# Patient Record
Sex: FEMALE | Race: WHITE | HISPANIC OR LATINO | Employment: PART TIME | URBAN - METROPOLITAN AREA
[De-identification: names, ages, dates, MRNs, and addresses within clinical notes are randomized per-mention and may not be internally consistent; named-entity substitution may affect disease eponyms.]

---
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
## 2022-01-27 PROBLEM — N91.1 SECONDARY AMENORRHEA: Status: ACTIVE | Noted: 2022-01-27

## 2022-03-18 ENCOUNTER — OFFICE VISIT (OUTPATIENT)
Dept: OBGYN CLINIC | Facility: CLINIC | Age: 22
End: 2022-03-18
Payer: COMMERCIAL

## 2022-03-18 VITALS
SYSTOLIC BLOOD PRESSURE: 122 MMHG | HEIGHT: 61 IN | BODY MASS INDEX: 25.11 KG/M2 | DIASTOLIC BLOOD PRESSURE: 82 MMHG | WEIGHT: 133 LBS

## 2022-03-18 DIAGNOSIS — B96.89 BV (BACTERIAL VAGINOSIS): Primary | ICD-10-CM

## 2022-03-18 DIAGNOSIS — Z11.3 SCREENING FOR STD (SEXUALLY TRANSMITTED DISEASE): ICD-10-CM

## 2022-03-18 DIAGNOSIS — R10.2 VAGINAL PAIN: ICD-10-CM

## 2022-03-18 DIAGNOSIS — N76.0 BV (BACTERIAL VAGINOSIS): Primary | ICD-10-CM

## 2022-03-18 LAB
BV WHIFF TEST VAG QL: ABNORMAL
CLUE CELLS SPEC QL WET PREP: ABNORMAL
T VAGINALIS VAG QL WET PREP: ABNORMAL
YEAST VAG QL WET PREP: ABNORMAL

## 2022-03-18 PROCEDURE — 87491 CHLMYD TRACH DNA AMP PROBE: CPT | Performed by: OBSTETRICS & GYNECOLOGY

## 2022-03-18 PROCEDURE — 1036F TOBACCO NON-USER: CPT | Performed by: OBSTETRICS & GYNECOLOGY

## 2022-03-18 PROCEDURE — 87210 SMEAR WET MOUNT SALINE/INK: CPT | Performed by: OBSTETRICS & GYNECOLOGY

## 2022-03-18 PROCEDURE — 3008F BODY MASS INDEX DOCD: CPT | Performed by: OBSTETRICS & GYNECOLOGY

## 2022-03-18 PROCEDURE — 99203 OFFICE O/P NEW LOW 30 MIN: CPT | Performed by: OBSTETRICS & GYNECOLOGY

## 2022-03-18 PROCEDURE — 87591 N.GONORRHOEAE DNA AMP PROB: CPT | Performed by: OBSTETRICS & GYNECOLOGY

## 2022-03-18 RX ORDER — DIPHENOXYLATE HYDROCHLORIDE AND ATROPINE SULFATE 2.5; .025 MG/1; MG/1
1 TABLET ORAL DAILY
COMMUNITY

## 2022-03-18 RX ORDER — METRONIDAZOLE 500 MG/1
500 TABLET ORAL EVERY 12 HOURS SCHEDULED
Qty: 14 TABLET | Refills: 0 | Status: SHIPPED | OUTPATIENT
Start: 2022-03-18 | End: 2022-03-25

## 2022-03-19 LAB
C TRACH DNA SPEC QL NAA+PROBE: NEGATIVE
N GONORRHOEA DNA SPEC QL NAA+PROBE: NEGATIVE

## 2022-03-21 ENCOUNTER — TELEPHONE (OUTPATIENT)
Dept: OBGYN CLINIC | Facility: CLINIC | Age: 22
End: 2022-03-21

## 2022-03-21 NOTE — PROGRESS NOTES
Gynecology   Angel Hunt 25 y o  female MRN: [id number]    Assessment/Plan     1  Vaginal pain    - US pelvis complete w transvaginal; Future    2  Screening for STD (sexually transmitted disease)    - Chlamydia/GC amplified DNA by PCR    3  BV (bacterial vaginosis)    - metroNIDAZOLE (FLAGYL) 500 mg tablet; Take 1 tablet (500 mg total) by mouth every 12 (twelve) hours for 7 days  Dispense: 14 tablet; Refill: 0      HPI:  Angel Hunt is a 25 y o  female who presents with yellow vaginal discharge and vaginal pain x 1 week  No spotting  No fever, n/v  No urinary symptoms  Has not tried OTC meds        Historical Information   Past Medical History:   Diagnosis Date    Pilonidal cyst     Wears glasses      Past Surgical History:   Procedure Laterality Date    INCISION AND DRAINAGE ABSCESS ANAL      Pilonidal Cyst  4 times-cut & drained in office-reoccurred    CT REMV PILONIDAL LESION COMPLIC N/A 2/17/4963    Procedure: EXCISION PILONIDAL CYST;  Surgeon: Caro Schuster MD;  [facility];  Service: General    CT REMV PILONIDAL LESION SIMPLE N/A 4/5/2018    Procedure: EXCISION PILONIDAL CYST and debriding;  Surgeon: Caro Schuster MD;  [facility];  Service: General     OB/GYN History:   Nulligravida  Sexually active  Contraception:  Condoms  Regular menses    Family History   Problem Relation Age of Onset    No Known Problems Mother     No Known Problems Father     Diabetes Maternal Grandmother     Heart disease Maternal Grandmother     Hypertension Maternal Grandmother     Cancer Paternal Grandmother     Cancer Other     Cancer Other      Social History   Social History     Substance and Sexual Activity   Alcohol Use No     Social History     Substance and Sexual Activity   Drug Use No     Social History     Tobacco Use   Smoking Status Never Smoker   Smokeless Tobacco Never Used     E-Cigarette/Vaping    E-Cigarette Use Never User      E-Cigarette/Vaping Substances    Nicotine No     THC No     CBD No     Flavoring No     Other No     Unknown No        Meds/Allergies   Current Outpatient Medications on File Prior to Visit   Medication Sig    multivitamin (THERAGRAN) TABS Take 1 tablet by mouth daily    ferrous sulfate 325 (65 Fe) mg tablet Take 325 mg by mouth daily with breakfast (Patient not taking: Reported on 3/18/2022 )     No current facility-administered medications on file prior to visit  Allergies   Allergen Reactions    Sulfa Antibiotics Hives       Objective   Vitals: Blood pressure 122/82, height 5' 0 75" (1 543 m), weight 60 3 kg (133 lb), last menstrual period 03/07/2022, not currently breastfeeding  Physical Exam  Constitutional:       Appearance: Normal appearance  Genitourinary:      Right Labia: No lesions or Bartholin's cyst      Left Labia: No lesions or Bartholin's cyst      No inguinal adenopathy present in the right or left side  Vaginal discharge present  No vaginal bleeding  Right Adnexa: not tender, not full and no mass present  Left Adnexa: not tender, not full and no mass present  Cervical motion tenderness present  No cervical discharge or friability  Uterus is not enlarged, fixed or tender  HENT:      Head: Normocephalic  Cardiovascular:      Rate and Rhythm: Normal rate and regular rhythm  Pulmonary:      Effort: Pulmonary effort is normal    Abdominal:      General: There is no distension  Palpations: Abdomen is soft  Tenderness: There is no abdominal tenderness  Musculoskeletal:         General: No swelling  Lymphadenopathy:      Lower Body: No right inguinal adenopathy  No left inguinal adenopathy  Neurological:      General: No focal deficit present  Mental Status: She is alert and oriented to person, place, and time  Skin:     General: Skin is warm and dry  Psychiatric:         Mood and Affect: Mood normal          Behavior: Behavior normal    Vitals reviewed  Microscopic wet-mount exam shows clue cells, excessive bacteria

## 2022-04-07 ENCOUNTER — OFFICE VISIT (OUTPATIENT)
Dept: FAMILY MEDICINE CLINIC | Facility: CLINIC | Age: 22
End: 2022-04-07

## 2022-04-07 VITALS
HEIGHT: 61 IN | WEIGHT: 138.8 LBS | DIASTOLIC BLOOD PRESSURE: 85 MMHG | RESPIRATION RATE: 16 BRPM | SYSTOLIC BLOOD PRESSURE: 124 MMHG | HEART RATE: 96 BPM | TEMPERATURE: 98 F | BODY MASS INDEX: 26.21 KG/M2

## 2022-04-07 DIAGNOSIS — R52 BODY ACHES: Primary | ICD-10-CM

## 2022-04-07 PROCEDURE — 99213 OFFICE O/P EST LOW 20 MIN: CPT | Performed by: FAMILY MEDICINE

## 2022-04-07 PROCEDURE — 1036F TOBACCO NON-USER: CPT | Performed by: FAMILY MEDICINE

## 2022-04-07 PROCEDURE — 3008F BODY MASS INDEX DOCD: CPT | Performed by: FAMILY MEDICINE

## 2022-04-07 PROCEDURE — 3008F BODY MASS INDEX DOCD: CPT | Performed by: OBSTETRICS & GYNECOLOGY

## 2022-04-07 NOTE — ASSESSMENT & PLAN NOTE
Chronic, 1 year duration  - Muscle aches along with joint pain in knees, back, neck, fingers, and shoulders  - No first degree relatives with autoimmune conditions, possible cousin with SLE  - No swelling, muscle pain or other systemic manifestations at this time  - Autoimmune conditions on the differential;however, patient does have a sedentary lifestyle at work and this could contribute to decreased muscle mass  - Will trial weight-lifting exercise along with current cardio routine for 1 month; if no improvement, will proceed with autoimmune workup

## 2022-05-10 ENCOUNTER — OFFICE VISIT (OUTPATIENT)
Dept: FAMILY MEDICINE CLINIC | Facility: CLINIC | Age: 22
End: 2022-05-10

## 2022-05-10 VITALS
SYSTOLIC BLOOD PRESSURE: 130 MMHG | RESPIRATION RATE: 18 BRPM | WEIGHT: 134 LBS | HEIGHT: 61 IN | DIASTOLIC BLOOD PRESSURE: 100 MMHG | BODY MASS INDEX: 25.3 KG/M2 | HEART RATE: 126 BPM | TEMPERATURE: 97.6 F | OXYGEN SATURATION: 98 %

## 2022-05-10 DIAGNOSIS — R52 BODY ACHES: Primary | ICD-10-CM

## 2022-05-10 DIAGNOSIS — R53.83 FATIGUE, UNSPECIFIED TYPE: ICD-10-CM

## 2022-05-10 PROCEDURE — 1036F TOBACCO NON-USER: CPT | Performed by: FAMILY MEDICINE

## 2022-05-10 PROCEDURE — 99213 OFFICE O/P EST LOW 20 MIN: CPT | Performed by: FAMILY MEDICINE

## 2022-05-10 PROCEDURE — 3008F BODY MASS INDEX DOCD: CPT | Performed by: FAMILY MEDICINE

## 2022-05-10 NOTE — ASSESSMENT & PLAN NOTE
Continues to have body aches and joint pain; mostly in the shoulders and hips with some low back involvement  - Patient also endorses increased fatigue and difficulty sleeping  - Will proceed with autoimmune workup along with obtaining TSH and CMP  - Will follow-up with discussion over labs

## 2022-05-10 NOTE — PROGRESS NOTES
Assessment/Plan: Body aches  Continues to have body aches and joint pain; mostly in the shoulders and hips with some low back involvement  - Patient also endorses increased fatigue and difficulty sleeping  - Will proceed with autoimmune workup along with obtaining TSH and CMP  - Will follow-up with discussion over labs       Diagnoses and all orders for this visit:    Body aches  -     Sedimentation rate, automated; Future  -     VIDAL w/Reflex if Positive; Future  -     CBC and differential; Future  -     TSH, 3rd generation; Future  -     Comprehensive metabolic panel; Future    Fatigue, unspecified type  -     CBC and differential; Future  -     TSH, 3rd generation; Future  -     Comprehensive metabolic panel; Future          Subjective:      Patient ID: Maira Graham is a 23 y o  female  28yo female her for a follow-up of her body aches  She is having shoulder and hip pain and having difficulty sleeping at night because of the pain  She is taking Tylenol without much relief  She has cut her cardio to 20 minutes and has been doing 10-15 minutes of free weights at the gym  She is also having neck pain and back pain  The following portions of the patient's history were reviewed and updated as appropriate: allergies, current medications, past family history, past medical history, past social history, past surgical history and problem list     Review of Systems   Constitutional: Positive for fatigue  HENT: Negative  Respiratory: Negative  Cardiovascular: Negative  Gastrointestinal: Positive for diarrhea (when eating dairy products)  Genitourinary: Negative  Musculoskeletal: Positive for back pain  Negative for arthralgias  Neurological: Positive for weakness  Negative for dizziness, syncope, light-headedness, numbness and headaches           Objective:      /100 (BP Location: Left arm, Patient Position: Sitting, Cuff Size: Standard)   Pulse (!) 126   Temp 97 6 °F (36 4 °C) (Temporal)   Resp 18   Ht 5' 1" (1 549 m)   Wt 60 8 kg (134 lb)   SpO2 98%   BMI 25 32 kg/m²          Physical Exam  Vitals reviewed  Constitutional:       Appearance: Normal appearance  She is normal weight  HENT:      Head: Normocephalic and atraumatic  Cardiovascular:      Rate and Rhythm: Normal rate and regular rhythm  Heart sounds: Normal heart sounds  Pulmonary:      Effort: Pulmonary effort is normal    Musculoskeletal:         General: No swelling or tenderness  Normal range of motion  Skin:     General: Skin is warm and dry  Coloration: Skin is not pale  Findings: No rash  Neurological:      Mental Status: She is alert  Mental status is at baseline     Psychiatric:         Mood and Affect: Mood normal          Behavior: Behavior normal

## 2022-05-20 ENCOUNTER — LAB (OUTPATIENT)
Dept: LAB | Facility: CLINIC | Age: 22
End: 2022-05-20
Payer: COMMERCIAL

## 2022-05-20 DIAGNOSIS — R52 BODY ACHES: ICD-10-CM

## 2022-05-20 DIAGNOSIS — R53.83 FATIGUE, UNSPECIFIED TYPE: ICD-10-CM

## 2022-05-20 LAB
ALBUMIN SERPL BCP-MCNC: 3.6 G/DL (ref 3.5–5)
ALP SERPL-CCNC: 61 U/L (ref 46–116)
ALT SERPL W P-5'-P-CCNC: 24 U/L (ref 12–78)
ANION GAP SERPL CALCULATED.3IONS-SCNC: 7 MMOL/L (ref 4–13)
AST SERPL W P-5'-P-CCNC: 12 U/L (ref 5–45)
BASOPHILS # BLD AUTO: 0.02 THOUSANDS/ΜL (ref 0–0.1)
BASOPHILS NFR BLD AUTO: 0 % (ref 0–1)
BILIRUB SERPL-MCNC: 0.43 MG/DL (ref 0.2–1)
BUN SERPL-MCNC: 13 MG/DL (ref 5–25)
CALCIUM SERPL-MCNC: 9.5 MG/DL (ref 8.3–10.1)
CHLORIDE SERPL-SCNC: 104 MMOL/L (ref 100–108)
CO2 SERPL-SCNC: 25 MMOL/L (ref 21–32)
CREAT SERPL-MCNC: 0.72 MG/DL (ref 0.6–1.3)
EOSINOPHIL # BLD AUTO: 0.05 THOUSAND/ΜL (ref 0–0.61)
EOSINOPHIL NFR BLD AUTO: 1 % (ref 0–6)
ERYTHROCYTE [DISTWIDTH] IN BLOOD BY AUTOMATED COUNT: 13.8 % (ref 11.6–15.1)
ERYTHROCYTE [SEDIMENTATION RATE] IN BLOOD: 49 MM/HOUR (ref 0–19)
GFR SERPL CREATININE-BSD FRML MDRD: 120 ML/MIN/1.73SQ M
GLUCOSE P FAST SERPL-MCNC: 87 MG/DL (ref 65–99)
HCT VFR BLD AUTO: 35.8 % (ref 34.8–46.1)
HGB BLD-MCNC: 11.2 G/DL (ref 11.5–15.4)
IMM GRANULOCYTES # BLD AUTO: 0.01 THOUSAND/UL (ref 0–0.2)
IMM GRANULOCYTES NFR BLD AUTO: 0 % (ref 0–2)
LYMPHOCYTES # BLD AUTO: 1.76 THOUSANDS/ΜL (ref 0.6–4.47)
LYMPHOCYTES NFR BLD AUTO: 33 % (ref 14–44)
MCH RBC QN AUTO: 27 PG (ref 26.8–34.3)
MCHC RBC AUTO-ENTMCNC: 31.3 G/DL (ref 31.4–37.4)
MCV RBC AUTO: 86 FL (ref 82–98)
MONOCYTES # BLD AUTO: 0.59 THOUSAND/ΜL (ref 0.17–1.22)
MONOCYTES NFR BLD AUTO: 11 % (ref 4–12)
NEUTROPHILS # BLD AUTO: 2.95 THOUSANDS/ΜL (ref 1.85–7.62)
NEUTS SEG NFR BLD AUTO: 55 % (ref 43–75)
NRBC BLD AUTO-RTO: 0 /100 WBCS
PLATELET # BLD AUTO: 226 THOUSANDS/UL (ref 149–390)
PMV BLD AUTO: 13.5 FL (ref 8.9–12.7)
POTASSIUM SERPL-SCNC: 4.1 MMOL/L (ref 3.5–5.3)
PROT SERPL-MCNC: 8.1 G/DL (ref 6.4–8.2)
RBC # BLD AUTO: 4.15 MILLION/UL (ref 3.81–5.12)
SODIUM SERPL-SCNC: 136 MMOL/L (ref 136–145)
TSH SERPL DL<=0.05 MIU/L-ACNC: <0.007 UIU/ML (ref 0.45–4.5)
WBC # BLD AUTO: 5.38 THOUSAND/UL (ref 4.31–10.16)

## 2022-05-20 PROCEDURE — 80053 COMPREHEN METABOLIC PANEL: CPT

## 2022-05-20 PROCEDURE — 86038 ANTINUCLEAR ANTIBODIES: CPT

## 2022-05-20 PROCEDURE — 85025 COMPLETE CBC W/AUTO DIFF WBC: CPT

## 2022-05-20 PROCEDURE — 36415 COLL VENOUS BLD VENIPUNCTURE: CPT

## 2022-05-20 PROCEDURE — 85652 RBC SED RATE AUTOMATED: CPT

## 2022-05-20 PROCEDURE — 84443 ASSAY THYROID STIM HORMONE: CPT

## 2022-05-21 LAB
ANA HOMOGEN TITR SER: ABNORMAL {TITER}
ANA TITR SER IF: POSITIVE {TITER}
SL AMB NOTE:: ABNORMAL

## 2022-05-24 ENCOUNTER — TELEPHONE (OUTPATIENT)
Dept: FAMILY MEDICINE CLINIC | Facility: CLINIC | Age: 22
End: 2022-05-24

## 2022-05-24 DIAGNOSIS — R76.8 POSITIVE ANA (ANTINUCLEAR ANTIBODY): Primary | ICD-10-CM

## 2022-05-24 DIAGNOSIS — R52 BODY ACHES: Primary | ICD-10-CM

## 2022-05-24 DIAGNOSIS — R53.83 FATIGUE, UNSPECIFIED TYPE: ICD-10-CM

## 2022-05-24 NOTE — TELEPHONE ENCOUNTER
Called the patient discussed her recent blood work test results with her  We discussed what upon positive VIDAL and titer means and that she needs further testing to determine which condition she may have  I placed order for a UA, Anti-SSA, and repeat TSH with reflex  The patient says she will go get her blood test done as soon as she is able to  I will call her with the results and we will determine if further evaluation is needed by Rheumatology      Ya Lomax MD  05/24/22  10:58 AM

## 2022-05-27 ENCOUNTER — APPOINTMENT (OUTPATIENT)
Dept: LAB | Facility: CLINIC | Age: 22
End: 2022-05-27
Payer: COMMERCIAL

## 2022-05-27 DIAGNOSIS — R79.89 ELEVATED SERUM FREE T4 LEVEL: ICD-10-CM

## 2022-05-27 DIAGNOSIS — R53.83 FATIGUE, UNSPECIFIED TYPE: ICD-10-CM

## 2022-05-27 DIAGNOSIS — R76.8 POSITIVE ANA (ANTINUCLEAR ANTIBODY): ICD-10-CM

## 2022-05-27 DIAGNOSIS — R52 BODY ACHES: ICD-10-CM

## 2022-05-27 LAB
BACTERIA UR QL AUTO: ABNORMAL /HPF
BILIRUB UR QL STRIP: NEGATIVE
CLARITY UR: CLEAR
COLOR UR: ABNORMAL
GLUCOSE UR STRIP-MCNC: NEGATIVE MG/DL
HGB UR QL STRIP.AUTO: ABNORMAL
KETONES UR STRIP-MCNC: NEGATIVE MG/DL
LEUKOCYTE ESTERASE UR QL STRIP: NEGATIVE
NITRITE UR QL STRIP: NEGATIVE
NON-SQ EPI CELLS URNS QL MICRO: ABNORMAL /HPF
PH UR STRIP.AUTO: 7.5 [PH]
PROT UR STRIP-MCNC: NEGATIVE MG/DL
RBC #/AREA URNS AUTO: ABNORMAL /HPF
SP GR UR STRIP.AUTO: 1.01 (ref 1–1.03)
T4 FREE SERPL-MCNC: 2.25 NG/DL (ref 0.78–1.33)
TSH SERPL DL<=0.05 MIU/L-ACNC: <0.007 UIU/ML (ref 0.45–4.5)
UROBILINOGEN UR STRIP-ACNC: <2 MG/DL
WBC #/AREA URNS AUTO: ABNORMAL /HPF

## 2022-05-27 PROCEDURE — 86235 NUCLEAR ANTIGEN ANTIBODY: CPT

## 2022-05-27 PROCEDURE — 81001 URINALYSIS AUTO W/SCOPE: CPT | Performed by: FAMILY MEDICINE

## 2022-05-27 PROCEDURE — 84443 ASSAY THYROID STIM HORMONE: CPT

## 2022-05-27 PROCEDURE — 86376 MICROSOMAL ANTIBODY EACH: CPT

## 2022-05-27 PROCEDURE — 36415 COLL VENOUS BLD VENIPUNCTURE: CPT

## 2022-05-27 PROCEDURE — 84439 ASSAY OF FREE THYROXINE: CPT

## 2022-05-28 LAB
ENA SS-A AB SER-ACNC: <0.2 AI (ref 0–0.9)
ENA SS-B AB SER-ACNC: <0.2 AI (ref 0–0.9)

## 2022-05-30 DIAGNOSIS — R79.89 ELEVATED SERUM FREE T4 LEVEL: Primary | ICD-10-CM

## 2022-05-30 DIAGNOSIS — R76.8 POSITIVE ANA (ANTINUCLEAR ANTIBODY): ICD-10-CM

## 2022-05-30 DIAGNOSIS — R52 BODY ACHES: ICD-10-CM

## 2022-05-31 LAB — THYROPEROXIDASE AB SERPL-ACNC: 173 IU/ML (ref 0–34)

## 2022-05-31 NOTE — TELEPHONE ENCOUNTER
Patient got lab work on 5/27 and got results on 5/30 wants to have Dr Eddy Vegas call and discuss results  Patient can be reached at [phone]

## 2022-06-01 LAB — THYROGLOB AB SERPL-ACNC: 1.2 IU/ML (ref 0–0.9)

## 2022-06-07 DIAGNOSIS — R94.6 ABNORMAL THYROID FUNCTION TEST: Primary | ICD-10-CM

## 2022-06-07 NOTE — PROGRESS NOTES
Called the patient regarding her thyroid results  We discussed the potential etiologies of her thyroid dysfunction including Grave's disease vs nodular adenoma  I discussed with her that I am ordering a RAIU scan that I would like for her schedule to differentiate between the two  She understands the need for this additional test and all of her questions were answered  Plan to followup results of testing to determine treatment      Shannan Figueroa MD   06/07/22  8:28 AM

## 2022-06-29 ENCOUNTER — HOSPITAL ENCOUNTER (OUTPATIENT)
Dept: NUCLEAR MEDICINE | Facility: HOSPITAL | Age: 22
Discharge: HOME | End: 2022-06-29
Payer: COMMERCIAL

## 2022-06-29 DIAGNOSIS — R94.6 ABNORMAL THYROID FUNCTION TEST: ICD-10-CM

## 2022-06-29 PROCEDURE — 78014 THYROID IMAGING W/BLOOD FLOW: CPT

## 2022-06-29 PROCEDURE — G1004 CDSM NDSC: HCPCS

## 2022-06-29 PROCEDURE — A9516 IODINE I-123 SOD IODIDE MIC: HCPCS

## 2022-06-30 ENCOUNTER — HOSPITAL ENCOUNTER (OUTPATIENT)
Dept: NUCLEAR MEDICINE | Facility: HOSPITAL | Age: 22
Discharge: HOME | End: 2022-06-30
Payer: COMMERCIAL

## 2022-07-07 ENCOUNTER — TELEPHONE (OUTPATIENT)
Dept: INTERNAL MEDICINE CLINIC | Facility: CLINIC | Age: 22
End: 2022-07-07

## 2022-07-07 ENCOUNTER — OFFICE VISIT (OUTPATIENT)
Dept: FAMILY MEDICINE CLINIC | Facility: CLINIC | Age: 22
End: 2022-07-07

## 2022-07-07 VITALS
BODY MASS INDEX: 23.92 KG/M2 | TEMPERATURE: 98.3 F | DIASTOLIC BLOOD PRESSURE: 73 MMHG | SYSTOLIC BLOOD PRESSURE: 120 MMHG | OXYGEN SATURATION: 92 % | WEIGHT: 126.6 LBS | HEART RATE: 117 BPM

## 2022-07-07 DIAGNOSIS — E05.90 HYPERTHYROIDISM: Primary | ICD-10-CM

## 2022-07-07 PROCEDURE — 93000 ELECTROCARDIOGRAM COMPLETE: CPT | Performed by: FAMILY MEDICINE

## 2022-07-07 PROCEDURE — 1036F TOBACCO NON-USER: CPT | Performed by: FAMILY MEDICINE

## 2022-07-07 PROCEDURE — 3725F SCREEN DEPRESSION PERFORMED: CPT | Performed by: FAMILY MEDICINE

## 2022-07-07 PROCEDURE — 99213 OFFICE O/P EST LOW 20 MIN: CPT | Performed by: FAMILY MEDICINE

## 2022-07-07 RX ORDER — METHIMAZOLE 5 MG/1
5 TABLET ORAL 3 TIMES DAILY
Qty: 270 TABLET | Refills: 1 | Status: SHIPPED | OUTPATIENT
Start: 2022-07-07 | End: 2022-07-11 | Stop reason: SDUPTHER

## 2022-07-07 NOTE — PROGRESS NOTES
Assessment/Plan:    Hyperthyroidism  - Suppressed TSh with elevated t4 and positive thyroid antibody  Thyroid uptake showed diffuse pattern which is most likely consistent with Graves Disease  -EKG in office normal sinus rhythm   -Will start patient on methimazole, refer to endocrinology  -f/u in office in 6 weeks          Diagnoses and all orders for this visit:    Hyperthyroidism  -     methimazole (TAPAZOLE) 5 mg tablet; Take 1 tablet (5 mg total) by mouth 3 (three) times a day  -     TSH, 3rd generation; Future  -     T3, free; Future  -     T4, free; Future  -     CBC and Platelet; Future  -     Ambulatory Referral to Endocrinology; Future        Subjective:      Patient ID: Hector Becerra is a 23 y o  female  The following portions of the patient's history were reviewed and updated as appropriate: allergies, current medications, past family history, past medical history and past surgical history  HPI     Ms Amada Swanson presents today in clinic to review the results of her RAIU Scan  She states that her fatigue, arthralgias, and myalgias have remained unchanged since her last visit  She has noticed that her hair in skin are more dry and she endorses increased hair loss  Patient also endorses increased agitation and anxiety, SOB with exertion, chest pain, and palpitations  She staets that her maternal grandmother and sister have been diagnosed with T1DM  Patient also states that her mother has a diagnoses of arthritis but is unsure if it is RA  Review of Systems   Constitutional: Positive for fatigue  Negative for chills and fever  HENT: Negative for rhinorrhea and sore throat  Eyes: Negative for pain and visual disturbance  Respiratory: Positive for shortness of breath  Negative for cough and wheezing  Cardiovascular: Positive for chest pain and palpitations  Gastrointestinal: Negative for abdominal pain, constipation and diarrhea     Endocrine: Negative for cold intolerance, heat intolerance and polydipsia  Genitourinary: Negative for difficulty urinating, dyspareunia, frequency and menstrual problem  Musculoskeletal: Positive for arthralgias, myalgias and neck pain  Skin: Negative for color change and rash  Neurological: Negative for dizziness, light-headedness and headaches  Hematological: Does not bruise/bleed easily  Psychiatric/Behavioral: Positive for agitation  The patient is nervous/anxious  Objective:      /73 (BP Location: Left arm, Patient Position: Sitting, Cuff Size: Standard)   Pulse (!) 117   Temp 98 3 °F (36 8 °C) (Temporal)   Wt 57 4 kg (126 lb 9 6 oz)   SpO2 92%   BMI 23 92 kg/m²          Physical Exam  Constitutional:       General: She is not in acute distress  Appearance: Normal appearance  She is normal weight  HENT:      Head: Normocephalic and atraumatic  Eyes:      Pupils: Pupils are equal, round, and reactive to light  Comments: Negative Lid Lag    Neck:      Comments: No goiter or nodule palpated   Cardiovascular:      Rate and Rhythm: Regular rhythm  Tachycardia present  Heart sounds: No murmur heard  Pulmonary:      Effort: Pulmonary effort is normal  No respiratory distress  Breath sounds: No stridor  No wheezing  Abdominal:      General: Abdomen is flat  Bowel sounds are normal       Palpations: Abdomen is soft  Musculoskeletal:         General: Normal range of motion  Cervical back: Normal range of motion  Skin:     General: Skin is dry  Findings: No bruising or rash  Neurological:      General: No focal deficit present  Mental Status: She is alert  Mental status is at baseline     Psychiatric:         Mood and Affect: Mood normal          Remy Triplett MD PGY-1

## 2022-07-07 NOTE — TELEPHONE ENCOUNTER
POCT EKG preformed at today's visit 7/7/2022  EKG printed and placed in [facility] Cler[employer] folder   Please scan

## 2022-07-07 NOTE — ASSESSMENT & PLAN NOTE
- Suppressed TSh with elevated t4 and positive thyroid antibody  Thyroid uptake showed diffuse pattern which is most likely consistent with Graves Disease  -EKG in office normal sinus rhythm   -Will start patient on methimazole, refer to endocrinology    -f/u in office in 6 weeks

## 2022-07-11 ENCOUNTER — CONSULT (OUTPATIENT)
Dept: ENDOCRINOLOGY | Facility: CLINIC | Age: 22
End: 2022-07-11
Payer: COMMERCIAL

## 2022-07-11 VITALS
BODY MASS INDEX: 24.02 KG/M2 | SYSTOLIC BLOOD PRESSURE: 124 MMHG | HEIGHT: 61 IN | DIASTOLIC BLOOD PRESSURE: 80 MMHG | WEIGHT: 127.2 LBS | HEART RATE: 100 BPM

## 2022-07-11 DIAGNOSIS — E05.90 HYPERTHYROIDISM: Primary | ICD-10-CM

## 2022-07-11 PROCEDURE — 99204 OFFICE O/P NEW MOD 45 MIN: CPT | Performed by: INTERNAL MEDICINE

## 2022-07-11 PROCEDURE — 3008F BODY MASS INDEX DOCD: CPT | Performed by: FAMILY MEDICINE

## 2022-07-11 RX ORDER — METHIMAZOLE 5 MG/1
TABLET ORAL
Qty: 360 TABLET | Refills: 1 | Status: SHIPPED | OUTPATIENT
Start: 2022-07-11 | End: 2022-08-13 | Stop reason: SDUPTHER

## 2022-07-11 NOTE — PROGRESS NOTES
7/11/2022    Assessment/Plan      Diagnoses and all orders for this visit:    Hyperthyroidism  -     Comprehensive metabolic panel; Future  -     CBC and differential; Future  -     TSH, 3rd generation; Future  -     T3, free; Future  -     T4, free; Future  -     Thyroid stimulating immunoglobulin; Future  -     methimazole (TAPAZOLE) 5 mg tablet; 4 tabs daily  DO NOT FILL YET, DOSE CHANGE  Assessment/Plan:  Hyperthyroidism:  Due to Graves disease based on positive autoimmune markers as well as uptake and scan results  I would suggest adjusting methimazole dose to 20 mg daily or 4 tablets daily  Discussed we could add a beta-blocker to her regimen for symptom control but patient would prefer to wait on repeat labs over the next few weeks and see how she feels at that time  Suggest repeating labs in about 3-4 weeks and we will call with the results  We reviewed mechanism of action of methimazole as well as side effects  Advised I would hold off on exercise especially aerobic exercise at this time until thyroid levels are normalized  Discussed with the patient that I would avoid pregnancy until thyroid levels are normal   I did mention that she should let us know in the future if pregnancy occurs so we can adjust her regimen appropriately  Also, discussed that once thyroid levels are normal if she tries for pregnancy, we may elect to switch to a different medication and during pregnancy we will monitor thyroid lab work regularly  Discussed with the patient about medical management of hyperthyroidism and use of antithyroid medications including the side effects of methimazole including joint aches, rash, agranulocytosis, and liver dysfunction  The patient should call if they develop side effects such as signs or symptoms of liver dysfunction (such as jaundice) or agranulocytosis (such as fever and sore throat)    Discussed the importance of compliance with medications to prevent life threatening complications of uncontrolled hyperthyroidism  CC: Hyperthyroidism consult    History of Present Illness     HPI: Andrew Wheatley is a 23y o  year old female who presents for consultation of hyperthyroidism  This was discovered during workup for symptoms of concern such as fatigue, chest pain, palpitations, shortness of breath, heat intolerance, difficulty sleeping  For her chest pain she did have evaluation by Cardiology and the patient states it is not find any cardiac cause  She was seen by her primary care doctor last week and started on methimazole 5 mg 3 times daily  She denies known thyroid history in the family but does report perhaps there is autoimmune disorders somewhere in her family such as with her aunt  She denies any neck compressive symptoms  She denies any eye discomfort, vision changes but does report her prescription for glasses to change recently  She denies smoking  She does exercise several times throughout the week including aerobic exercise  She does report interest in fertility in the future  Review of Systems   Constitutional: Positive for fatigue  HENT: Negative for trouble swallowing and voice change  Eyes: Negative for visual disturbance  Respiratory: Positive for shortness of breath  Cardiovascular: Positive for chest pain and palpitations  Negative for leg swelling  Gastrointestinal: Positive for constipation  Negative for abdominal pain, nausea and vomiting  Endocrine: Positive for heat intolerance  Negative for polydipsia and polyuria  Musculoskeletal: Positive for arthralgias and myalgias  Skin: Negative for rash  Neurological: Positive for tremors (shakiness)  Negative for dizziness and weakness  Hematological: Negative for adenopathy  Psychiatric/Behavioral: Negative for agitation and confusion  The patient is nervous/anxious          Historical Information   Past Medical History:   Diagnosis Date    Pilonidal cyst     Wears glasses Past Surgical History:   Procedure Laterality Date    INCISION AND DRAINAGE ABSCESS ANAL      Pilonidal Cyst  4 times-cut & drained in office-reoccurred    NE REMV PILONIDAL LESION COMPLIC N/A 9/50/9629    Procedure: EXCISION PILONIDAL CYST;  Surgeon: Marquise Calabrese MD;  Location: [facility];  Service: General    NE REMV PILONIDAL LESION SIMPLE N/A 4/5/2018    Procedure: EXCISION PILONIDAL CYST and debriding;  Surgeon: Marquise Calabrese MD;  Location[facility];  Service: General     Social History   Social History     Substance and Sexual Activity   Alcohol Use No     Social History     Substance and Sexual Activity   Drug Use No     Social History     Tobacco Use   Smoking Status Never Smoker   Smokeless Tobacco Never Used     Family History:   Family History   Problem Relation Age of Onset    No Known Problems Mother     No Known Problems Father     No Known Problems Brother     Diabetes Maternal Grandmother     Heart disease Maternal Grandmother     Hypertension Maternal Grandmother     Cancer Paternal Grandmother     Cancer Other     Cancer Other        Meds/Allergies   Current Outpatient Medications   Medication Sig Dispense Refill    methimazole (TAPAZOLE) 5 mg tablet 4 tabs daily  DO NOT FILL YET, DOSE CHANGE  360 tablet 1    multivitamin (THERAGRAN) TABS Take 1 tablet by mouth daily      ferrous sulfate 325 (65 Fe) mg tablet Take 325 mg by mouth daily with breakfast (Patient not taking: No sig reported)       No current facility-administered medications for this visit  Allergies   Allergen Reactions    Sulfa Antibiotics Hives       Objective   Vitals: Blood pressure 124/80, pulse 100, height 5' 1" (1 549 m), weight 57 7 kg (127 lb 3 2 oz), not currently breastfeeding  Invasive Devices  Report    None                 Physical Exam  Vitals reviewed  Constitutional:       General: She is not in acute distress  Appearance: She is well-developed  She is not diaphoretic     HENT: Head: Normocephalic and atraumatic  Eyes:      Conjunctiva/sclera: Conjunctivae normal       Pupils: Pupils are equal, round, and reactive to light  Neck:      Thyroid: No thyromegaly  Cardiovascular:      Rate and Rhythm: Normal rate and regular rhythm  Pulmonary:      Effort: Pulmonary effort is normal  No respiratory distress  Breath sounds: Normal breath sounds  Abdominal:      General: Bowel sounds are normal       Palpations: Abdomen is soft  Musculoskeletal:         General: Normal range of motion  Cervical back: Normal range of motion and neck supple  Skin:     General: Skin is warm and dry  Findings: No rash  Neurological:      Mental Status: She is alert and oriented to person, place, and time  Motor: No abnormal muscle tone  Psychiatric:         Behavior: Behavior normal          The history was obtained from the review of the chart and from the patient  Lab Results:      Component      Latest Ref Rng & Units 5/27/2022   SSA (RO) ANTIBODY      0 0 - 0 9 AI <0 2   SSB (LA) ANTIBODY      0 0 - 0 9 AI <0 2   TSH 3RD GENERATON      0 450 - 4 500 uIU/mL <0 007 (L)   Free T4      0 78 - 1 33 ng/dL 2 25 (H)   THYROID MICROSOMAL ANTIBODY      0 - 34 IU/mL 173 (H)   THYROGLOBULIN AB      0 0 - 0 9 IU/mL 1 2 (H)     6/30/22:  THYROID SCAN AND UPTAKE     INDICATION:  R94 6: Abnormal results of thyroid function studies      COMPARISON:  None     TECHNIQUE:   The study was performed following the oral administration of 290 uCi I-123      FINDINGS:      The thyroid scan demonstrates diffusely increased radiotracer uptake in the thyroid gland  No findings for a hot or cold nodule      Thyroid uptake values are:     6 hours:   34 2% (N =  5 -15%)     24 hours: 53 0% (N =15 - 35%)     IMPRESSION:     1  Elevated thyroid uptake at both 6 and 24 hours compatible with a hyperthyroid gland    Correlate for possible Graves' disease         Workstation performed: [id number]    Future Appointments   Date Time Provider Katya Rebolledo   8/17/2022  1:00 PM MD Derik Calderon [facility] Derik [facility]       Portions of the record may have been created with voice recognition software  Occasional wrong word or "sound a like" substitutions may have occurred due to the inherent limitations of voice recognition software  Read the chart carefully and recognize, using context, where substitutions have occurred

## 2022-07-18 ENCOUNTER — APPOINTMENT (OUTPATIENT)
Dept: LAB | Facility: CLINIC | Age: 22
End: 2022-07-18
Payer: COMMERCIAL

## 2022-07-18 DIAGNOSIS — E05.90 HYPERTHYROIDISM: ICD-10-CM

## 2022-07-18 LAB
ERYTHROCYTE [DISTWIDTH] IN BLOOD BY AUTOMATED COUNT: 13.8 % (ref 11.6–15.1)
HCT VFR BLD AUTO: 40.5 % (ref 34.8–46.1)
HGB BLD-MCNC: 12.4 G/DL (ref 11.5–15.4)
MCH RBC QN AUTO: 25.5 PG (ref 26.8–34.3)
MCHC RBC AUTO-ENTMCNC: 30.6 G/DL (ref 31.4–37.4)
MCV RBC AUTO: 83 FL (ref 82–98)
PLATELET # BLD AUTO: 241 THOUSANDS/UL (ref 149–390)
PMV BLD AUTO: 13 FL (ref 8.9–12.7)
RBC # BLD AUTO: 4.86 MILLION/UL (ref 3.81–5.12)
T3FREE SERPL-MCNC: 5.78 PG/ML (ref 2.91–4.53)
T4 FREE SERPL-MCNC: 1.84 NG/DL (ref 0.78–1.33)
TSH SERPL DL<=0.05 MIU/L-ACNC: <0.007 UIU/ML (ref 0.45–4.5)
WBC # BLD AUTO: 3.76 THOUSAND/UL (ref 4.31–10.16)

## 2022-07-18 PROCEDURE — 85027 COMPLETE CBC AUTOMATED: CPT

## 2022-07-18 PROCEDURE — 36415 COLL VENOUS BLD VENIPUNCTURE: CPT

## 2022-07-18 PROCEDURE — 84481 FREE ASSAY (FT-3): CPT

## 2022-07-18 PROCEDURE — 84439 ASSAY OF FREE THYROXINE: CPT

## 2022-07-18 PROCEDURE — 84443 ASSAY THYROID STIM HORMONE: CPT

## 2022-08-01 ENCOUNTER — OFFICE VISIT (OUTPATIENT)
Dept: URGENT CARE | Facility: CLINIC | Age: 22
End: 2022-08-01
Payer: COMMERCIAL

## 2022-08-01 VITALS
RESPIRATION RATE: 18 BRPM | DIASTOLIC BLOOD PRESSURE: 72 MMHG | SYSTOLIC BLOOD PRESSURE: 112 MMHG | HEART RATE: 104 BPM | TEMPERATURE: 99.3 F | OXYGEN SATURATION: 95 %

## 2022-08-01 DIAGNOSIS — R21 RASH: Primary | ICD-10-CM

## 2022-08-01 PROCEDURE — 99213 OFFICE O/P EST LOW 20 MIN: CPT | Performed by: PHYSICIAN ASSISTANT

## 2022-08-01 RX ORDER — HYDROCORTISONE 25 MG/ML
LOTION TOPICAL 2 TIMES DAILY
Qty: 59 ML | Refills: 0 | Status: SHIPPED | OUTPATIENT
Start: 2022-08-01 | End: 2022-08-17

## 2022-08-01 NOTE — PROGRESS NOTES
[facility]        NAME: Sunny Carmona is a 23 y o  female  : 2000    MRN: [id number]  DATE: 2022  TIME: 10:27 AM    Assessment and Plan   Rash [R21]  1  Rash  hydrocortisone 2 5 % lotion         Patient Instructions     Discussed rash appears to be allergic dermatitis   May try hydrocortisone, avoid itching/scratching   Monitor for worsening symptoms such as fever, body aches, pustules/vesicles, etc    Follow up with PCP in 3-5 days  Proceed to  ER if symptoms worsen  Chief Complaint     Chief Complaint   Patient presents with    Rash     Patient c/o rash on her face and chest this morning  History of Present Illness       Patient presents with complaint of rash which she 1st noticed this morning  She states that there are spots on her abdomen, under her arms and breast and on her chin  She describes them as pink spots, some being slightly raised  She describes the rash as itchy but denies pain  She denies pustules, blistering, fever, chills, sweats, body aches, cough, fatigue, and other cold symptoms  She denies known recent sick contacts, travel, new skin products, detergents, or foods  She denies recent hiking, camping, or contact with non food plants  She reports trying a topical over-the-counter anti-itch cream with minimal improvement  Review of Systems   Review of Systems   Constitutional: Negative for chills and fever  HENT: Negative for ear pain and sore throat  Eyes: Negative for pain and visual disturbance  Respiratory: Negative for cough and shortness of breath  Cardiovascular: Negative for chest pain and palpitations  Gastrointestinal: Negative for abdominal pain and vomiting  Genitourinary: Negative for dysuria and hematuria  Musculoskeletal: Negative for arthralgias and back pain  Skin: Positive for rash  Negative for color change  Neurological: Negative for seizures and syncope     All other systems reviewed and are negative  Current Medications       Current Outpatient Medications:     hydrocortisone 2 5 % lotion, Apply topically 2 (two) times a day for 7 days, Disp: 59 mL, Rfl: 0    ferrous sulfate 325 (65 Fe) mg tablet, Take 325 mg by mouth daily with breakfast (Patient not taking: No sig reported), Disp: , Rfl:     methimazole (TAPAZOLE) 5 mg tablet, 4 tabs daily  DO NOT FILL YET, DOSE CHANGE , Disp: 360 tablet, Rfl: 1    multivitamin (THERAGRAN) TABS, Take 1 tablet by mouth daily, Disp: , Rfl:     Current Allergies     Allergies as of 08/01/2022 - Reviewed 08/01/2022   Allergen Reaction Noted    Sulfa antibiotics Hives 02/23/2018            The following portions of the patient's history were reviewed and updated as appropriate: allergies, current medications, past family history, past medical history, past social history, past surgical history and problem list      Past Medical History:   Diagnosis Date    Pilonidal cyst     Wears glasses        Past Surgical History:   Procedure Laterality Date    INCISION AND DRAINAGE ABSCESS ANAL      Pilonidal Cyst  4 times-cut & drained in office-reoccurred     [address] N/A 1/65/2652    Procedure: EXCISION PILONIDAL CYST;  Surgeon: Charmayne Honey, MD;  Location: [facility];  Service: General    [place] [address] N/A 4/5/2018    Procedure: EXCISION PILONIDAL CYST and debriding;  Surgeon: Charmayne Honey, MD;  [facility];  Service: General       Family History   Problem Relation Age of Onset    No Known Problems Mother     No Known Problems Father     No Known Problems Brother     Diabetes Maternal Grandmother     Heart disease Maternal Grandmother     Hypertension Maternal Grandmother     Cancer Paternal Grandmother     Cancer Other     Cancer Other          Medications have been verified  Objective   /72   Pulse 104   Temp 99 3 °F (37 4 °C)   Resp 18   SpO2 95%   No LMP recorded         Physical Exam     Physical Exam  Vitals and nursing note reviewed  Constitutional:       General: She is not in acute distress  Appearance: Normal appearance  She is well-developed  She is not ill-appearing or diaphoretic  HENT:      Head: Normocephalic and atraumatic  Eyes:      Conjunctiva/sclera: Conjunctivae normal       Pupils: Pupils are equal, round, and reactive to light  Cardiovascular:      Rate and Rhythm: Normal rate and regular rhythm  Heart sounds: Normal heart sounds  Pulmonary:      Effort: Pulmonary effort is normal  No respiratory distress  Breath sounds: Normal breath sounds  No stridor  Musculoskeletal:      Cervical back: Normal range of motion and neck supple  Lymphadenopathy:      Cervical: No cervical adenopathy  Skin:     General: Skin is warm and dry  Capillary Refill: Capillary refill takes less than 2 seconds  Findings: Rash (few scattered pink lesions of anterior torso (LLQ, inferior to left breast, right axilla, and left chin); all lesions <0 5 cm, few are raised, all are blanchable; exoriations present; no vesicles, induration, fluctuance; NTTP) present  Neurological:      Mental Status: She is alert and oriented to person, place, and time  Cranial Nerves: No cranial nerve deficit  Sensory: No sensory deficit  Psychiatric:         Behavior: Behavior normal          Thought Content:  Thought content normal

## 2022-08-13 DIAGNOSIS — E05.90 HYPERTHYROIDISM: ICD-10-CM

## 2022-08-15 RX ORDER — METHIMAZOLE 5 MG/1
TABLET ORAL
Qty: 360 TABLET | Refills: 0 | Status: SHIPPED | OUTPATIENT
Start: 2022-08-15 | End: 2022-08-16 | Stop reason: SDUPTHER

## 2022-08-16 DIAGNOSIS — E05.90 HYPERTHYROIDISM: ICD-10-CM

## 2022-08-16 RX ORDER — METHIMAZOLE 5 MG/1
TABLET ORAL
Qty: 360 TABLET | Refills: 0 | Status: SHIPPED | OUTPATIENT
Start: 2022-08-16

## 2022-08-17 ENCOUNTER — OFFICE VISIT (OUTPATIENT)
Dept: FAMILY MEDICINE CLINIC | Facility: CLINIC | Age: 22
End: 2022-08-17

## 2022-08-17 VITALS
TEMPERATURE: 97.4 F | HEIGHT: 62 IN | OXYGEN SATURATION: 100 % | RESPIRATION RATE: 18 BRPM | DIASTOLIC BLOOD PRESSURE: 98 MMHG | SYSTOLIC BLOOD PRESSURE: 124 MMHG | WEIGHT: 128 LBS | BODY MASS INDEX: 23.55 KG/M2 | HEART RATE: 102 BPM

## 2022-08-17 DIAGNOSIS — E05.90 HYPERTHYROIDISM: Primary | ICD-10-CM

## 2022-08-17 PROCEDURE — 99213 OFFICE O/P EST LOW 20 MIN: CPT | Performed by: FAMILY MEDICINE

## 2022-08-17 RX ORDER — PROPRANOLOL HYDROCHLORIDE 40 MG/1
40 TABLET ORAL 2 TIMES DAILY
Qty: 60 TABLET | Refills: 1 | Status: SHIPPED | OUTPATIENT
Start: 2022-08-17 | End: 2022-09-23 | Stop reason: SDUPTHER

## 2022-08-17 NOTE — ASSESSMENT & PLAN NOTE
-likely 2/2 muscle strain  -began 2 days ago on 8/15  Woke up with constant stabbing back pain starting in neck and radiating to mid thoracic    -denies trauma, fevers, chills, hx of IV drug use, unexplained weight loss, urinary retention, fecal incontinence, saddle anesthesia, or weakness   -Recommend tylenol double strength every 8 hours or Ibuprofen 800 mg every 8 hours   Advised to not take ibuprofen on an empty stomach  -Will schedule with OMT

## 2022-08-17 NOTE — ASSESSMENT & PLAN NOTE
-Due to Grave's disease given: uppressed TSH w/ elevated T4 and positive thyroid antibody  Thyroid uptake showed diffuse pattern    -Following with endocrinology   -Methimazole was increased to 5mg 4 times a day by endocrinology  Patient tolerating well, denies side effects   -pt endorses chest pain and racing heart; will start propranolol 40 mg BID  -repeat CBC, CMP, TSH,t3, t4 ordered by endocrinology; will follow up on results     -Discussed birth control options with patient given Grave's disease and no desire for children now   Patient would like to consider options at this time   -Will follow up in 1 month

## 2022-08-17 NOTE — PROGRESS NOTES
Assessment/Plan:    Hyperthyroidism  -Due to Grave's disease given: uppressed TSH w/ elevated T4 and positive thyroid antibody  Thyroid uptake showed diffuse pattern    -Following with endocrinology   -Methimazole was increased to 5mg 4 times a day by endocrinology  Patient tolerating well, denies side effects   -pt endorses chest pain and racing heart; will start propranolol 40 mg BID  -repeat CBC, CMP, TSH,t3, t4 ordered by endocrinology; will follow up on results     -Discussed birth control options with patient given Grave's disease and no desire for children now  Patient would like to consider options at this time   -Will follow up in 1 month    Back pain  -likely 2/2 muscle strain  -began 2 days ago on 8/15  Woke up with constant stabbing back pain starting in neck and radiating to mid thoracic    -denies trauma, fevers, chills, hx of IV drug use, unexplained weight loss, urinary retention, fecal incontinence, saddle anesthesia, or weakness   -Recommend tylenol double strength every 8 hours or Ibuprofen 800 mg every 8 hours  Advised to not take ibuprofen on an empty stomach  -Will schedule with OMT         Diagnoses and all orders for this visit:    Hyperthyroidism  -     propranolol (INDERAL) 40 mg tablet; Take 1 tablet (40 mg total) by mouth 2 (two) times a day        Subjective:      Patient ID: Angel Hunt is a 23 y o  female  HPI     Patient states that she has noticed that she is less fatigued and less heat intolerance on her methimazole  She does state she is still experiencing chest pain occasionally and notices palpitations  Patient denies side effects from methimazole and states she is tolerating the medication well and taking it was prescribed  Patient states that she began to have back pain on Monday morning (8/15) when she woke up  She describes the pain as stabbing pain that begins in her cervical spine and radiates down to the thoracic spine   She states the pain is 8 out of 10 and constant  She has tried tylenol once for this and it did not provide any relief  She also tried hot water in the shower with no relief  Patient states that the pain does not get worse with activity and nothing she has tried has given her relief from the pain  She denies any trauma to her back or any past injuries and she has never had pain like this in the past  She denies weakness or tingling of her arms or legs  Patient denies fevers, chills, hx of IV drug use, unexplained weight loss, urinary retention, fecal incontinence, or saddle anesthesia  The following portions of the patient's history were reviewed and updated as appropriate: allergies, current medications, past family history, past medical history, past social history, past surgical history and problem list     Review of Systems   Constitutional: Negative for chills, fatigue and fever  Eyes: Negative for visual disturbance  Respiratory: Negative for cough and shortness of breath  Cardiovascular: Positive for chest pain and palpitations  Gastrointestinal: Negative for abdominal pain, constipation and diarrhea  Endocrine: Negative for heat intolerance  Genitourinary: Negative for difficulty urinating  Musculoskeletal: Positive for arthralgias, back pain, myalgias and neck pain  Skin: Negative for rash  Neurological: Negative for dizziness, light-headedness and headaches  Objective:      /98 (BP Location: Left arm, Patient Position: Sitting, Cuff Size: Standard)   Pulse 102   Temp (!) 97 4 °F (36 3 °C) (Temporal)   Resp 18   Ht 5' 2" (1 575 m)   Wt 58 1 kg (128 lb)   SpO2 100%   BMI 23 41 kg/m²          Physical Exam  Constitutional:       General: She is not in acute distress  HENT:      Head: Normocephalic and atraumatic  Cardiovascular:      Rate and Rhythm: Regular rhythm  Tachycardia present  Pulses: Normal pulses  Heart sounds: No murmur heard    Pulmonary:      Effort: Pulmonary effort is normal  No respiratory distress  Breath sounds: Normal breath sounds  Abdominal:      General: Abdomen is flat  Bowel sounds are normal       Palpations: Abdomen is soft  Tenderness: There is no abdominal tenderness  Musculoskeletal:         General: Tenderness (to palpation of spine from cervical to mid thoracic ) present  No swelling, deformity or signs of injury  Normal range of motion  Cervical back: Tenderness present  No rigidity  Skin:     General: Skin is warm  Neurological:      General: No focal deficit present  Mental Status: She is alert and oriented to person, place, and time

## 2022-08-22 ENCOUNTER — APPOINTMENT (OUTPATIENT)
Dept: LAB | Facility: CLINIC | Age: 22
End: 2022-08-22
Payer: COMMERCIAL

## 2022-08-22 DIAGNOSIS — E05.90 HYPERTHYROIDISM: ICD-10-CM

## 2022-08-22 DIAGNOSIS — E05.90 HYPERTHYROIDISM: Primary | ICD-10-CM

## 2022-08-22 LAB
ALBUMIN SERPL BCP-MCNC: 3.8 G/DL (ref 3.5–5)
ALP SERPL-CCNC: 76 U/L (ref 46–116)
ALT SERPL W P-5'-P-CCNC: 17 U/L (ref 12–78)
ANION GAP SERPL CALCULATED.3IONS-SCNC: 2 MMOL/L (ref 4–13)
AST SERPL W P-5'-P-CCNC: 12 U/L (ref 5–45)
BASOPHILS # BLD AUTO: 0.01 THOUSANDS/ΜL (ref 0–0.1)
BASOPHILS NFR BLD AUTO: 0 % (ref 0–1)
BILIRUB SERPL-MCNC: 0.18 MG/DL (ref 0.2–1)
BUN SERPL-MCNC: 10 MG/DL (ref 5–25)
CALCIUM SERPL-MCNC: 9.3 MG/DL (ref 8.3–10.1)
CHLORIDE SERPL-SCNC: 105 MMOL/L (ref 96–108)
CO2 SERPL-SCNC: 28 MMOL/L (ref 21–32)
CREAT SERPL-MCNC: 0.64 MG/DL (ref 0.6–1.3)
EOSINOPHIL # BLD AUTO: 0.12 THOUSAND/ΜL (ref 0–0.61)
EOSINOPHIL NFR BLD AUTO: 2 % (ref 0–6)
ERYTHROCYTE [DISTWIDTH] IN BLOOD BY AUTOMATED COUNT: 14.8 % (ref 11.6–15.1)
GFR SERPL CREATININE-BSD FRML MDRD: 128 ML/MIN/1.73SQ M
GLUCOSE P FAST SERPL-MCNC: 99 MG/DL (ref 65–99)
HCT VFR BLD AUTO: 37.2 % (ref 34.8–46.1)
HGB BLD-MCNC: 11.5 G/DL (ref 11.5–15.4)
IMM GRANULOCYTES # BLD AUTO: 0.02 THOUSAND/UL (ref 0–0.2)
IMM GRANULOCYTES NFR BLD AUTO: 0 % (ref 0–2)
LYMPHOCYTES # BLD AUTO: 1.68 THOUSANDS/ΜL (ref 0.6–4.47)
LYMPHOCYTES NFR BLD AUTO: 32 % (ref 14–44)
MCH RBC QN AUTO: 25.9 PG (ref 26.8–34.3)
MCHC RBC AUTO-ENTMCNC: 30.9 G/DL (ref 31.4–37.4)
MCV RBC AUTO: 84 FL (ref 82–98)
MONOCYTES # BLD AUTO: 0.43 THOUSAND/ΜL (ref 0.17–1.22)
MONOCYTES NFR BLD AUTO: 8 % (ref 4–12)
NEUTROPHILS # BLD AUTO: 2.92 THOUSANDS/ΜL (ref 1.85–7.62)
NEUTS SEG NFR BLD AUTO: 58 % (ref 43–75)
NRBC BLD AUTO-RTO: 0 /100 WBCS
PLATELET # BLD AUTO: 337 THOUSANDS/UL (ref 149–390)
PMV BLD AUTO: 12.4 FL (ref 8.9–12.7)
POTASSIUM SERPL-SCNC: 4.2 MMOL/L (ref 3.5–5.3)
PROT SERPL-MCNC: 8.1 G/DL (ref 6.4–8.4)
RBC # BLD AUTO: 4.44 MILLION/UL (ref 3.81–5.12)
SODIUM SERPL-SCNC: 135 MMOL/L (ref 135–147)
T3FREE SERPL-MCNC: 3.64 PG/ML (ref 2.91–4.53)
T4 FREE SERPL-MCNC: 1.33 NG/DL (ref 0.78–1.33)
TSH SERPL DL<=0.05 MIU/L-ACNC: <0.007 UIU/ML (ref 0.45–4.5)
WBC # BLD AUTO: 5.18 THOUSAND/UL (ref 4.31–10.16)

## 2022-08-22 PROCEDURE — 84439 ASSAY OF FREE THYROXINE: CPT

## 2022-08-22 PROCEDURE — 36415 COLL VENOUS BLD VENIPUNCTURE: CPT

## 2022-08-22 PROCEDURE — 85025 COMPLETE CBC W/AUTO DIFF WBC: CPT

## 2022-08-22 PROCEDURE — 84443 ASSAY THYROID STIM HORMONE: CPT

## 2022-08-22 PROCEDURE — 84481 FREE ASSAY (FT-3): CPT

## 2022-08-22 PROCEDURE — 84445 ASSAY OF TSI GLOBULIN: CPT

## 2022-08-22 PROCEDURE — 80053 COMPREHEN METABOLIC PANEL: CPT

## 2022-08-24 LAB — TSI SER-ACNC: 0.15 IU/L (ref 0–0.55)

## 2022-08-25 ENCOUNTER — OFFICE VISIT (OUTPATIENT)
Dept: FAMILY MEDICINE CLINIC | Facility: CLINIC | Age: 22
End: 2022-08-25

## 2022-08-25 VITALS
RESPIRATION RATE: 18 BRPM | HEART RATE: 90 BPM | TEMPERATURE: 97.5 F | HEIGHT: 62 IN | SYSTOLIC BLOOD PRESSURE: 128 MMHG | BODY MASS INDEX: 23.63 KG/M2 | WEIGHT: 128.4 LBS | OXYGEN SATURATION: 98 % | DIASTOLIC BLOOD PRESSURE: 88 MMHG

## 2022-08-25 DIAGNOSIS — M54.9 BACK PAIN, UNSPECIFIED BACK LOCATION, UNSPECIFIED BACK PAIN LATERALITY, UNSPECIFIED CHRONICITY: ICD-10-CM

## 2022-08-25 DIAGNOSIS — Z59.9 FINANCIAL DIFFICULTIES: Primary | ICD-10-CM

## 2022-08-25 PROCEDURE — 99213 OFFICE O/P EST LOW 20 MIN: CPT | Performed by: FAMILY MEDICINE

## 2022-08-25 RX ORDER — CYCLOBENZAPRINE HCL 5 MG
5 TABLET ORAL 3 TIMES DAILY PRN
Qty: 60 TABLET | Refills: 1 | Status: SHIPPED | OUTPATIENT
Start: 2022-08-25 | End: 2022-09-23 | Stop reason: SDUPTHER

## 2022-08-25 RX ORDER — LIDOCAINE 40 MG/G
CREAM TOPICAL AS NEEDED
Qty: 30 G | Refills: 0 | Status: SHIPPED | OUTPATIENT
Start: 2022-08-25

## 2022-08-25 SDOH — ECONOMIC STABILITY - INCOME SECURITY: PROBLEM RELATED TO HOUSING AND ECONOMIC CIRCUMSTANCES, UNSPECIFIED: Z59.9

## 2022-08-25 NOTE — PATIENT INSTRUCTIONS
Back Pain   WHAT YOU NEED TO KNOW:   Back pain is common  You may have back pain and muscle spasms  You may feel sore or stiff on one or both sides of your back  The pain may spread to your lower body  Conditions that affect the spine, joints, or muscles can cause back pain  These may include arthritis, spinal stenosis (narrowing of the spinal column), muscle tension, or breakdown of the spinal discs  DISCHARGE INSTRUCTIONS:   Call your local emergency number (911 in the [address] [facility]) if:   You have severe back pain with chest pain  You cannot control your urine or bowel movements  Your pain becomes so severe that you cannot walk  Return to the emergency department if:   You have pain, numbness, or weakness in one or both legs  You have severe back pain, nausea, and vomiting  You have severe back pain that spreads to your side or genital area  Call your doctor if:   You have back pain that does not get better with rest and pain medicine  You have a fever  You have pain that worsens when you are on your back or when you rest     You have pain that worsens when you cough or sneeze  You lose weight without trying  You have questions or concerns about your condition or care  Medicines: You may need any of the following:  NSAIDs , such as ibuprofen, help decrease swelling, pain, and fever  This medicine is available with or without a doctor's order  NSAIDs can cause stomach bleeding or kidney problems in certain people  If you take blood thinner medicine, always ask your healthcare provider if NSAIDs are safe for you  Always read the medicine label and follow directions  Acetaminophen  decreases pain and fever  It is available without a doctor's order  Ask how much to take and how often to take it  Follow directions   Read the labels of all other medicines you are using to see if they also contain acetaminophen, or ask your doctor or pharmacist  Acetaminophen can cause liver damage if not taken correctly  Do not use more than 4 grams (4,000 milligrams) total of acetaminophen in one day  Muscle relaxers  help decrease muscle spasms and back pain  Prescription pain medicine  may be given  Ask your healthcare provider how to take this medicine safely  Some prescription pain medicines contain acetaminophen  Do not take other medicines that contain acetaminophen without talking to your healthcare provider  Too much acetaminophen may cause liver damage  Prescription pain medicine may cause constipation  Ask your healthcare provider how to prevent or treat constipation  Take your medicine as directed  Contact your healthcare provider if you think your medicine is not helping or if you have side effects  Tell him or her if you are allergic to any medicine  Keep a list of the medicines, vitamins, and herbs you take  Include the amounts, and when and why you take them  Bring the list or the pill bottles to follow-up visits  Carry your medicine list with you in case of an emergency  How to manage your back pain:   Apply ice  on your back for 15 to 20 minutes every hour or as directed  Use an ice pack, or put crushed ice in a plastic bag  Cover it with a towel before you apply it to your skin  Ice helps prevent tissue damage and decreases pain  Apply heat  on your back for 20 to 30 minutes every 2 hours for as many days as directed  Heat helps decrease pain and muscle spasms  Stay active  as much as you can without causing more pain  Bed rest could make your back pain worse  Avoid heavy lifting until your pain is gone  Go to physical therapy as directed  A physical therapist can teach you exercises to help improve movement and strength, and to decrease pain  Follow up with your doctor in 2 weeks, or as directed: You might need to see a specialist  Write down your questions so you remember to ask them during your visits    © Copyright [employer] 2022 Information is for End User's use only and may not be sold, redistributed or otherwise used for commercial purposes  All illustrations and images included in CareNotes® are the copyrighted property of [employer] Inc  or [employer]  The above information is an  only  It is not intended as medical advice for individual conditions or treatments  Talk to your doctor, nurse or pharmacist before following any medical regimen to see if it is safe and effective for you

## 2022-08-25 NOTE — LETTER
August 25, 2022     Patient: Pascual Martin  YOB: 2000  Date of Visit: 8/25/2022      To Whom it May Concern:    Pascual Martin is under my professional care  Abbi Valdez was seen in my office on 8/25/2022  Abbi Valdez may return to work on 8/25  Please excuse Abbi Valdez from participating in labor-intensive work for the time being, until otherwise cleared  If you have any questions or concerns, please don't hesitate to call           Sincerely,          Jenny Hannah MD        CC: No Recipients

## 2022-08-25 NOTE — PROGRESS NOTES
Assessment/Plan:    Back pain  Acute on chronic back pain worse in the past 2 weeks  Mid back and upper at neck  Pt wants MRI because she does not think it is muscle spasms like she was told previously  Not relieving with tylenol or advil  - recommend starting with Xray cervical and thoracic (last X ray >1 year ago) for underlying cause  - if persistent or worsening could consider MRI or ortho referral for further eval   - meanwhile continue supportive care  - trial flexeril 5 mg PRN and lidocaine cream  If not covered or too expensive, trial voltaren gel   - Does not want to see OMT because she is afraid it won't help and she is having insurance and financial struggles  Social work referral placed and she is open to having phone call start   - consider discussion for PT next visit however pt may not be amenable given finances and work schedule  - follow up as needed per pt preference after x rays          Diagnoses and all orders for this visit:    Financial difficulties  -     Ambulatory referral to social work care management program; Future    Back pain, unspecified back location, unspecified back pain laterality, unspecified chronicity  -     lidocaine (LMX) 4 % cream; Apply topically as needed for mild pain  -     cyclobenzaprine (FLEXERIL) 5 mg tablet; Take 1 tablet (5 mg total) by mouth 3 (three) times a day as needed for muscle spasms  -     XR spine cervical complete 4 or 5 vw non injury; Future  -     XR spine thoracic 3 vw; Future          Subjective:      Patient ID: Angel Hunt is a 23 y o  female  HPI  22 yo female presenting for back pain  6/10 mid and upper back pain  Wants MRI because she says this is not the first time this has happened to her  Was told she has muscle spasms but she knows it is not muscle spasms  Back pain feels like "knife in middle of back" and upper at the neck area started 2 weeks ago but pt says this is not a new issue  Staying the same, not getting worse  Does not go away  Previously on and off  Took advil which is not helpful, tylenol did not help either  Tried heat which did not help, ice bothers her so she has not tried  Right now the pain is a 6/10  Pt states sometimes it can feel work depending on position  Does not really do physical activity  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 and wants restrictions until she can figure out what it is  Works at [employer] so sometimes has to [employer], but that is not her job description normally  Previously thought it may have something to do with thyroid but she does not think so anymore  Denies numbness, tingling, urinary or fecal incontinence  The following portions of the patient's history were reviewed and updated as appropriate: allergies, current medications, past family history, past medical history, past social history, past surgical history and problem list     Review of Systems   Constitutional: Negative for chills and fever  HENT: Negative for congestion  Respiratory: Negative for cough and shortness of breath  Cardiovascular: Negative for chest pain and leg swelling  Gastrointestinal: Negative for abdominal pain, diarrhea, nausea and vomiting  Genitourinary: Negative for difficulty urinating  Musculoskeletal: Positive for back pain  Negative for myalgias  Skin: Negative for rash and wound  Neurological: Negative for dizziness, light-headedness, numbness and headaches  Psychiatric/Behavioral: Negative for agitation and confusion  Objective:    /88 (BP Location: Left arm, Patient Position: Sitting, Cuff Size: Standard)   Pulse 90   Temp 97 5 °F (36 4 °C) (Temporal)   Resp 18   Ht 5' 2" (1 575 m)   Wt 58 2 kg (128 lb 6 4 oz)   SpO2 98%   BMI 23 48 kg/m²      Physical Exam  Constitutional:       General: She is not in acute distress  Appearance: She is well-developed  HENT:      Head: Normocephalic and atraumatic     Eyes:      Extraocular Movements: Extraocular movements intact  Conjunctiva/sclera: Conjunctivae normal    Cardiovascular:      Rate and Rhythm: Normal rate and regular rhythm  Heart sounds: Normal heart sounds  No murmur heard  Pulmonary:      Effort: Pulmonary effort is normal  No respiratory distress  Breath sounds: Normal breath sounds  No wheezing or rales  Abdominal:      General: Bowel sounds are normal  There is no distension  Palpations: Abdomen is soft  Tenderness: There is no abdominal tenderness  Musculoskeletal:         General: No tenderness or deformity  Normal range of motion  Cervical back: Normal range of motion and neck supple  Skin:     General: Skin is warm and dry  Findings: No rash  Neurological:      General: No focal deficit present  Mental Status: She is alert  Mental status is at baseline     Psychiatric:         Mood and Affect: Mood normal          Behavior: Behavior normal              Racheal Chi MD, PGY3  [facility]  Date: 8/25/2022 Time: 4:04 PM

## 2022-08-25 NOTE — ASSESSMENT & PLAN NOTE
Acute on chronic back pain worse in the past 2 weeks  Mid back and upper at neck  Pt wants MRI because she does not think it is muscle spasms like she was told previously  Not relieving with tylenol or advil  - recommend starting with Xray cervical and thoracic (last X ray >1 year ago) for underlying cause  - if persistent or worsening could consider MRI or ortho referral for further eval   - meanwhile continue supportive care  - trial flexeril 5 mg PRN and lidocaine cream  If not covered or too expensive, trial voltaren gel   - Does not want to see OMT because she is afraid it won't help and she is having insurance and financial struggles   Social work referral placed and she is open to having phone call start   - consider discussion for PT next visit however pt may not be amenable given finances and work schedule  - follow up as needed per pt preference after x rays

## 2022-08-30 ENCOUNTER — APPOINTMENT (OUTPATIENT)
Dept: RADIOLOGY | Facility: MEDICAL CENTER | Age: 22
End: 2022-08-30
Payer: COMMERCIAL

## 2022-08-30 DIAGNOSIS — M54.9 BACK PAIN, UNSPECIFIED BACK LOCATION, UNSPECIFIED BACK PAIN LATERALITY, UNSPECIFIED CHRONICITY: ICD-10-CM

## 2022-08-30 PROCEDURE — 72050 X-RAY EXAM NECK SPINE 4/5VWS: CPT

## 2022-08-30 PROCEDURE — 72072 X-RAY EXAM THORAC SPINE 3VWS: CPT

## 2022-09-08 ENCOUNTER — PATIENT OUTREACH (OUTPATIENT)
Dept: FAMILY MEDICINE CLINIC | Facility: CLINIC | Age: 22
End: 2022-09-08

## 2022-09-08 NOTE — PROGRESS NOTES
SWCM received referral to follow up with patient for at risk responses for SDOH including financial strains, transportation and food insecurity  Chart review indicates that Xray is needed  Patient does not want to see OMT due to insurance and financial difficulties  NENACM outreached patient to discuss above, but she did not   SWCM left  and will await return phone call

## 2022-09-19 ENCOUNTER — PATIENT OUTREACH (OUTPATIENT)
Dept: FAMILY MEDICINE CLINIC | Facility: CLINIC | Age: 22
End: 2022-09-19

## 2022-09-19 NOTE — PROGRESS NOTES
NENA MATTHEWS outreached patient to follow up on referral for food insecurity and financial difficulties  NENA MATTHEWS introduced self and explained role  Patient reported that she has been denied SNAP benefits due to her income  NENA MATTHEWS offered to provide patient with information of local food vargas, patient denied  NENA MATTHEWS then inquired about reported financial difficulties  Patient reported that she is having a difficult time paying her hospital bills from [place] [facility] CM encouraged patient to contact [address] [place] [address] counselors to apply for [facility] or [facility]  Patient was receptive and agreed to follow up with the financial department  Patient reported that she is currently employed and has her own transportation  Patient denied any further needs at this time  NENA MATTHEWS provided contact information if patient needs any further assistance in the future  NENA MATTHEWS will continue to be available to provide psychosocial support as necessary

## 2022-09-23 ENCOUNTER — OFFICE VISIT (OUTPATIENT)
Dept: FAMILY MEDICINE CLINIC | Facility: CLINIC | Age: 22
End: 2022-09-23

## 2022-09-23 VITALS
RESPIRATION RATE: 16 BRPM | TEMPERATURE: 97.9 F | WEIGHT: 127.8 LBS | BODY MASS INDEX: 23.52 KG/M2 | SYSTOLIC BLOOD PRESSURE: 120 MMHG | HEART RATE: 92 BPM | OXYGEN SATURATION: 100 % | HEIGHT: 62 IN | DIASTOLIC BLOOD PRESSURE: 81 MMHG

## 2022-09-23 DIAGNOSIS — E05.90 HYPERTHYROIDISM: ICD-10-CM

## 2022-09-23 DIAGNOSIS — M54.9 BACK PAIN, UNSPECIFIED BACK LOCATION, UNSPECIFIED BACK PAIN LATERALITY, UNSPECIFIED CHRONICITY: ICD-10-CM

## 2022-09-23 DIAGNOSIS — Z00.00 ANNUAL PHYSICAL EXAM: Primary | ICD-10-CM

## 2022-09-23 PROCEDURE — 99395 PREV VISIT EST AGE 18-39: CPT | Performed by: FAMILY MEDICINE

## 2022-09-23 RX ORDER — PROPRANOLOL HYDROCHLORIDE 40 MG/1
40 TABLET ORAL 2 TIMES DAILY
Qty: 60 TABLET | Refills: 3 | Status: SHIPPED | OUTPATIENT
Start: 2022-09-23 | End: 2022-10-23

## 2022-09-23 RX ORDER — NORETHINDRONE ACETATE AND ETHINYL ESTRADIOL 1MG-20(21)
1 KIT ORAL DAILY
Qty: 84 TABLET | Refills: 3 | Status: SHIPPED | OUTPATIENT
Start: 2022-09-23

## 2022-09-23 RX ORDER — CYCLOBENZAPRINE HCL 5 MG
5 TABLET ORAL 3 TIMES DAILY PRN
Qty: 60 TABLET | Refills: 1 | Status: SHIPPED | OUTPATIENT
Start: 2022-09-23

## 2022-09-23 NOTE — ASSESSMENT & PLAN NOTE
-Due to Grave's disease given: uppressed TSH w/ elevated T4 and positive thyroid antibody  Thyroid uptake showed diffuse pattern    -Following with endocrinology   - Continue Methimazole 5mg 4 times a day, managed by endocrinology  Patient tolerating well, denies side effects   -continue propranolol 40 mg BID  Patient denies side effects and tolerating well  Notes improvement of chest pain  -repeat CBC, CMP, TSH,t3, t4 ordered by endocrinology; T3 and T4 improved  CBC and CMP wnl     -Discussed birth control options with patient given Grave's disease and no desire for children now  Patient started on Lo Loestrin  Counseling provided on OCP's

## 2022-09-23 NOTE — PATIENT INSTRUCTIONS
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
## 2022-09-23 NOTE — ASSESSMENT & PLAN NOTE
Acute on chronic back pain worse in the past 2 weeks  Mid back and upper at neck  Not relieving with tylenol or advil  Denies red flag symptoms:denies trauma, fevers, chills, hx of IV drug use, unexplained weight loss, urinary retention, fecal incontinence, saddle anesthesia, or   - xray of cervical spine (8/30) showing no fracture or pathologic bone lesion, degenerative changes,   thoracic vertebral alignment wnl  No displacement of paraspinal line  - Will continue flexeril 5 mg PRN  Reordered today  - Will trial voltaren gel  - Recommend heating pads    - Unsure of OMT as she is having insurance and financial struggles  Discussed options with patient and feel patient would benefit from OMT and patient is in agreement for a trial session of OMT  - Patient does not want PT at this time as she has tried this in the past without relief

## 2022-10-07 ENCOUNTER — OFFICE VISIT (OUTPATIENT)
Dept: FAMILY MEDICINE CLINIC | Facility: CLINIC | Age: 22
End: 2022-10-07

## 2022-10-07 DIAGNOSIS — M99.01 SOMATIC DYSFUNCTION OF SPINE, CERVICAL: ICD-10-CM

## 2022-10-07 DIAGNOSIS — M99.02 SOMATIC DYSFUNCTION OF SPINE, THORACIC: Primary | ICD-10-CM

## 2022-10-07 PROCEDURE — 99213 OFFICE O/P EST LOW 20 MIN: CPT | Performed by: FAMILY MEDICINE

## 2022-10-10 NOTE — PROGRESS NOTES
Assessment/Plan     This is a 23 y o  female who presents for OMT follow-up for:  1  Somatic dysfunction of spine, thoracic     2  Somatic dysfunction of spine, cervical         Plan:   1  Patient tolerated OMT well for the above problems,  advised patient to drink fluids and can use NSAID for soreness after treatment     2  OMT Follow up as needed for acute illness  Subjective     Pascual Martin is a 23 y o  female and is here for a OMT follow up  The patient reports mid back pain and neck pain for 3 weeks  Neck pain worse than back pain     Is the patient taking Pain medication? no  Has the patient completed physical therapy for this condition? no  Did Patient symptoms improve from last OMT appointment? yes        Review of Systems  Do you have pain that bothers you in your daily life? no  Review of Systems   Constitutional: Negative for fever  HENT: Negative for sore throat  Respiratory: Negative for cough and shortness of breath  Cardiovascular: Negative for chest pain and palpitations  Gastrointestinal: Negative for abdominal pain, constipation, diarrhea, nausea and vomiting  Genitourinary: Negative for dysuria, enuresis and hematuria  Musculoskeletal: Positive for back pain and neck pain  Negative for arthralgias  Neurological: Negative for seizures and syncope  All other systems reviewed and are negative        Objective     OMT Exam   Head:   Somatic Dysfunction:  Tissue Texture Changes, Asymmetry , Restriction and Tenderness  Severity :  2  Treatment Method: ME, MFR and CS  Response: improved  Cervical:   Somatic Dysfunction:  Tissue Texture Changes, Asymmetry , Restriction and Tenderness  Severity :  2  Treatment Method: ME, MFR and CS  Response: improved  Thoracic T1-4:   Somatic Dysfunction:  Tissue Texture Changes, Asymmetry , Restriction and Tenderness  Severity :  2  Treatment Method: HVLA and MFR  Response: improved  Thoracic T5-9:   Somatic Dysfunction:  Tissue Texture Changes, Asymmetry , Restriction and Tenderness  Severity :  2  Treatment Method: HVLA and MFR  Response: improved  Thoracic T10-12:  Somatic Dysfunction:  Tissue Texture Changes, Asymmetry , Restriction and Tenderness  Severity :  2  Treatment Method: HVLA, MFR and CS  Response: improved        Noa Wade, DO  Family Medicine  PGY [address], [place]

## 2022-10-28 ENCOUNTER — OFFICE VISIT (OUTPATIENT)
Dept: FAMILY MEDICINE CLINIC | Facility: CLINIC | Age: 22
End: 2022-10-28

## 2022-10-28 DIAGNOSIS — M54.2 CERVICAL SPINE PAIN: Primary | ICD-10-CM
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
## 2022-10-29 ENCOUNTER — APPOINTMENT (OUTPATIENT)
Dept: LAB | Facility: CLINIC | Age: 22
End: 2022-10-29

## 2022-10-29 DIAGNOSIS — Z00.00 ANNUAL PHYSICAL EXAM: ICD-10-CM

## 2022-10-29 DIAGNOSIS — E05.90 HYPERTHYROIDISM: ICD-10-CM

## 2022-10-29 LAB
T3FREE SERPL-MCNC: 3.4 PG/ML (ref 2.91–4.53)
T4 FREE SERPL-MCNC: 1.21 NG/DL (ref 0.78–1.33)
TSH SERPL DL<=0.05 MIU/L-ACNC: 0.23 UIU/ML (ref 0.45–4.5)

## 2022-10-31 DIAGNOSIS — E05.90 HYPERTHYROIDISM: Primary | ICD-10-CM

## 2022-10-31 LAB
GAMMA INTERFERON BACKGROUND BLD IA-ACNC: 0.04 IU/ML
M TB IFN-G BLD-IMP: NEGATIVE
M TB IFN-G CD4+ BCKGRND COR BLD-ACNC: 0.01 IU/ML
M TB IFN-G CD4+ BCKGRND COR BLD-ACNC: 0.02 IU/ML
MITOGEN IGNF BCKGRD COR BLD-ACNC: 3.21 IU/ML

## 2022-11-07 ENCOUNTER — OFFICE VISIT (OUTPATIENT)
Dept: FAMILY MEDICINE CLINIC | Facility: CLINIC | Age: 22
End: 2022-11-07

## 2022-11-07 DIAGNOSIS — M25.512 CHRONIC LEFT SHOULDER PAIN: Primary | ICD-10-CM

## 2022-11-07 DIAGNOSIS — M54.2 CERVICAL SPINE PAIN: ICD-10-CM

## 2022-11-07 DIAGNOSIS — G89.29 CHRONIC LEFT SHOULDER PAIN: Primary | ICD-10-CM

## 2022-11-07 NOTE — PROGRESS NOTES
Assessment/Plan     This is a 23 y o  female who presents for OMT follow-up for:  1  Chronic left shoulder pain     2  Cervical spine pain         Plan:   1  Patient tolerated OMT well for the above problems,  advised patient to drink fluids and can use NSAID for soreness after treatment     2  OMT Follow up as needed for acute illness  Subjective     Jackson Magana is a 23 y o  female and is here for a OMT follow up  The patient reports she doesn't need to use Voltaren gel as often any longer after last treatment  Is the patient taking Pain medication? yes  Has the patient completed physical therapy for this condition? Still seeing PT   Did Patient symptoms improve from last OMT appointment?  yes    The following portions of the patient's history were reviewed and updated as appropriate: allergies, current medications, past family history, past medical history, past social history, past surgical history and problem list     Review of Systems  Do you have pain that bothers you in your daily life? yes  Review of Systems    Objective     OMT Exam   Cervical:   Somatic Dysfunction:  Asymmetry , Restriction and Tenderness  Severity :  1  Osteopathic Findings: right upper trapezius tenderpoint   Treatment Method: CS  Response: improved  Left Upper Extremity:  Somatic Dysfunction:  Asymmetry  and Tenderness  Severity :  1  Osteopathic Findings: Left shoulder dysfunction, external rotation restriction   Treatment Method: ME  Response: improved          Princess Hendrickson, DO PGY-2  [facility]

## 2022-12-07 ENCOUNTER — OFFICE VISIT (OUTPATIENT)
Dept: FAMILY MEDICINE CLINIC | Facility: CLINIC | Age: 22
End: 2022-12-07

## 2022-12-07 VITALS
SYSTOLIC BLOOD PRESSURE: 128 MMHG | BODY MASS INDEX: 23.27 KG/M2 | WEIGHT: 127.2 LBS | RESPIRATION RATE: 18 BRPM | DIASTOLIC BLOOD PRESSURE: 90 MMHG | OXYGEN SATURATION: 96 % | HEART RATE: 86 BPM | TEMPERATURE: 97.6 F

## 2022-12-07 DIAGNOSIS — M54.6 CHRONIC BILATERAL THORACIC BACK PAIN: Primary | ICD-10-CM

## 2022-12-07 DIAGNOSIS — G89.29 CHRONIC BILATERAL THORACIC BACK PAIN: Primary | ICD-10-CM

## 2022-12-07 DIAGNOSIS — M94.0 COSTOCHONDRITIS: ICD-10-CM

## 2022-12-07 DIAGNOSIS — E05.90 HYPERTHYROIDISM: ICD-10-CM

## 2022-12-07 DIAGNOSIS — Z23 NEED FOR INFLUENZA VACCINATION: ICD-10-CM

## 2022-12-07 RX ORDER — METHIMAZOLE 5 MG/1
TABLET ORAL
Qty: 360 TABLET | Refills: 0 | Status: SHIPPED | OUTPATIENT
Start: 2022-12-07

## 2022-12-07 RX ORDER — PROPRANOLOL HYDROCHLORIDE 40 MG/1
40 TABLET ORAL 2 TIMES DAILY
Qty: 60 TABLET | Refills: 3 | Status: SHIPPED | OUTPATIENT
Start: 2022-12-07 | End: 2023-01-06

## 2022-12-07 NOTE — ASSESSMENT & PLAN NOTE
Acute on chronic back pain; Pain improved after OMT treatment  Mid back and upper at neck  Not relieving with tylenol or advil  Denies red flag symptoms:denies trauma, fevers, chills, hx of IV drug use, unexplained weight loss, urinary retention, fecal incontinence, saddle anesthesia, or   - xray of cervical spine (8/30) showing no fracture or pathologic bone lesion, degenerative changes, thoracic vertebral alignment wnl  No displacement of paraspinal line  - Will continue flexeril 5 mg PRN  -  voltaren gel trialed with no relief of symptoms  Patient states she has tried icy hot with relief of symptoms  Recommend continuing icy hot use as needed  - Recommend heating pads    - Patient evaluated and treated at OMT clinic  Patient states OMT was helping relieve her back pain and increase daily activities she was able to do with minimal pain  - Patient does not want PT at this time as she has tried this in the past without relief

## 2022-12-07 NOTE — ASSESSMENT & PLAN NOTE
Patient with hx of chest pain for 4 months  Remains unchanged  Chest pain is able to be pointed to and does not radiate elsewhere  Pain is sharp and stabbing in nature and last for approx 30 minutes  Chest pain occurs at rest    - EKG in July  Sinus tachycardia  - Pain likely 2/2 costochondritis given hx and physical exam findings  Recommend NSAID's

## 2022-12-07 NOTE — ASSESSMENT & PLAN NOTE
-Due to Grave's disease given: uppressed TSH w/ elevated T4 and positive thyroid antibody  Thyroid uptake showed diffuse pattern    -Following with endocrinology   - Continue Methimazole 5mg 4 times a day, managed by endocrinology  Patient tolerating well, denies side effects   -continue propranolol 40 mg BID  Patient denies side effects, denies symptoms of bradycardia and tolerating well  Patient inconsistent with taking medication  -repeat CBC, CMP, TSH,t3, t4 ordered by endocrinology; T3 and T4 improved  CBC and CMP wnl

## 2022-12-07 NOTE — PROGRESS NOTES
Name: Tenzin Hearn      : 2000      MRN: [id number]  Encounter Provider: Monica Ren MD  Encounter Date: 2022   Encounter department: [address] [place] [address]  Chronic bilateral thoracic back pain  Assessment & Plan:  Acute on chronic back pain; Pain improved after OMT treatment  Mid back and upper at neck  Not relieving with tylenol or advil  Denies red flag symptoms:denies trauma, fevers, chills, hx of IV drug use, unexplained weight loss, urinary retention, fecal incontinence, saddle anesthesia, or   - xray of cervical spine () showing no fracture or pathologic bone lesion, degenerative changes, thoracic vertebral alignment wnl  No displacement of paraspinal line  - Will continue flexeril 5 mg PRN  -  voltaren gel trialed with no relief of symptoms  Patient states she has tried icy hot with relief of symptoms  Recommend continuing icy hot use as needed  - Recommend heating pads    - Patient evaluated and treated at OMT clinic  Patient states OMT was helping relieve her back pain and increase daily activities she was able to do with minimal pain  - Patient does not want PT at this time as she has tried this in the past without relief  2  Hyperthyroidism  Assessment & Plan:  -Due to Grave's disease given: uppressed TSH w/ elevated T4 and positive thyroid antibody  Thyroid uptake showed diffuse pattern    -Following with endocrinology   - Continue Methimazole 5mg 4 times a day, managed by endocrinology  Patient tolerating well, denies side effects   -continue propranolol 40 mg BID  Patient denies side effects, denies symptoms of bradycardia and tolerating well  Patient inconsistent with taking medication  -repeat CBC, CMP, TSH,t3, t4 ordered by endocrinology; T3 and T4 improved  CBC and CMP wnl  Orders:  -     propranolol (INDERAL) 40 mg tablet;  Take 1 tablet (40 mg total) by mouth 2 (two) times a day  -     methimazole (TAPAZOLE) 5 mg tablet; 4 tabs daily  3  Costochondritis  Assessment & Plan:  Patient with hx of chest pain for 4 months  Remains unchanged  Chest pain is able to be pointed to and does not radiate elsewhere  Pain is sharp and stabbing in nature and last for approx 30 minutes  Chest pain occurs at rest    - EKG in July  Sinus tachycardia  - Pain likely 2/2 costochondritis given hx and physical exam findings  Recommend NSAID's        4  Need for influenza vaccination  -     influenza vaccine, quadrivalent, 0 5 mL, preservative-free, for adult and pediatric patients 6 mos+ (AFLURIA, FLUARIX, FLULAVAL, FLUZONE)         Subjective      Patient is a 22 yo female with pmhx of Graves disease presenting for follow up of chronic back pain  She states her back pain improved after OMT visits and she is needing the flexeril and Icy Hot less frequently  She states that she continues to have left sided chest pain that has remained unchanged  She states the pain occurs at rest and last for approx 30 minutes  The pain does not radiate anywhere else and she is able to point to location of pain  Pain not made worse with exertion or activity  She states the pain is made worse by pressing on the left side of her chest  She has not tried anything for this pain at home  She denies episodes of syncope, diaphoresis, dyspnea, palpitations, anxious feeling during chest pain, or jaw pain  Review of Systems   Constitutional: Negative for chills and fever  HENT: Negative for rhinorrhea and sore throat  Eyes: Negative for visual disturbance  Respiratory: Negative for cough and shortness of breath  Cardiovascular: Positive for chest pain  Negative for palpitations  Gastrointestinal: Negative for abdominal pain and nausea  Endocrine: Negative for cold intolerance and heat intolerance  Genitourinary: Negative for difficulty urinating and dysuria  Musculoskeletal: Positive for back pain and myalgias  Negative for arthralgias  Neurological: Negative for dizziness, light-headedness and headaches  Psychiatric/Behavioral: Negative for sleep disturbance  The patient is not nervous/anxious  Current Outpatient Medications on File Prior to Visit   Medication Sig   • cyclobenzaprine (FLEXERIL) 5 mg tablet Take 1 tablet (5 mg total) by mouth 3 (three) times a day as needed for muscle spasms   • multivitamin (THERAGRAN) TABS Take 1 tablet by mouth daily   • [DISCONTINUED] methimazole (TAPAZOLE) 5 mg tablet 4 tabs daily  • [DISCONTINUED] norethindrone-ethinyl estradiol (Loestrin Fe 1/20) 1-20 MG-MCG per tablet Take 1 tablet by mouth daily   • [DISCONTINUED] propranolol (INDERAL) 40 mg tablet Take 1 tablet (40 mg total) by mouth 2 (two) times a day   • ferrous sulfate 325 (65 Fe) mg tablet Take 325 mg by mouth daily with breakfast (Patient not taking: Reported on 3/18/2022)   • hydrocortisone 2 5 % lotion Apply topically 2 (two) times a day for 7 days (Patient not taking: Reported on 8/17/2022)   • lidocaine (LMX) 4 % cream Apply topically as needed for mild pain (Patient not taking: Reported on 9/23/2022)   • [DISCONTINUED] Diclofenac Sodium (VOLTAREN) 1 % Apply 2 g topically 4 (four) times a day       Objective     /90 (BP Location: Left arm, Patient Position: Sitting, Cuff Size: Standard)   Pulse 86   Temp 97 6 °F (36 4 °C) (Temporal)   Resp 18   Wt 57 7 kg (127 lb 3 2 oz)   SpO2 96%   BMI 23 27 kg/m²     Physical Exam  Constitutional:       General: She is not in acute distress  HENT:      Head: Normocephalic and atraumatic  Cardiovascular:      Rate and Rhythm: Normal rate and regular rhythm  Pulses: Normal pulses  Heart sounds: No murmur heard  Pulmonary:      Effort: Pulmonary effort is normal       Breath sounds: Normal breath sounds  No wheezing  Abdominal:      General: Abdomen is flat  Bowel sounds are normal       Palpations: Abdomen is soft  Tenderness:  There is no abdominal tenderness  Musculoskeletal:         General: Tenderness (to palpation of left sternal border) present  Normal range of motion  Skin:     General: Skin is warm  Neurological:      General: No focal deficit present  Mental Status: She is alert and oriented to person, place, and time  Cranial Nerves: No cranial nerve deficit         Don Jaimes MD

## 2023-01-10 ENCOUNTER — OFFICE VISIT (OUTPATIENT)
Dept: FAMILY MEDICINE CLINIC | Facility: CLINIC | Age: 23
End: 2023-01-10

## 2023-01-10 VITALS
OXYGEN SATURATION: 100 % | TEMPERATURE: 98.4 F | HEART RATE: 115 BPM | WEIGHT: 127 LBS | HEIGHT: 62 IN | DIASTOLIC BLOOD PRESSURE: 86 MMHG | BODY MASS INDEX: 23.37 KG/M2 | SYSTOLIC BLOOD PRESSURE: 124 MMHG | RESPIRATION RATE: 20 BRPM

## 2023-01-10 DIAGNOSIS — Z30.013 ENCOUNTER FOR INITIAL PRESCRIPTION OF INJECTABLE CONTRACEPTIVE: ICD-10-CM

## 2023-01-10 DIAGNOSIS — N91.2 AMENORRHEA: ICD-10-CM

## 2023-01-10 DIAGNOSIS — Z30.09 ENCOUNTER FOR COUNSELING REGARDING CONTRACEPTION: Primary | ICD-10-CM

## 2023-01-10 PROBLEM — N92.6 MENSTRUAL PROBLEM: Status: ACTIVE | Noted: 2023-01-10

## 2023-01-10 LAB — SL AMB POCT URINE HCG: NEGATIVE

## 2023-01-10 RX ORDER — MEDROXYPROGESTERONE ACETATE 150 MG/ML
150 INJECTION, SUSPENSION INTRAMUSCULAR ONCE
Status: COMPLETED | OUTPATIENT
Start: 2023-01-10 | End: 2023-01-10

## 2023-01-10 RX ADMIN — MEDROXYPROGESTERONE ACETATE 150 MG: 150 INJECTION, SUSPENSION INTRAMUSCULAR at 14:04

## 2023-01-10 NOTE — PROGRESS NOTES
Name: Olga Farmer      : 2000      MRN: [id number]  Encounter Provider: Roselia Arriola MD  Encounter Date: 1/10/2023   Encounter department: [address] [place] [address]     1  Encounter for counseling regarding contraception  Assessment & Plan:  · Typically menstruation every 28 days lasting 5-7 days with no dysmenorrhea or menorrhagia  · Started Loestrin in 2022 and began having dysmenorrhea, menorrhagia, and period lasting for two weeks  Pt stopped taking Loestrin due to side effects  · Patient does not desire pregnancy at this time  · POC urine pregnancy test negative in office today (1/10/23)    Plan:   · Counseling provided on other forms of contraception including IUD, Nexplanon, and Depo provera  · Patient preferred Depo provera  Side effects discussed with patient  · Will follow up in 3 months to discuss how she is tolerating the Depo provera  · Advised patient to continue to use condoms during intercourse as Depo provera does not provide protection from STI  2  Amenorrhea  Assessment & Plan:  Patient states period is 1 week late at this time  She states her period is always regular and occurs every 28 days  Associated symptoms of cramping, nausea, fatigue, and HA  Using condoms for contraception occasionally  POC pregnancy test in office negative     Orders:  -     POCT urine HCG    3  Encounter for initial prescription of injectable contraceptive  -     medroxyPROGESTERone (DEPO-PROVERA) IM injection 150 mg         Subjective      Pt is a 24 yo female with pmhx of Grave's disease who presents to the office today to discuss contraception options  Patient states that her menstrual cycle typically last for 5-7 days and is not heavy or painful  She states her period has comes every 28 days and is regular  She denies menorrhagia, dysmenorrhea HA, nausea, weight gain, or acne associated with her cycle   Patient began taking Loestrin for contraception in September and noticed that her period last almost 2 weeks and experienced dysmenorrhea and menorrhagia  She stopped taking OCP due to these symptoms and feels that her cycle has since returned to normal  However, patient states that her period is currently one week late  She also endorses symptoms of HA, nausea, and cramping at this time and has been using condoms occasionally  Review of Systems   Constitutional: Positive for fatigue  Negative for chills  HENT: Negative for rhinorrhea and sore throat  Eyes: Negative for visual disturbance  Respiratory: Negative for cough and shortness of breath  Cardiovascular: Negative for chest pain and palpitations  Gastrointestinal: Positive for nausea  Negative for abdominal pain and diarrhea  Genitourinary: Negative for difficulty urinating, dysuria, pelvic pain, vaginal bleeding and vaginal discharge  Musculoskeletal: Negative for arthralgias and myalgias  Neurological: Positive for headaches  Negative for dizziness and light-headedness  Psychiatric/Behavioral: Negative for agitation  The patient is not nervous/anxious  Current Outpatient Medications on File Prior to Visit   Medication Sig   • cyclobenzaprine (FLEXERIL) 5 mg tablet Take 1 tablet (5 mg total) by mouth 3 (three) times a day as needed for muscle spasms (Patient not taking: Reported on 1/10/2023)   • ferrous sulfate 325 (65 Fe) mg tablet Take 325 mg by mouth daily with breakfast (Patient not taking: Reported on 3/18/2022)   • hydrocortisone 2 5 % lotion Apply topically 2 (two) times a day for 7 days (Patient not taking: Reported on 8/17/2022)   • lidocaine (LMX) 4 % cream Apply topically as needed for mild pain (Patient not taking: Reported on 9/23/2022)   • methimazole (TAPAZOLE) 5 mg tablet 4 tabs daily   (Patient not taking: Reported on 1/10/2023)   • multivitamin (THERAGRAN) TABS Take 1 tablet by mouth daily (Patient not taking: Reported on 1/10/2023)   • propranolol (INDERAL) 40 mg tablet Take 1 tablet (40 mg total) by mouth 2 (two) times a day (Patient not taking: Reported on 1/10/2023)       Objective     /86 (BP Location: Left arm, Patient Position: Sitting, Cuff Size: Standard)   Pulse (!) 115   Temp 98 4 °F (36 9 °C) (Temporal)   Resp 20   Ht 5' 2" (1 575 m)   Wt 57 6 kg (127 lb)   SpO2 100%   BMI 23 23 kg/m²     Physical Exam  Constitutional:       General: She is not in acute distress  Appearance: Normal appearance  HENT:      Head: Normocephalic and atraumatic  Cardiovascular:      Rate and Rhythm: Normal rate and regular rhythm  Pulses: Normal pulses  Heart sounds: Normal heart sounds  Pulmonary:      Effort: Pulmonary effort is normal  No respiratory distress  Breath sounds: Normal breath sounds  Abdominal:      General: Abdomen is flat  Bowel sounds are normal       Palpations: Abdomen is soft  Tenderness: There is no abdominal tenderness  Musculoskeletal:         General: Normal range of motion  Cervical back: Normal range of motion  Skin:     General: Skin is warm  Neurological:      General: No focal deficit present  Mental Status: She is alert and oriented to person, place, and time     Psychiatric:         Mood and Affect: Mood normal          Behavior: Behavior normal        Darya Wagner MD

## 2023-01-10 NOTE — ASSESSMENT & PLAN NOTE
· Typically menstruation every 28 days lasting 5-7 days with no dysmenorrhea or menorrhagia  · Started Loestrin in September of 2022 and began having dysmenorrhea, menorrhagia, and period lasting for two weeks  Pt stopped taking Loestrin due to side effects  · Patient does not desire pregnancy at this time  · POC urine pregnancy test negative in office today (1/10/23)    Plan:   · Counseling provided on other forms of contraception including IUD, Nexplanon, and Depo provera  · Patient preferred Depo provera  Side effects discussed with patient  · Will follow up in 3 months to discuss how she is tolerating the Depo provera  · Advised patient to continue to use condoms during intercourse as Depo provera does not provide protection from STI

## 2023-01-10 NOTE — ASSESSMENT & PLAN NOTE
Patient states period is 1 week late at this time  She states her period is always regular and occurs every 28 days  Associated symptoms of cramping, nausea, fatigue, and HA  Using condoms for contraception occasionally    POC pregnancy test in office negative

## 2023-01-11 ENCOUNTER — OFFICE VISIT (OUTPATIENT)
Dept: ENDOCRINOLOGY | Facility: CLINIC | Age: 23
End: 2023-01-11

## 2023-01-11 ENCOUNTER — APPOINTMENT (OUTPATIENT)
Dept: LAB | Facility: CLINIC | Age: 23
End: 2023-01-11

## 2023-01-11 VITALS
HEIGHT: 62 IN | WEIGHT: 127 LBS | SYSTOLIC BLOOD PRESSURE: 126 MMHG | HEART RATE: 100 BPM | BODY MASS INDEX: 23.37 KG/M2 | OXYGEN SATURATION: 100 % | DIASTOLIC BLOOD PRESSURE: 64 MMHG

## 2023-01-11 DIAGNOSIS — E05.90 HYPERTHYROIDISM: Primary | ICD-10-CM

## 2023-01-11 LAB
ALBUMIN SERPL BCP-MCNC: 4 G/DL (ref 3.5–5)
ALP SERPL-CCNC: 66 U/L (ref 46–116)
ALT SERPL W P-5'-P-CCNC: 20 U/L (ref 12–78)
ANION GAP SERPL CALCULATED.3IONS-SCNC: 6 MMOL/L (ref 4–13)
AST SERPL W P-5'-P-CCNC: 12 U/L (ref 5–45)
B-HCG SERPL-ACNC: <2 MIU/ML
BASOPHILS # BLD AUTO: 0.04 THOUSANDS/ÂΜL (ref 0–0.1)
BASOPHILS NFR BLD AUTO: 1 % (ref 0–1)
BILIRUB SERPL-MCNC: 0.23 MG/DL (ref 0.2–1)
BUN SERPL-MCNC: 8 MG/DL (ref 5–25)
CALCIUM SERPL-MCNC: 8.9 MG/DL (ref 8.3–10.1)
CHLORIDE SERPL-SCNC: 105 MMOL/L (ref 96–108)
CO2 SERPL-SCNC: 26 MMOL/L (ref 21–32)
CREAT SERPL-MCNC: 0.83 MG/DL (ref 0.6–1.3)
EOSINOPHIL # BLD AUTO: 0.18 THOUSAND/ÂΜL (ref 0–0.61)
EOSINOPHIL NFR BLD AUTO: 3 % (ref 0–6)
ERYTHROCYTE [DISTWIDTH] IN BLOOD BY AUTOMATED COUNT: 13.8 % (ref 11.6–15.1)
GFR SERPL CREATININE-BSD FRML MDRD: 101 ML/MIN/1.73SQ M
GLUCOSE P FAST SERPL-MCNC: 97 MG/DL (ref 65–99)
HCT VFR BLD AUTO: 36.2 % (ref 34.8–46.1)
HGB BLD-MCNC: 11.4 G/DL (ref 11.5–15.4)
IMM GRANULOCYTES # BLD AUTO: 0.02 THOUSAND/UL (ref 0–0.2)
IMM GRANULOCYTES NFR BLD AUTO: 0 % (ref 0–2)
LYMPHOCYTES # BLD AUTO: 2.1 THOUSANDS/ÂΜL (ref 0.6–4.47)
LYMPHOCYTES NFR BLD AUTO: 30 % (ref 14–44)
MCH RBC QN AUTO: 27.4 PG (ref 26.8–34.3)
MCHC RBC AUTO-ENTMCNC: 31.5 G/DL (ref 31.4–37.4)
MCV RBC AUTO: 87 FL (ref 82–98)
MONOCYTES # BLD AUTO: 0.45 THOUSAND/ÂΜL (ref 0.17–1.22)
MONOCYTES NFR BLD AUTO: 7 % (ref 4–12)
NEUTROPHILS # BLD AUTO: 4.17 THOUSANDS/ÂΜL (ref 1.85–7.62)
NEUTS SEG NFR BLD AUTO: 59 % (ref 43–75)
NRBC BLD AUTO-RTO: 0 /100 WBCS
PLATELET # BLD AUTO: 310 THOUSANDS/UL (ref 149–390)
PMV BLD AUTO: 12.7 FL (ref 8.9–12.7)
POTASSIUM SERPL-SCNC: 3.8 MMOL/L (ref 3.5–5.3)
PROT SERPL-MCNC: 8.3 G/DL (ref 6.4–8.4)
RBC # BLD AUTO: 4.16 MILLION/UL (ref 3.81–5.12)
SODIUM SERPL-SCNC: 137 MMOL/L (ref 135–147)
T3FREE SERPL-MCNC: 2.86 PG/ML (ref 2.91–4.53)
T4 FREE SERPL-MCNC: 1.05 NG/DL (ref 0.78–1.33)
TSH SERPL DL<=0.05 MIU/L-ACNC: 0.74 UIU/ML (ref 0.45–4.5)
WBC # BLD AUTO: 6.96 THOUSAND/UL (ref 4.31–10.16)

## 2023-01-11 NOTE — PROGRESS NOTES
1/11/2023    Assessment/Plan      Diagnoses and all orders for this visit:    Hyperthyroidism  -     TSH, 3rd generation  -     T3, free  -     T4, free  -     hCG, quantitative- Lab Collect  -     Comprehensive metabolic panel Lab Collect  -     CBC and differential- Lab Collect        Assessment/Plan:  Hypothyroidism: She is due for lab work as ordered above and we will call with the results  Expect we will need to start a lower dose of an antithyroid medication and repeat labs 4 to 6 weeks later  We discussed considerations with regarding to avoiding strenuous exercise in pregnancy until thyroid levels are normal which they were at her last visit  We also discussed considerations regarding to frequent thyroid monitoring and different antithyroid medications during different trimesters  We will be in touch with patient as results are available  I asked her to go for lab work today  CC: Follow-up    History of Present Illness     HPI: Tenzin Hearn is a 23y o  year old female who presents for a follow-up appointment of hyperthyroidism  This was discovered during work-up for fatigue, chest pain, palpitations, shortness of breath, heat intolerance, difficulty sleeping  She did see cardiology for chest pain  She was started on methimazole  Uptake and scan and antibodies suggested Graves' disease as a diagnosis  She is prescribed methimazole 20 mg daily but has not taken this over the past 2 weeks and also has not taken her beta-blocker over that time  Since that time is noted more fatigue, heat intolerance, palpitations  She was concerned she might be pregnant so had stopped medication and had a negative urine pregnancy test yesterday  She denies any blurry vision or eye discomfort other than some mild dryness for which she states she will be seeing an eye doctor soon  She does not smoke  Review of Systems   Constitutional: Positive for fatigue     HENT: Negative for trouble swallowing and voice change  Eyes: Negative for visual disturbance  Respiratory: Negative for shortness of breath  Cardiovascular: Positive for palpitations  Negative for leg swelling  Gastrointestinal: Negative for abdominal pain, nausea and vomiting  Endocrine: Positive for heat intolerance  Negative for polydipsia and polyuria  Musculoskeletal: Negative for arthralgias and myalgias  Skin: Negative for rash  Neurological: Negative for dizziness, tremors and weakness  Hematological: Negative for adenopathy  Psychiatric/Behavioral: Negative for agitation and confusion  Historical Information   Past Medical History:   Diagnosis Date   • Pilonidal cyst    • Wears glasses      Past Surgical History:   Procedure Laterality Date   • INCISION AND DRAINAGE ABSCESS ANAL      Pilonidal Cyst  4 times-cut & drained in office-reoccurred   • OK EXCISION PILONIDAL CYST/SINUS COMPLICATED N/A 4/57/7971    Procedure: EXCISION PILONIDAL CYST;  Surgeon: Noel Spence MD;  Location: [facility];  Service: General   • OK EXCISION PILONIDAL CYST/SINUS SIMPLE N/A 4/5/2018    Procedure: EXCISION PILONIDAL CYST and debriding;  Surgeon: Noel Spence MD;  Location[facility];  Service: General     Social History   Social History     Substance and Sexual Activity   Alcohol Use No     Social History     Substance and Sexual Activity   Drug Use No     Social History     Tobacco Use   Smoking Status Never   Smokeless Tobacco Never     Family History:   Family History   Problem Relation Age of Onset   • No Known Problems Mother    • No Known Problems Father    • No Known Problems Brother    • Diabetes Maternal Grandmother    • Heart disease Maternal Grandmother    • Hypertension Maternal Grandmother    • Cancer Paternal Grandmother    • Cancer Other    • Cancer Other        Meds/Allergies   Current Outpatient Medications   Medication Sig Dispense Refill   • methimazole (TAPAZOLE) 5 mg tablet 4 tabs daily   360 tablet 0   • propranolol (INDERAL) 40 mg tablet Take 1 tablet (40 mg total) by mouth 2 (two) times a day 60 tablet 3   • cyclobenzaprine (FLEXERIL) 5 mg tablet Take 1 tablet (5 mg total) by mouth 3 (three) times a day as needed for muscle spasms (Patient not taking: Reported on 1/10/2023) 60 tablet 1   • ferrous sulfate 325 (65 Fe) mg tablet Take 325 mg by mouth daily with breakfast (Patient not taking: Reported on 3/18/2022)     • hydrocortisone 2 5 % lotion Apply topically 2 (two) times a day for 7 days (Patient not taking: Reported on 8/17/2022) 59 mL 0   • lidocaine (LMX) 4 % cream Apply topically as needed for mild pain (Patient not taking: Reported on 9/23/2022) 30 g 0   • multivitamin (THERAGRAN) TABS Take 1 tablet by mouth daily (Patient not taking: Reported on 1/10/2023)       No current facility-administered medications for this visit  Allergies   Allergen Reactions   • Sulfa Antibiotics Hives       Objective   Vitals: Blood pressure 126/64, pulse 100, height 5' 2" (1 575 m), weight 57 6 kg (127 lb), SpO2 100 %, not currently breastfeeding  Invasive Devices     None                 Physical Exam  Vitals reviewed  Constitutional:       General: She is not in acute distress  Appearance: She is well-developed  She is not diaphoretic  HENT:      Head: Normocephalic and atraumatic  Eyes:      Conjunctiva/sclera: Conjunctivae normal       Pupils: Pupils are equal, round, and reactive to light  Neck:      Thyroid: No thyromegaly  Cardiovascular:      Rate and Rhythm: Normal rate and regular rhythm  Pulmonary:      Effort: Pulmonary effort is normal  No respiratory distress  Breath sounds: Normal breath sounds  Abdominal:      General: Bowel sounds are normal       Palpations: Abdomen is soft  Musculoskeletal:         General: Normal range of motion  Cervical back: Normal range of motion and neck supple  Skin:     General: Skin is warm and dry  Findings: No rash     Neurological: Mental Status: She is alert and oriented to person, place, and time  Motor: No abnormal muscle tone  Psychiatric:         Behavior: Behavior normal          The history was obtained from the review of the chart and from the patient  Lab Results:      Recent Results (from the past 18637 hour(s))   Chlamydia/GC amplified DNA by PCR    Collection Time: 03/18/22  1:31 PM    Specimen: Cervix;  Genital   Result Value Ref Range    N gonorrhoeae, DNA Probe Negative Negative    Chlamydia trachomatis, DNA Probe Negative Negative   POCT wet mount    Collection Time: 03/18/22  4:00 PM   Result Value Ref Range    Yeast, Wet Prep neg     Whiff Test pos     Clue Cells pos     Trich, Wet Prep neg    Sedimentation rate, automated    Collection Time: 05/20/22 11:00 AM   Result Value Ref Range    Sed Rate 49 (H) 0 - 19 mm/hour   CBC and differential    Collection Time: 05/20/22 11:00 AM   Result Value Ref Range    WBC 5 38 4 31 - 10 16 Thousand/uL    RBC 4 15 3 81 - 5 12 Million/uL    Hemoglobin 11 2 (L) 11 5 - 15 4 g/dL    Hematocrit 35 8 34 8 - 46 1 %    MCV 86 82 - 98 fL    MCH 27 0 26 8 - 34 3 pg    MCHC 31 3 (L) 31 4 - 37 4 g/dL    RDW 13 8 11 6 - 15 1 %    MPV 13 5 (H) 8 9 - 12 7 fL    Platelets 482 110 - 467 Thousands/uL    nRBC 0 /100 WBCs    Neutrophils Relative 55 43 - 75 %    Immat GRANS % 0 0 - 2 %    Lymphocytes Relative 33 14 - 44 %    Monocytes Relative 11 4 - 12 %    Eosinophils Relative 1 0 - 6 %    Basophils Relative 0 0 - 1 %    Neutrophils Absolute 2 95 1 85 - 7 62 Thousands/µL    Immature Grans Absolute 0 01 0 00 - 0 20 Thousand/uL    Lymphocytes Absolute 1 76 0 60 - 4 47 Thousands/µL    Monocytes Absolute 0 59 0 17 - 1 22 Thousand/µL    Eosinophils Absolute 0 05 0 00 - 0 61 Thousand/µL    Basophils Absolute 0 02 0 00 - 0 10 Thousands/µL   TSH, 3rd generation    Collection Time: 05/20/22 11:00 AM   Result Value Ref Range    TSH 3RD GENERATON <0 007 (L) 0 450 - 4 500 uIU/mL   Comprehensive metabolic panel Collection Time: 05/20/22 11:00 AM   Result Value Ref Range    Sodium 136 136 - 145 mmol/L    Potassium 4 1 3 5 - 5 3 mmol/L    Chloride 104 100 - 108 mmol/L    CO2 25 21 - 32 mmol/L    ANION GAP 7 4 - 13 mmol/L    BUN 13 5 - 25 mg/dL    Creatinine 0 72 0 60 - 1 30 mg/dL    Glucose, Fasting 87 65 - 99 mg/dL    Calcium 9 5 8 3 - 10 1 mg/dL    AST 12 5 - 45 U/L    ALT 24 12 - 78 U/L    Alkaline Phosphatase 61 46 - 116 U/L    Total Protein 8 1 6 4 - 8 2 g/dL    Albumin 3 6 3 5 - 5 0 g/dL    Total Bilirubin 0 43 0 20 - 1 00 mg/dL    eGFR 120 ml/min/1 73sq m   Antinuclear Antibodies, by IFA    Collection Time: 05/20/22 11:00 AM   Result Value Ref Range    Antinuclear Antibodies, IFA Positive (A)    ANABELLE Staining Patterns    Collection Time: 05/20/22 11:00 AM   Result Value Ref Range    Homogeneous Pattern 1:320 (H)     Note: Comment    UA w Reflex to Microscopic w Reflex to Culture - Clinic Collect    Collection Time: 05/27/22 11:55 AM    Specimen: Urine   Result Value Ref Range    Color, UA Light Yellow     Clarity, UA Clear     Specific [place], UA 1 011 1 003 - 1 030    pH, UA 7 5 4 5, 5 0, 5 5, 6 0, 6 5, 7 0, 7 5, 8 0    Leukocytes, UA Negative Negative    Nitrite, UA Negative Negative    Protein, UA Negative Negative mg/dl    Glucose, UA Negative Negative mg/dl    Ketones, UA Negative Negative mg/dl    Urobilinogen, UA <2 0 <2 0 mg/dl mg/dl    Bilirubin, UA Negative Negative    Occult Blood, UA Large (A) Negative   TSH, 3rd generation with Free T4 reflex    Collection Time: 05/27/22 11:55 AM   Result Value Ref Range    TSH 3RD GENERATON <0 007 (L) 0 450 - 4 500 uIU/mL   Sjogren's Antibodies    Collection Time: 05/27/22 11:55 AM   Result Value Ref Range    SS-A (RO) Ab <0 2 0 0 - 0 9 AI    SS-B (LA) Ab <0 2 0 0 - 0 9 AI   Urine Microscopic    Collection Time: 05/27/22 11:55 AM   Result Value Ref Range    RBC, UA 1-2 None Seen, 1-2 /hpf    WBC, UA 2-4 (A) None Seen, 1-2 /hpf    Epithelial Cells Occasional None Seen, Occasional /hpf    Bacteria, UA None Seen None Seen, Occasional /hpf   T4, free    Collection Time: 05/27/22 11:55 AM   Result Value Ref Range    Free T4 2 25 (H) 0 78 - 1 33 ng/dL   Anti-microsomal antibody    Collection Time: 05/27/22 11:55 AM   Result Value Ref Range    THYROID MICROSOMAL ANTIBODY 173 (H) 0 - 34 IU/mL   Anti-thyroglobulin antibody    Collection Time: 05/27/22 11:55 AM   Result Value Ref Range    Thyroglobulin Ab 1 2 (H) 0 0 - 0 9 IU/mL   TSH, 3rd generation    Collection Time: 07/18/22  8:15 AM   Result Value Ref Range    TSH 3RD GENERATON <0 007 (L) 0 450 - 4 500 uIU/mL   T3, free    Collection Time: 07/18/22  8:15 AM   Result Value Ref Range    T3, Free 5 78 (H) 2 91 - 4 53 pg/mL   T4, free    Collection Time: 07/18/22  8:15 AM   Result Value Ref Range    Free T4 1 84 (H) 0 78 - 1 33 ng/dL   CBC and Platelet    Collection Time: 07/18/22  8:15 AM   Result Value Ref Range    WBC 3 76 (L) 4 31 - 10 16 Thousand/uL    RBC 4 86 3 81 - 5 12 Million/uL    Hemoglobin 12 4 11 5 - 15 4 g/dL    Hematocrit 40 5 34 8 - 46 1 %    MCV 83 82 - 98 fL    MCH 25 5 (L) 26 8 - 34 3 pg    MCHC 30 6 (L) 31 4 - 37 4 g/dL    RDW 13 8 11 6 - 15 1 %    Platelets 641 355 - 772 Thousands/uL    MPV 13 0 (H) 8 9 - 12 7 fL   Comprehensive metabolic panel    Collection Time: 08/22/22 12:49 PM   Result Value Ref Range    Sodium 135 135 - 147 mmol/L    Potassium 4 2 3 5 - 5 3 mmol/L    Chloride 105 96 - 108 mmol/L    CO2 28 21 - 32 mmol/L    ANION GAP 2 (L) 4 - 13 mmol/L    BUN 10 5 - 25 mg/dL    Creatinine 0 64 0 60 - 1 30 mg/dL    Glucose, Fasting 99 65 - 99 mg/dL    Calcium 9 3 8 3 - 10 1 mg/dL    AST 12 5 - 45 U/L    ALT 17 12 - 78 U/L    Alkaline Phosphatase 76 46 - 116 U/L    Total Protein 8 1 6 4 - 8 4 g/dL    Albumin 3 8 3 5 - 5 0 g/dL    Total Bilirubin 0 18 (L) 0 20 - 1 00 mg/dL    eGFR 128 ml/min/1 73sq m   CBC and differential    Collection Time: 08/22/22 12:49 PM   Result Value Ref Range    WBC 5 18 4 31 - 10 16 Thousand/uL    RBC 4 44 3 81 - 5 12 Million/uL    Hemoglobin 11 5 11 5 - 15 4 g/dL    Hematocrit 37 2 34 8 - 46 1 %    MCV 84 82 - 98 fL    MCH 25 9 (L) 26 8 - 34 3 pg    MCHC 30 9 (L) 31 4 - 37 4 g/dL    RDW 14 8 11 6 - 15 1 %    MPV 12 4 8 9 - 12 7 fL    Platelets 668 787 - 103 Thousands/uL    nRBC 0 /100 WBCs    Neutrophils Relative 58 43 - 75 %    Immat GRANS % 0 0 - 2 %    Lymphocytes Relative 32 14 - 44 %    Monocytes Relative 8 4 - 12 %    Eosinophils Relative 2 0 - 6 %    Basophils Relative 0 0 - 1 %    Neutrophils Absolute 2 92 1 85 - 7 62 Thousands/µL    Immature Grans Absolute 0 02 0 00 - 0 20 Thousand/uL    Lymphocytes Absolute 1 68 0 60 - 4 47 Thousands/µL    Monocytes Absolute 0 43 0 17 - 1 22 Thousand/µL    Eosinophils Absolute 0 12 0 00 - 0 61 Thousand/µL    Basophils Absolute 0 01 0 00 - 0 10 Thousands/µL   TSH, 3rd generation    Collection Time: 08/22/22 12:49 PM   Result Value Ref Range    TSH 3RD GENERATON <0 007 (L) 0 450 - 4 500 uIU/mL   T3, free    Collection Time: 08/22/22 12:49 PM   Result Value Ref Range    T3, Free 3 64 2 91 - 4 53 pg/mL   T4, free    Collection Time: 08/22/22 12:49 PM   Result Value Ref Range    Free T4 1 33 0 78 - 1 33 ng/dL   Thyroid stimulating immunoglobulin    Collection Time: 08/22/22 12:49 PM   Result Value Ref Range    TSI 0 15 0 00 - 0 55 IU/L   TSH, 3rd generation    Collection Time: 10/29/22 10:03 AM   Result Value Ref Range    TSH 3RD GENERATON 0 227 (L) 0 450 - 4 500 uIU/mL   T3, free    Collection Time: 10/29/22 10:03 AM   Result Value Ref Range    T3, Free 3 40 2 91 - 4 53 pg/mL   T4, free    Collection Time: 10/29/22 10:03 AM   Result Value Ref Range    Free T4 1 21 0 78 - 1 33 ng/dL   Quantiferon TB Gold Plus    Collection Time: 10/29/22 10:03 AM   Result Value Ref Range    QFT Nil 0 04 0 - 8 0 IU/ml    QFT TB1-NIL 0 01 IU/ml    QFT TB2-NIL 0 02 IU/ml    QFT Mitogen-NIL 3 21 IU/ml    QFT Final Interpretation Negative Negative   POCT urine HCG Collection Time: 01/10/23  1:57 PM   Result Value Ref Range    URINE HCG Negative          Future Appointments   Date Time Provider Katya Rebolledo   3/29/2023  1:00 PM MD Derik Sparks 64 [facility] Derik [facility]       Portions of the record may have been created with voice recognition software  Occasional wrong word or "sound a like" substitutions may have occurred due to the inherent limitations of voice recognition software  Read the chart carefully and recognize, using context, where substitutions have occurred

## 2023-01-12 DIAGNOSIS — E05.90 HYPERTHYROIDISM: ICD-10-CM

## 2023-01-12 RX ORDER — METHIMAZOLE 5 MG/1
TABLET ORAL
Qty: 360 TABLET | Refills: 0 | Status: SHIPPED | OUTPATIENT
Start: 2023-01-12 | End: 2023-03-23 | Stop reason: SDUPTHER

## 2023-03-09 ENCOUNTER — OFFICE VISIT (OUTPATIENT)
Dept: OBGYN CLINIC | Facility: CLINIC | Age: 23
End: 2023-03-09

## 2023-03-09 VITALS
BODY MASS INDEX: 23 KG/M2 | WEIGHT: 125 LBS | HEIGHT: 62 IN | SYSTOLIC BLOOD PRESSURE: 100 MMHG | DIASTOLIC BLOOD PRESSURE: 70 MMHG

## 2023-03-09 DIAGNOSIS — B96.89 BV (BACTERIAL VAGINOSIS): Primary | ICD-10-CM

## 2023-03-09 DIAGNOSIS — N76.0 BV (BACTERIAL VAGINOSIS): Primary | ICD-10-CM

## 2023-03-09 PROBLEM — Z30.09 ENCOUNTER FOR COUNSELING REGARDING CONTRACEPTION: Status: RESOLVED | Noted: 2023-01-10 | Resolved: 2023-03-09

## 2023-03-09 LAB
BV WHIFF TEST VAG QL: ABNORMAL
CLUE CELLS SPEC QL WET PREP: ABNORMAL
PH SMN: 5.5 [PH]
T VAGINALIS VAG QL WET PREP: ABNORMAL

## 2023-03-09 RX ORDER — FLUCONAZOLE 150 MG/1
TABLET ORAL
Qty: 2 TABLET | Refills: 0 | Status: SHIPPED | OUTPATIENT
Start: 2023-03-16 | End: 2023-03-19

## 2023-03-09 RX ORDER — METRONIDAZOLE 500 MG/1
500 TABLET ORAL EVERY 12 HOURS SCHEDULED
Qty: 14 TABLET | Refills: 0 | Status: SHIPPED | OUTPATIENT
Start: 2023-03-09 | End: 2023-03-17

## 2023-03-09 NOTE — PROGRESS NOTES
Diagnoses and all orders for this visit:    BV (bacterial vaginosis)  -     POCT wet mount  -     metroNIDAZOLE (FLAGYL) 500 mg tablet; Take 1 tablet (500 mg total) by mouth every 12 (twelve) hours for 7 days  -     fluconazole (DIFLUCAN) 150 mg tablet; Take one today and 1 in 3 days Do not start before March 16, 2023  Results for orders placed or performed in visit on 03/09/23   POCT wet mount   Result Value Ref Range    pH 5 5     Whiff Test pos     Clue Cells pos     Trich, Wet Prep neg          Reviewed perineal hygiene and products to avoid  Reviewed medications and usage instructions   Call if no symptom improvement, all questions answered, return for annual      [place] Tara is a 23 y o  female here for vaginal complaints  She reports increased vaginal discharge for past 2 week, yellow in color, slight odor, no itching or irritation  Denies any treatments tried  Denies recent antibiotic use  Denies douching  Denies fever, pelvic pain or dyspareunia  Denies new sexual partners  Does not use condoms , Depo [facility]    Vitals:    03/09/23 0940   BP: 100/70   BP Location: Left arm   Patient Position: Sitting   Cuff Size: Standard   Weight: 56 7 kg (125 lb)   Height: 5' 2" (1 575 m)     Body mass index is 22 86 kg/m²    Allergies   Allergen Reactions   • Sulfa Antibiotics Hives       Patient Active Problem List    Diagnosis Date Noted   • Amenorrhea 01/10/2023   • Hyperthyroidism 07/07/2022   • Positive VIDAL (antinuclear antibody) 05/24/2022   • Body aches 04/07/2022   • Secondary amenorrhea 01/27/2022   • Cervical spine pain 05/12/2021   • Back pain 05/12/2021   • Chronic left shoulder pain 04/24/2021   • Costochondritis 04/24/2021   • Encounter for well adolescent visit 01/06/2021   • Herpes 01/06/2021   • Pilonidal abscess 02/13/2020   • Encounter for well child visit at 18years of age 08/20/2019   • Encounter for Depo-Provera contraception 07/31/2019   • Need for meningitis vaccination 08/29/2018 Past Medical History:   Diagnosis Date   • Pilonidal cyst    • Wears glasses      Past Surgical History:   Procedure Laterality Date   • INCISION AND DRAINAGE ABSCESS ANAL      Pilonidal Cyst  4 times-cut & drained in office-reoccurred   • [place] EXCISION PILONIDAL CYST/SINUS COMPLICATED N/A 9/77/9193    Procedure: EXCISION PILONIDAL CYST;  Surgeon: Haley Alexander MD;  [facility];  Service: General   • MT EXCISION PILONIDAL CYST/SINUS SIMPLE N/A 4/5/2018    Procedure: EXCISION PILONIDAL CYST and debriding;  Surgeon: Haley Alexander MD;  [facility];  Service: General     Social History     Tobacco Use   • Smoking status: Never   • Smokeless tobacco: Never   Vaping Use   • Vaping Use: Never used   Substance Use Topics   • Alcohol use: No   • Drug use: No       Current Outpatient Medications:   •  [START ON 3/16/2023] fluconazole (DIFLUCAN) 150 mg tablet, Take one today and 1 in 3 days Do not start before March 16, 2023 , Disp: 2 tablet, Rfl: 0  •  methimazole (TAPAZOLE) 5 mg tablet, 1 tab daily  Dose change, Disp: 360 tablet, Rfl: 0  •  metroNIDAZOLE (FLAGYL) 500 mg tablet, Take 1 tablet (500 mg total) by mouth every 12 (twelve) hours for 7 days, Disp: 14 tablet, Rfl: 0      OB History   No obstetric history on file  Review of Systems   Constitutional: Negative for chills, fatigue, fever and unexpected weight change  Respiratory: Negative for shortness of breath  Gastrointestinal: Negative abdominal pain, constipation and diarrhea  Genitourinary: Negative for difficulty urinating, dysuria and hematuria  Physical Exam   Constitutional: Appears well-developed and well-nourished  No distress  Alert and oriented  HENT: Atraumatic, Normocephalic  Conjunctivae clear  Neck: Normal range of motion  Pulmonary: Effort normal   Abdominal: Soft   Negative for pain, tenderness or mass  Musculoskeletal: Normal ROM  Skin: Warm & Dry  Psychological: Normal mood, thought content, behavior & judgement       Pelvic exam was performed with patient supine, lithotomy position  Labia: Right: Negative rash, tenderness, lesion or injury               Left: Negative rash, tenderness, lesion or injury   Urethral meatus:  Negative for  tenderness, inflammation or discharge  Vagina: No erythema, tenderness, masses, or foreign body in the vagina  No signs of injury around the vagina  Thin yellowish  vaginal discharge   Perineum without lesions, signs of injury, erythema or swelling  Inguinal Canal:        Right: No inguinal adenopathy or hernia present  Left: No inguinal adenopathy or hernia present

## 2023-03-09 NOTE — PATIENT INSTRUCTIONS
Perineal Hygiene      Your vaginal naturally takes care of its self, it is a self washing system, the less you mess the healthier it will be     No soaps or feminine wash to the vulva, these products can cause dermitis, bacterial infections and other vulvar problems  Use only water to cleanse, or water with [employer] or [employer] if necessary  No scented lotions or products are advised in or near your vulva  Use only coconut oil for moisture if needed  No douching this may cause imbalance in your vaginal PH and further issues  If you wear panty liners, you may apply a thin coating of Vaseline, A&D ointment or coconut oil to the vulvar tissues as a skin barrier     Cotton underware, loose fitting clothing  Only perfume-free, dye-free laundry detergent, use a second rinse cycle   Avoid fabric softeners/dryer sheets  Partner should avoid the same products as well  Over the counter probiotic to restore vaginal madeline may be helpful as well, take daily  You may also look into Boric Acid vaginal suppositories to restore vaginal PH balance for up to 2 weeks as directed on the box  You may not use these if you are pregnant      For vaginal dryness: You may use:     Coconut oil (organic, pure, unscented) as needed for moisture or lubrication  ( Do not use if allergic)       Replens moisture restore external comfort gel daily ( use as directed on the box)        Replens long lasting vaginal moisturizer  ( use as directed on the box)         For Vaginal Lubrication:          You may use:     Coconut oil (organic, pure, unscented) as a lubricant or another scent-free lubricant (Astroglide, Uberlube) if needed    Do not use coconut oil or silicone if using a condom as this may break down the integrity of the condom and cause an unplanned pregnancy              Do not use coconut oil if allergic               Replens silky smooth lubricant, premium silicone based lubricant for intercourse  ( use as directed, a small amount will provide an enhanced natural feeling)     Any premium over the counter vaginal lubricant water or silicone based  Silicone based will have more staying power  Yeast Infection   AMBULATORY CARE:   A yeast infection , or vaginal candidiasis, is a common vaginal infection  A yeast infection is caused by a fungus, or yeast-like germ  Fungi are normally found in your vagina  Too many fungi can cause an infection  Common signs and symptoms: Thick, white, cheese-like discharge from your vagina    Itching, swelling, and redness in your vagina    Pain or burning when you urinate    Pain during sexual intercourse    Call your doctor or gynecologist if:   You have a fever and chills  You develop abdominal or pelvic pain  Your discharge is bloody and it is not your monthly period  Your signs and symptoms get worse, even after treatment  You have questions or concerns about your condition or care  Treatment for a yeast infection  includes medicines to treat the fungal infection and decrease inflammation  The medicine may be a pill, cream, ointment, or vaginal tablet or suppository  Keep your vagina healthy:   Clean your genital area with mild soap and warm water each day  Do not get soap inside your vagina  Gently dry the area after washing  Do not use hot tubs  The heat and moisture from hot tubs can increase your risk for another yeast infection  Always wipe from front to back  after you use the toilet  This prevents spreading bacteria from your rectal area into your vagina  Do not wear tight-fitting clothes or undergarments  for long periods of time  Wear cotton underwear during the day  Cotton helps keep your genital area dry and does not hold in warmth or moisture  Do not wear underwear at night  Do not douche  or use feminine hygiene sprays or bubble bath   Do not use pads or tampons that are scented, or colored or perfumed toilet paper  Do not have sex until your symptoms go away  Have your partner wear a condom until you complete your course of medication  Ask your healthcare provider about birth control options if necessary  Condoms have latex and diaphragms have gel that kills sperm  Both of these may irritate your genital area  Follow up with your doctor or gynecologist as directed:  Write down your questions so you remember to ask them during your visits  © Copyright [employer] 2022 Information is for End User's use only and may not be sold, redistributed or otherwise used for commercial purposes  The above information is an  only  It is not intended as medical advice for individual conditions or treatments  Talk to your doctor, nurse or pharmacist before following any medical regimen to see if it is safe and effective for you  Bacterial Vaginosis   AMBULATORY CARE:   Bacterial vaginosis  is an infection in the vagina  It may cause vaginitis (irritation and inflammation of the vagina)  The cause is not known  Bacteria normally found in the vagina are imbalanced  Your risk increases if you are sexually active, you use a douche, or you have an intrauterine device (IUD)  Common signs and symptoms of bacterial vaginosis:   White, gray, or yellow vaginal discharge    Vaginal discharge that smells like fish    Itching or burning around the outside of your vagina    Call your doctor or gynecologist if:   Your symptoms come back or do not improve with treatment  You have vaginal bleeding that is not your monthly period  You have questions or concerns about your condition or care  Antibiotics  are given to kill the bacteria  They may be given as a pill or as a cream to put in your vagina  Bacterial vaginosis and pregnancy:  If you have bacterial vaginosis during pregnancy, your baby may be born early or have a low birth weight   Your healthcare provider may recommend testing for bacterial vaginosis before or during your pregnancy  He or she will talk to you about your risk for premature delivery, and make sure you know the benefits and risks of testing  Prevent bacterial vaginosis:   Keep your vaginal area clean and dry  Wear underwear and pantyhose with a cotton crotch  Wipe from front to back after you urinate or have a bowel movement  After you bathe, rinse soap from your vaginal area to decrease your risk for irritation  Do not use products that cause irritation  Always use unscented tampons or sanitary pads  Do not use feminine sprays, powders, or scented tampons  They may cause irritation and increase your risk for vaginosis  Detergents and fabric softeners may also cause irritation  Do not use a douche  This can cause an imbalance in healthy vaginal bacteria  Use latex condoms during sex  This helps prevent another infection and keeps your partner from getting the infection  Follow up with your doctor or gynecologist as directed: Bacterial vaginosis increases the risk for several health problems, such as pelvic inflammatory disease (PID) or sexually transmitted infections  Work with your healthcare providers to schedule regular appointments to check for health problems  Write down your questions so you remember to ask them during your visits  © Copyright [employer] 2022 Information is for End User's use only and may not be sold, redistributed or otherwise used for commercial purposes  The above information is an  only  It is not intended as medical advice for individual conditions or treatments  Talk to your doctor, nurse or pharmacist before following any medical regimen to see if it is safe and effective for you

## 2023-03-16 ENCOUNTER — APPOINTMENT (OUTPATIENT)
Dept: LAB | Facility: CLINIC | Age: 23
End: 2023-03-16

## 2023-03-16 DIAGNOSIS — E05.90 HYPERTHYROIDISM: ICD-10-CM

## 2023-03-16 LAB — T3FREE SERPL-MCNC: 2.54 PG/ML (ref 2.91–4.53)

## 2023-03-17 ENCOUNTER — OFFICE VISIT (OUTPATIENT)
Dept: FAMILY MEDICINE CLINIC | Facility: CLINIC | Age: 23
End: 2023-03-17

## 2023-03-17 VITALS
SYSTOLIC BLOOD PRESSURE: 116 MMHG | OXYGEN SATURATION: 98 % | HEIGHT: 62 IN | TEMPERATURE: 98.1 F | HEART RATE: 90 BPM | DIASTOLIC BLOOD PRESSURE: 81 MMHG | WEIGHT: 125 LBS | BODY MASS INDEX: 23 KG/M2 | RESPIRATION RATE: 16 BRPM

## 2023-03-17 DIAGNOSIS — E05.00 GRAVES DISEASE: ICD-10-CM

## 2023-03-17 DIAGNOSIS — R10.814 LEFT LOWER QUADRANT ABDOMINAL TENDERNESS WITHOUT REBOUND TENDERNESS: ICD-10-CM

## 2023-03-17 DIAGNOSIS — K92.1 BLOOD IN STOOL: Primary | ICD-10-CM

## 2023-03-17 LAB
T4 FREE SERPL-MCNC: 1.05 NG/DL (ref 0.78–1.33)
TSH SERPL DL<=0.05 MIU/L-ACNC: 0.61 UIU/ML (ref 0.45–4.5)

## 2023-03-17 RX ORDER — PROPRANOLOL HYDROCHLORIDE 40 MG/1
40 TABLET ORAL 2 TIMES DAILY
Qty: 180 TABLET | Refills: 1 | Status: SHIPPED | OUTPATIENT
Start: 2023-03-17

## 2023-03-17 NOTE — ASSESSMENT & PLAN NOTE
-pt with 1 month of solid, grossly bloody stool  -on exam today: left lower quadrant tenderness without rebound or guarding; pt declines rectal exam today   -ddx: crohns vs UC vs diverticulitis vs hemorrhoids/fissure    Plan:  · CT abdomen/pelv w/ contrast ordered   · Referral to GI and colorectal given; recommend pt to call both and see where she can get an appointment soonest  · FIT test given   · Will check CBC for acute blood loss anemia

## 2023-03-17 NOTE — PROGRESS NOTES
Name: Mundo Foster      : 2000      MRN: [id number]  Encounter Provider: Juan Luis Shin DO  Encounter Date: 3/17/2023   Encounter department: [address] [place] [address]  Blood in stool  Assessment & Plan:  -pt with 1 month of solid, grossly bloody stool  -on exam today: left lower quadrant tenderness without rebound or guarding; pt declines rectal exam today   -ddx: crohns vs UC vs diverticulitis vs hemorrhoids/fissure    Plan:  · CT abdomen/pelv w/ contrast ordered   · Referral to GI and colorectal given; recommend pt to call both and see where she can get an appointment soonest  · FIT test given   · Will check CBC for acute blood loss anemia    Orders:  -     CT abdomen pelvis w contrast; Future; Expected date: 2023  -     Occult Blood, Fecal Immunochemical; Future  -     Ambulatory Referral to Gastroenterology; Future  -     Ambulatory Referral to Colorectal Surgery; Future  -     CBC and differential; Future    2  Left lower quadrant abdominal tenderness without rebound tenderness  -     CT abdomen pelvis w contrast; Future; Expected date: 2023  -     Occult Blood, Fecal Immunochemical; Future  -     Ambulatory Referral to Gastroenterology; Future  -     Ambulatory Referral to Colorectal Surgery; Future  -     CBC and differential; Future    3  Graves disease  -     propranolol (INDERAL) 40 mg tablet; Take 1 tablet (40 mg total) by mouth 2 (two) times a day         Subjective      Has been having bloody bowel movements for the past month  Reports the stool is grossly bloody and solid  Every bowel movement has bloody  Denies any pain in rectum area, but reports she does have to strain to go to the bathroom which is painful  Endorses nausea and abdominal pain, with no vomiting  Denies fevers/chills  There is a family history of Crohn's disease in brother  Brother was diagnosed with Crohn's at age 25  Pt has Grave's disease  Requests refill of her propranolol  Review of Systems   Constitutional: Negative for chills and fever  HENT: Negative for ear pain and sore throat  Eyes: Negative for pain and visual disturbance  Respiratory: Negative for cough and shortness of breath  Cardiovascular: Negative for chest pain and palpitations  Gastrointestinal: Positive for abdominal pain and blood in stool  Negative for constipation, diarrhea, rectal pain and vomiting  Genitourinary: Negative for dysuria and hematuria  Musculoskeletal: Negative for arthralgias and back pain  Skin: Negative for color change and rash  Neurological: Negative for seizures and syncope  All other systems reviewed and are negative  Current Outpatient Medications on File Prior to Visit   Medication Sig   • methimazole (TAPAZOLE) 5 mg tablet 1 tab daily  Dose change   • fluconazole (DIFLUCAN) 150 mg tablet Take one today and 1 in 3 days Do not start before March 16, 2023  (Patient not taking: Reported on 3/17/2023)   • metroNIDAZOLE (FLAGYL) 500 mg tablet Take 1 tablet (500 mg total) by mouth every 12 (twelve) hours for 7 days (Patient not taking: Reported on 3/17/2023)       Objective     /81 (BP Location: Left arm, Patient Position: Sitting, Cuff Size: Standard)   Pulse 90   Temp 98 1 °F (36 7 °C) (Temporal)   Resp 16   Ht 5' 2" (1 575 m)   Wt 56 7 kg (125 lb)   SpO2 98%   BMI 22 86 kg/m²     Physical Exam  Constitutional:       General: She is not in acute distress  Appearance: Normal appearance  She is not ill-appearing or toxic-appearing  HENT:      Head: Normocephalic and atraumatic  Right Ear: External ear normal       Left Ear: External ear normal       Nose: Nose normal       Mouth/Throat:      Mouth: Mucous membranes are moist       Pharynx: Oropharynx is clear  Eyes:      General: No scleral icterus       Conjunctiva/sclera: Conjunctivae normal    Cardiovascular:      Rate and Rhythm: Normal rate and regular rhythm  Heart sounds: Normal heart sounds  No murmur heard  Pulmonary:      Effort: Pulmonary effort is normal       Breath sounds: Normal breath sounds  No wheezing, rhonchi or rales  Abdominal:      General: Bowel sounds are normal       Palpations: Abdomen is soft  Tenderness: There is abdominal tenderness (tenderness to palpation of left lower qudrant)  There is no guarding or rebound  Musculoskeletal:         General: Normal range of motion  Cervical back: Neck supple  Skin:     General: Skin is warm and dry  Coloration: Skin is not jaundiced  Neurological:      General: No focal deficit present  Mental Status: She is alert and oriented to person, place, and time     Psychiatric:         Mood and Affect: Mood normal          Behavior: Behavior normal        Milus Dalton, DO

## 2023-03-19 LAB — TSI SER-ACNC: 0.13 IU/L (ref 0–0.55)

## 2023-03-23 DIAGNOSIS — E05.90 HYPERTHYROIDISM: Primary | ICD-10-CM

## 2023-03-23 RX ORDER — METHIMAZOLE 5 MG/1
TABLET ORAL
Qty: 180 TABLET | Refills: 0 | Status: SHIPPED | OUTPATIENT
Start: 2023-03-23 | End: 2023-05-15

## 2023-04-06 ENCOUNTER — OFFICE VISIT (OUTPATIENT)
Dept: FAMILY MEDICINE CLINIC | Facility: CLINIC | Age: 23
End: 2023-04-06

## 2023-04-06 VITALS
HEIGHT: 62 IN | BODY MASS INDEX: 23 KG/M2 | OXYGEN SATURATION: 98 % | SYSTOLIC BLOOD PRESSURE: 118 MMHG | RESPIRATION RATE: 16 BRPM | WEIGHT: 125 LBS | TEMPERATURE: 97.5 F | DIASTOLIC BLOOD PRESSURE: 78 MMHG | HEART RATE: 89 BPM

## 2023-04-06 DIAGNOSIS — G89.29 CHRONIC MIDLINE LOW BACK PAIN WITHOUT SCIATICA: ICD-10-CM

## 2023-04-06 DIAGNOSIS — Z13.220 LIPID SCREENING: ICD-10-CM

## 2023-04-06 DIAGNOSIS — Z13.1 SCREENING FOR DIABETES MELLITUS: ICD-10-CM

## 2023-04-06 DIAGNOSIS — K92.1 BLOOD IN STOOL: ICD-10-CM

## 2023-04-06 DIAGNOSIS — Z83.79 FAMILY HISTORY OF CROHN'S DISEASE: ICD-10-CM

## 2023-04-06 DIAGNOSIS — M25.50 ARTHRALGIA, UNSPECIFIED JOINT: ICD-10-CM

## 2023-04-06 DIAGNOSIS — M54.50 CHRONIC MIDLINE LOW BACK PAIN WITHOUT SCIATICA: ICD-10-CM

## 2023-04-06 DIAGNOSIS — Z11.4 SCREENING FOR HIV (HUMAN IMMUNODEFICIENCY VIRUS): ICD-10-CM

## 2023-04-06 DIAGNOSIS — Z76.89 ENCOUNTER TO ESTABLISH CARE WITH NEW DOCTOR: Primary | ICD-10-CM

## 2023-04-06 DIAGNOSIS — E05.00 GRAVES DISEASE: ICD-10-CM

## 2023-04-06 RX ORDER — METHOCARBAMOL 500 MG/1
500 TABLET, FILM COATED ORAL 2 TIMES DAILY PRN
Qty: 20 TABLET | Refills: 0 | Status: SHIPPED | OUTPATIENT
Start: 2023-04-06

## 2023-04-06 NOTE — PROGRESS NOTES
Assessment/Plan:           Problem List Items Addressed This Visit        Other    Back pain    Relevant Orders    XR spine cervical complete 4 or 5 vw non injury    XR spine lumbar minimum 4 views non injury    XR sacroiliac joints 3+ views    Comprehensive metabolic panel    Vitamin D Panel    Blood in stool    Relevant Orders    CBC and differential    Iron Panel (Includes Ferritin, Iron Sat%, Iron, and TIBC)    UA (URINE) with reflex to Scope    VIDAL Screen w/ Reflex to Titer/Pattern    C-reactive protein    Cyclic citrul peptide antibody, IgG    RF Screen w/ Reflex to Titer    Sjogren's Antibodies    Lyme Total Antibody Profile with reflex to WB   Other Visit Diagnoses     Encounter to establish care with new doctor    -  Primary    Graves disease        Relevant Orders    Vitamin D Panel    Arthralgia, unspecified joint        Relevant Orders    Comprehensive metabolic panel    Vitamin D Panel    VIDAL Screen w/ Reflex to Titer/Pattern    C-reactive protein    Cyclic citrul peptide antibody, IgG    RF Screen w/ Reflex to Titer    Sjogren's Antibodies    Lyme Total Antibody Profile with reflex to WB    Family history of Crohn's disease        Relevant Orders    Vitamin D Panel    VIDAL Screen w/ Reflex to Titer/Pattern    C-reactive protein    Cyclic citrul peptide antibody, IgG    RF Screen w/ Reflex to Titer    Sjogren's Antibodies    Lyme Total Antibody Profile with reflex to WB    Lipid screening        Relevant Orders    Lipid panel    Screening for diabetes mellitus        Relevant Orders    Comprehensive metabolic panel        Patient has Graves' disease GI consultation for bleeding per rectum going on for a month  I suspect inflammatory bowel disease  She does have significant arthralgia out of proportion to her age  Suspect reactive arthropathy  See discussion above close follow-up  This note was completed in part utilizing [employer]    Grammatical errors, random words insertions, spelling mistakes, incomplete sentences can be occasional consequence of this system secondary to software limitations, ambient noise, and hardware issues  If you have any questions or concerns about the content, text or information contained within the body of this dictation, please contact the provider for clarification      Subjective:      Patient ID: Edgar Horn is a 26 y o  female  HPI   This is a pleasant 22-year-old female who is here to establish care  She is a medical assistant rotating through our office  She was diagnosed with Graves' disease last year and is maintained on methimazole and propranolol under care of [facility] endocrinology  Lately she has been experiencing abdominal pain and bleeding per rectum  This has been going on for about a month  She was referred to GI and has an appointment in a couple of weeks  CT scan of the abdomen and pelvis was ordered which is pending  Patient had strong family history of autoimmune disease in addition to her personal history  Her brother who is 3years older has Crohn's disease  Sister has diabetes and asthma  Maternal aunt has psoriasis  With the last 1 year she also has been experiencing arthralgia to the extent where she has to take muscle relaxer and several pills of Tylenol several times a day  She does not report any morning stiffness  I suspect inflammatory arthritis  X-rays of the neck lower back and SI joints will be ordered  CRP would be ordered  Consider rheumatology consultation after reviewing above  She does drink red bull because it helps her with fatigue  I educated her this would worsen her heart which is already tachycardic given her Graves' disease  She understands and will try to get back  Patient has been seen by cardiology in the past   Studies will be ordered including diabetes        The following portions of the patient's history were reviewed and updated as appropriate: allergies, current medications, "past family history, past medical history, past social history, past surgical history and problem list     Review of Systems   Skin:        Denies any skin rash         Objective:      /78 (BP Location: Left arm, Patient Position: Sitting, Cuff Size: Standard)   Pulse 89   Temp 97 5 °F (36 4 °C) (Tympanic)   Resp 16   Ht 5' 2\" (1 575 m)   Wt 56 7 kg (125 lb)   SpO2 98%   BMI 22 86 kg/m²          Physical Exam  Constitutional:       General: She is in acute distress (Mild distress by laying flat due to back )  Neck:      Vascular: No carotid bruit  Cardiovascular:      Rate and Rhythm: Regular rhythm  Tachycardia present  Heart sounds: Normal heart sounds  No murmur heard  Comments: Mild tachycardia  Pulmonary:      Effort: Pulmonary effort is normal  No respiratory distress  Breath sounds: Normal breath sounds  No wheezing or rales  Abdominal:      General: There is no distension  Palpations: There is no mass  Tenderness: There is abdominal tenderness  There is no guarding or rebound  Hernia: No hernia is present  Musculoskeletal:      Right lower leg: No edema  Left lower leg: No edema  Comments: Positive paraspinal muscle spasm   Lymphadenopathy:      Cervical: No cervical adenopathy  Skin:     Coloration: Skin is not pale  Neurological:      Mental Status: She is alert                   "

## 2023-04-26 ENCOUNTER — OFFICE VISIT (OUTPATIENT)
Dept: GASTROENTEROLOGY | Facility: CLINIC | Age: 23
End: 2023-04-26

## 2023-04-26 VITALS
TEMPERATURE: 99.3 F | SYSTOLIC BLOOD PRESSURE: 118 MMHG | HEIGHT: 62 IN | WEIGHT: 127.6 LBS | BODY MASS INDEX: 23.48 KG/M2 | DIASTOLIC BLOOD PRESSURE: 78 MMHG

## 2023-04-26 DIAGNOSIS — K59.00 CONSTIPATION, UNSPECIFIED CONSTIPATION TYPE: ICD-10-CM

## 2023-04-26 DIAGNOSIS — R10.814 LEFT LOWER QUADRANT ABDOMINAL TENDERNESS WITHOUT REBOUND TENDERNESS: ICD-10-CM

## 2023-04-26 DIAGNOSIS — K92.1 BLOOD IN STOOL: Primary | ICD-10-CM

## 2023-04-26 DIAGNOSIS — R10.13 EPIGASTRIC PAIN: ICD-10-CM

## 2023-04-26 RX ORDER — POLYETHYLENE GLYCOL 3350 17 G/17G
17 POWDER, FOR SOLUTION ORAL DAILY
Qty: 30 EACH | Refills: 5 | Status: SHIPPED | OUTPATIENT
Start: 2023-04-26

## 2023-04-26 RX ORDER — POLYETHYLENE GLYCOL 3350, SODIUM SULFATE ANHYDROUS, SODIUM BICARBONATE, SODIUM CHLORIDE, POTASSIUM CHLORIDE 236; 22.74; 6.74; 5.86; 2.97 G/4L; G/4L; G/4L; G/4L; G/4L
4000 POWDER, FOR SOLUTION ORAL ONCE
Qty: 4000 ML | Refills: 0 | Status: SHIPPED | OUTPATIENT
Start: 2023-04-26 | End: 2023-04-26

## 2023-04-26 RX ORDER — LINACLOTIDE 72 UG/1
72 CAPSULE, GELATIN COATED ORAL DAILY
Qty: 30 CAPSULE | Refills: 3 | Status: SHIPPED | OUTPATIENT
Start: 2023-04-26

## 2023-04-26 NOTE — PROGRESS NOTES
[facility] - Outpatient Consultation  Laura Montilla 26 y o  female MRN: [id number]  Encounter: [id number]    PCP:  Edgar Waterman MD, [phone]  Referring Provider:  Tim Frey DO, [phone]        ASSESSMENT AND PLAN:      21-year-old female with history significant for Graves' disease, whom we are seeing in consultation for 2-month history of abdominal bloating immediately following meals along with associated nausea and hematochezia and longstanding history of severe constipation refractory to MiraLAX use  Suspect patient's epigastric pain and bloating are likely related to severe constipation  However, differential diagnosis also includes H  pylori, gastritis, duodenitis, PUD  Given her diet predominantly consisting of carbohydrates, I discussed the importance of following a low FODMAP diet which may also alleviate her symptoms of abdominal bloating with meals  Given her personal history of autoimmune disease as well as family history of recently diagnosed Crohn's disease in her brother, her new onset hematochezia warrants further endoscopic evaluation with colonoscopy to rule out inflammatory bowel disease  We will plan for endoscopy at the same time  She reports significant history of longstanding constipation without improvement with MiraLAX  We emphasized the importance of adequate fluid and fiber intake  She is willing to consider a trial of Linzess  -EGD and colonoscopy with gastric, duodenal, and random colon biopsies  -Trial of Linzess 72 mcg daily for severe constipation refractory to MiraLAX  -Encouraged increased fluid intake as well as fiber   -Counseled on low FODMAP diet  FOLLOW-UP:  Return in about 3 months (around 7/26/2023)  VISIT DIAGNOSES AND ORDERS:      1  Blood in stool    2  Left lower quadrant abdominal tenderness without rebound tenderness    3  Epigastric pain    4   Constipation, unspecified constipation type      Orders Placed This Encounter   Procedures   • Colonoscopy   • EGD     ______________________________________________________________________    HPI:  Ms Laura Montilla is a 26 y o  female with history significant for Graves disease, whom we are seeing in consultation for hematochezia  She reports 2 month history of significant bloating post meals after every meal  She also notes hematochezia which fills the toilet bowel and is mixed in with stool as well black stool  She reports abx course 1 month ago for a vaginal infection  Endorses associated nausea immediately following meals, She reports moving her bowels once or twice a week  The only thing that helps moves her bowels is dairy  She notes significant abdominal cramping following bowel movements  Denies tobacco or illicit drug use  Occasional alcohol use  Denies NSAID use  Denies heartburn, regurgitation, dysphagia, odynophagia, vomiting, constipation, weight loss  No prior EGD or colonoscopy  Her diet is mostly consists of carbs  Family history significant for Crohn's disease in her brother and a maternal aunt with Celiac disease  REVIEW OF SYSTEMS:  10 point ROS reviewed and negative except otherwise noted in the HPI above       Historical Information   Patient Active Problem List   Diagnosis   • Need for meningitis vaccination   • Encounter for Depo-Provera contraception   • Encounter for well child visit at 18years of age   • Pilonidal abscess   • Encounter for well adolescent visit   • Herpes   • Chronic left shoulder pain   • Costochondritis   • Cervical spine pain   • Back pain   • Secondary amenorrhea   • Body aches   • Positive VIDAL (antinuclear antibody)   • Hyperthyroidism   • Amenorrhea   • Blood in stool     Past Medical History:   Diagnosis Date   • Pilonidal cyst    • Wears glasses      Past Surgical History:   Procedure Laterality Date   • INCISION AND DRAINAGE ABSCESS ANAL      Pilonidal Cyst  4 times-cut & drained in office-reoccurred "  • [place] EXCISION PILONIDAL CYST/SINUS COMPLICATED N/A 7/85/1282    Procedure: EXCISION PILONIDAL CYST;  Surgeon: Radha Pittman MD;  [facility];  Service: General   • CO EXCISION PILONIDAL CYST/SINUS SIMPLE N/A 4/5/2018    Procedure: EXCISION PILONIDAL CYST and debriding;  Surgeon: Radha Pittman MD;  [facility];  Service: General     Social History   Social History     Substance and Sexual Activity   Alcohol Use Not Currently    Comment: socially     Social History     Substance and Sexual Activity   Drug Use No     Social History     Tobacco Use   Smoking Status Never   Smokeless Tobacco Never     Family History   Problem Relation Age of Onset   • No Known Problems Mother    • No Known Problems Father    • No Known Problems Brother    • Diabetes Maternal Grandmother    • Heart disease Maternal Grandmother    • Hypertension Maternal Grandmother    • Cancer Paternal Grandmother    • Cancer Other    • Cancer Other        Meds/Allergies       Current Outpatient Medications:   •  methimazole (TAPAZOLE) 5 mg tablet  •  methocarbamol (ROBAXIN) 500 mg tablet  •  polyethylene glycol (Golytely) 4000 mL solution  •  polyethylene glycol (MIRALAX) 17 g packet  •  propranolol (INDERAL) 40 mg tablet    Allergies   Allergen Reactions   • Sulfa Antibiotics Hives           Objective     Blood pressure 118/78, temperature 99 3 °F (37 4 °C), temperature source Tympanic, height 5' 2\" (1 575 m), weight 57 9 kg (127 lb 9 6 oz), not currently breastfeeding  Body mass index is 23 34 kg/m²          PHYSICAL EXAM:    General: Well-appearing, NAD  Eyes: no conjunctival icterus or pallor  Abdominal: Soft, TTP epigastrium, non-distended  Neuro: alert and oriented  Psych: Normal affect      Lab Results:   Lab Results   Component Value Date    K 3 6 04/11/2023    CO2 26 04/11/2023     04/11/2023    BUN 9 04/11/2023    CREATININE 0 80 04/11/2023     Lab Results   Component Value Date    WBC 3 76 (L) 04/11/2023    HGB " 12 1 04/11/2023    HCT 37 6 04/11/2023    MCV 88 04/11/2023     04/11/2023     Lab Results   Component Value Date    TP 8 8 (H) 04/11/2023    AST 12 04/11/2023    ALT 19 04/11/2023      Lab Results   Component Value Date    IRON 65 04/11/2023    FERRITIN 14 04/11/2023     Lab Results   Component Value Date    HDL 54 04/11/2023    TRIG 48 04/11/2023         Radiology Results:   I have reviewed the results of any radiology studies performed within the last 90 days  This includes:      CT abdomen pelvis wo contrast    Result Date: 4/18/2023  Narrative: CT ABDOMEN AND PELVIS WITHOUT IV CONTRAST INDICATION:  K92 1: Melena  COMPARISON:  CT abdomen pelvis 5/28/2019 TECHNIQUE:  CT examination of the abdomen and pelvis was performed without intravenous contrast  Multiplanar 2D reformatted images were created from the source data  Radiation dose length product (DLP) for this visit:  491 27 mGy-cm  This examination, like all CT scans performed in the [facility], was performed utilizing techniques to minimize radiation dose exposure, including the use of iterative reconstruction and automated exposure control  Enteric contrast was administered  FINDINGS: *Please note the study is suboptimally tailored to assess for evidence of GI bleeding with positive oral contrast material and absence of intravenous contrast  ABDOMEN Evaluation of the solid organs is limited without IV contrast  LOWER CHEST:  No clinically significant abnormality identified in the visualized lower chest  LIVER/BILIARY TREE:  Unremarkable  GALLBLADDER: Contracted  No calcified gallstones  No pericholecystic inflammatory change  SPLEEN:  Unremarkable  PANCREAS:  Unremarkable  ADRENAL GLANDS:  Unremarkable  KIDNEYS/URETERS:  Unremarkable  No hydronephrosis  STOMACH AND BOWEL:  Mild to moderate debris within the stomach, otherwise unremarkable  The small bowel is normal caliber   No focal inflammatory changes or fluid collections are identified  APPENDIX:  No findings to suggest appendicitis  ABDOMINOPELVIC CAVITY:  No ascites  No pneumoperitoneum  Evaluation of lymphadenopathy is limited in the absence of intravenous contrast  No bulky adenopathy detected on this noncontrast study  VESSELS:  Unremarkable for patient's age  PELVIS REPRODUCTIVE ORGANS:  Unremarkable for patient's age  URINARY BLADDER:  Unremarkable  ABDOMINAL WALL/INGUINAL REGIONS:  Tiny fat-containing umbilical hernia  OSSEOUS STRUCTURES:  No acute fracture or destructive osseous lesion  Impression: No acute intra-abdominal abnormality or discernible findings to account for melena within limitations of this exam  Workstation performed: [id number]     XR spine cervical complete 4 or 5 vw non injury    Result Date: 4/22/2023  Narrative: CERVICAL SPINE INDICATION:   M54 50: Low back pain, unspecified G89 29: Other chronic pain  COMPARISON:  C-spine x-ray 8/30/2022 VIEWS:  XR SPINE CERVICAL COMPLETE 4 OR 5 VW NON INJURY Images: 5 FINDINGS: No fracture  Straightening of the cervical lordosis may be related to patient positioning or muscle spasm  The intervertebral disc spaces are preserved  The neuroforamina are patent  The prevertebral soft tissues are within normal limits  The lung apices are clear  Impression: No acute osseous abnormalities  Workstation performed: [id number]     XR follow up    Result Date: 4/22/2023  Narrative: ABDOMEN INDICATION:   M54 50: Low back pain, unspecified G89 29: Other chronic pain  COMPARISON:  None VIEWS:  AP supine FINDINGS: Contrast in the left hemicolon  There is a nonobstructive bowel gas pattern  No discernible free air on this supine study  Upright or left lateral decubitus imaging is more sensitive to detect subtle free air in the appropriate setting  No pathologic calcifications or soft tissue masses  Visualized lung bases are clear  Mild levoscoliosis  Impression: Mild levoscoliosis of lumbar spine   Workstation performed: [id number]         MILI Lake    Gastroenterology Fellow  PGY-4  Available on [employer]  4/26/2023 1:28 PM

## 2023-04-26 NOTE — PATIENT INSTRUCTIONS
Scheduled date of EGD/colonoscopy (as of today): 06/22/2023  Physician performing EGD/colonoscopy: Irish Chaudhari  Location of EGD/colonoscopy: [address]  Desired bowel prep reviewed with patient:kervin /dulcolax  Instructions reviewed with patient by:Criselda  Clearances:   N/A

## 2023-05-09 ENCOUNTER — APPOINTMENT (OUTPATIENT)
Age: 23
End: 2023-05-09

## 2023-05-09 DIAGNOSIS — Z02.1 PRE-EMPLOYMENT EXAMINATION: ICD-10-CM

## 2023-05-09 DIAGNOSIS — Z02.1 PRE-EMPLOYMENT EXAMINATION: Primary | ICD-10-CM

## 2023-05-09 LAB
MEV IGG SER QL IA: NORMAL
MUV IGG SER QL IA: NORMAL
RUBV IGG SERPL IA-ACNC: 103.8 IU/ML
VZV IGG SER QL IA: ABNORMAL

## 2023-05-11 LAB
GAMMA INTERFERON BACKGROUND BLD IA-ACNC: 0.04 IU/ML
M TB IFN-G BLD-IMP: NEGATIVE
M TB IFN-G CD4+ BCKGRND COR BLD-ACNC: 0 IU/ML
M TB IFN-G CD4+ BCKGRND COR BLD-ACNC: 0 IU/ML
MITOGEN IGNF BCKGRD COR BLD-ACNC: 3.7 IU/ML

## 2023-05-13 DIAGNOSIS — E05.90 HYPERTHYROIDISM: ICD-10-CM

## 2023-05-15 ENCOUNTER — APPOINTMENT (OUTPATIENT)
Dept: RADIOLOGY | Facility: MEDICAL CENTER | Age: 23
End: 2023-05-15

## 2023-05-15 RX ORDER — METHIMAZOLE 5 MG/1
TABLET ORAL
Qty: 180 TABLET | Refills: 1 | Status: SHIPPED | OUTPATIENT
Start: 2023-05-15

## 2023-06-03 ENCOUNTER — APPOINTMENT (OUTPATIENT)
Dept: LAB | Facility: CLINIC | Age: 23
End: 2023-06-03
Payer: COMMERCIAL

## 2023-06-03 DIAGNOSIS — E05.90 HYPERTHYROIDISM: ICD-10-CM

## 2023-06-03 LAB
T3FREE SERPL-MCNC: 3.45 PG/ML (ref 2.5–3.9)
T4 FREE SERPL-MCNC: 1.02 NG/DL (ref 0.61–1.12)
TSH SERPL DL<=0.05 MIU/L-ACNC: 0.66 UIU/ML (ref 0.45–4.5)

## 2023-06-03 PROCEDURE — 36415 COLL VENOUS BLD VENIPUNCTURE: CPT

## 2023-06-03 PROCEDURE — 84439 ASSAY OF FREE THYROXINE: CPT

## 2023-06-03 PROCEDURE — 84481 FREE ASSAY (FT-3): CPT

## 2023-06-03 PROCEDURE — 84443 ASSAY THYROID STIM HORMONE: CPT

## 2023-06-05 DIAGNOSIS — E05.90 HYPERTHYROIDISM: Primary | ICD-10-CM

## 2023-06-13 ENCOUNTER — OFFICE VISIT (OUTPATIENT)
Dept: FAMILY MEDICINE CLINIC | Facility: CLINIC | Age: 23
End: 2023-06-13
Payer: COMMERCIAL

## 2023-06-13 VITALS
RESPIRATION RATE: 16 BRPM | BODY MASS INDEX: 23.37 KG/M2 | WEIGHT: 127 LBS | HEART RATE: 100 BPM | SYSTOLIC BLOOD PRESSURE: 112 MMHG | TEMPERATURE: 97 F | OXYGEN SATURATION: 98 % | DIASTOLIC BLOOD PRESSURE: 78 MMHG | HEIGHT: 62 IN

## 2023-06-13 DIAGNOSIS — E05.90 HYPERTHYROIDISM: ICD-10-CM

## 2023-06-13 DIAGNOSIS — M94.0 COSTOCHONDRITIS: ICD-10-CM

## 2023-06-13 DIAGNOSIS — R00.0 TACHYCARDIA: Primary | ICD-10-CM

## 2023-06-13 PROCEDURE — 99214 OFFICE O/P EST MOD 30 MIN: CPT | Performed by: INTERNAL MEDICINE

## 2023-06-13 NOTE — PROGRESS NOTES
"Assessment/Plan:           Problem List Items Addressed This Visit        Endocrine    Hyperthyroidism       Musculoskeletal and Integument    Costochondritis   Other Visit Diagnoses     Tachycardia    -  Primary    Relevant Orders    Holter monitor    Echo complete w/ contrast if indicated    Ambulatory Referral to Cardiac Electrophysiology            Subjective:      Patient ID: Federico De La Cruz is a 26 y o  female  HPI  Patient history of hyperthyroidism on methimazole is here with chest discomfort and palpitation going on for the last 2 months intermittent associated with lightheadedness  He has been seen by cardiology in the past   A stress echocardiogram was ordered which patient has not carried out  TSH free T4 T3 last week were normal   Patient is on methimazole  Last CBC was normal   She reports she had red bull drink few days ago has quit drinking red bull and coffee  Patient denies any drug or alcohol use  No family history of known structural heart problems  Blood pressure stable today  Heart rate ranging between 100-210  She reports it does go up to 130  She is a medical assistant  EKG in the office question delta wave  Holter monitor echocardiogram EP cardiology consultation  The following portions of the patient's history were reviewed and updated as appropriate: allergies, current medications, past family history, past medical history, past social history, past surgical history and problem list     Review of Systems      Objective:      /78 (BP Location: Left arm, Patient Position: Sitting, Cuff Size: Standard)   Pulse 100   Temp (!) 97 °F (36 1 °C) (Tympanic)   Resp 16   Ht 5' 2\" (1 575 m)   Wt 57 6 kg (127 lb)   SpO2 98%   BMI 23 23 kg/m²          Physical Exam  Cardiovascular:      Rate and Rhythm: Regular rhythm  Tachycardia present  Heart sounds: Normal heart sounds  No murmur heard  No gallop     Pulmonary:      Effort: Pulmonary effort is normal  No " respiratory distress  Breath sounds: Normal breath sounds  No wheezing or rales  Skin:     Coloration: Skin is not pale  Neurological:      Mental Status: She is alert

## 2023-06-21 ENCOUNTER — TELEPHONE (OUTPATIENT)
Age: 23
End: 2023-06-21

## 2023-06-22 ENCOUNTER — HOSPITAL ENCOUNTER (OUTPATIENT)
Dept: GASTROENTEROLOGY | Facility: AMBULARY SURGERY CENTER | Age: 23
Setting detail: OUTPATIENT SURGERY
End: 2023-06-22
Payer: COMMERCIAL

## 2023-06-22 ENCOUNTER — ANESTHESIA EVENT (OUTPATIENT)
Dept: GASTROENTEROLOGY | Facility: AMBULARY SURGERY CENTER | Age: 23
End: 2023-06-22

## 2023-06-22 ENCOUNTER — ANESTHESIA (OUTPATIENT)
Dept: GASTROENTEROLOGY | Facility: AMBULARY SURGERY CENTER | Age: 23
End: 2023-06-22

## 2023-06-22 VITALS
HEART RATE: 94 BPM | HEIGHT: 61 IN | BODY MASS INDEX: 22.84 KG/M2 | OXYGEN SATURATION: 100 % | DIASTOLIC BLOOD PRESSURE: 86 MMHG | RESPIRATION RATE: 18 BRPM | SYSTOLIC BLOOD PRESSURE: 117 MMHG | TEMPERATURE: 97.8 F | WEIGHT: 121 LBS

## 2023-06-22 DIAGNOSIS — R10.13 EPIGASTRIC PAIN: ICD-10-CM

## 2023-06-22 DIAGNOSIS — K92.1 BLOOD IN STOOL: ICD-10-CM

## 2023-06-22 LAB
EXT PREGNANCY TEST URINE: NEGATIVE
EXT. CONTROL: NORMAL

## 2023-06-22 PROCEDURE — 88305 TISSUE EXAM BY PATHOLOGIST: CPT | Performed by: PATHOLOGY

## 2023-06-22 PROCEDURE — 88342 IMHCHEM/IMCYTCHM 1ST ANTB: CPT | Performed by: PATHOLOGY

## 2023-06-22 PROCEDURE — 81025 URINE PREGNANCY TEST: CPT | Performed by: INTERNAL MEDICINE

## 2023-06-22 RX ORDER — SODIUM CHLORIDE, SODIUM LACTATE, POTASSIUM CHLORIDE, CALCIUM CHLORIDE 600; 310; 30; 20 MG/100ML; MG/100ML; MG/100ML; MG/100ML
INJECTION, SOLUTION INTRAVENOUS CONTINUOUS PRN
Status: DISCONTINUED | OUTPATIENT
Start: 2023-06-22 | End: 2023-06-22

## 2023-06-22 RX ORDER — LIDOCAINE HYDROCHLORIDE 10 MG/ML
INJECTION, SOLUTION EPIDURAL; INFILTRATION; INTRACAUDAL; PERINEURAL AS NEEDED
Status: DISCONTINUED | OUTPATIENT
Start: 2023-06-22 | End: 2023-06-22

## 2023-06-22 RX ORDER — PROPOFOL 10 MG/ML
INJECTION, EMULSION INTRAVENOUS AS NEEDED
Status: DISCONTINUED | OUTPATIENT
Start: 2023-06-22 | End: 2023-06-22

## 2023-06-22 RX ADMIN — PROPOFOL 50 MG: 10 INJECTION, EMULSION INTRAVENOUS at 11:23

## 2023-06-22 RX ADMIN — PROPOFOL 20 MG: 10 INJECTION, EMULSION INTRAVENOUS at 11:32

## 2023-06-22 RX ADMIN — PROPOFOL 20 MG: 10 INJECTION, EMULSION INTRAVENOUS at 11:34

## 2023-06-22 RX ADMIN — PROPOFOL 20 MG: 10 INJECTION, EMULSION INTRAVENOUS at 11:26

## 2023-06-22 RX ADMIN — PROPOFOL 20 MG: 10 INJECTION, EMULSION INTRAVENOUS at 11:27

## 2023-06-22 RX ADMIN — PROPOFOL 20 MG: 10 INJECTION, EMULSION INTRAVENOUS at 11:28

## 2023-06-22 RX ADMIN — PROPOFOL 150 MG: 10 INJECTION, EMULSION INTRAVENOUS at 11:21

## 2023-06-22 RX ADMIN — PROPOFOL 20 MG: 10 INJECTION, EMULSION INTRAVENOUS at 11:29

## 2023-06-22 RX ADMIN — PROPOFOL 20 MG: 10 INJECTION, EMULSION INTRAVENOUS at 11:25

## 2023-06-22 RX ADMIN — PROPOFOL 20 MG: 10 INJECTION, EMULSION INTRAVENOUS at 11:31

## 2023-06-22 RX ADMIN — LIDOCAINE HYDROCHLORIDE 50 MG: 10 INJECTION, SOLUTION EPIDURAL; INFILTRATION; INTRACAUDAL; PERINEURAL at 11:21

## 2023-06-22 RX ADMIN — PROPOFOL 20 MG: 10 INJECTION, EMULSION INTRAVENOUS at 11:36

## 2023-06-22 RX ADMIN — PROPOFOL 20 MG: 10 INJECTION, EMULSION INTRAVENOUS at 11:30

## 2023-06-22 RX ADMIN — SODIUM CHLORIDE, SODIUM LACTATE, POTASSIUM CHLORIDE, AND CALCIUM CHLORIDE: .6; .31; .03; .02 INJECTION, SOLUTION INTRAVENOUS at 10:48

## 2023-06-22 NOTE — ANESTHESIA PREPROCEDURE EVALUATION
Procedure:  COLONOSCOPY  EGD    Relevant Problems   ENDO   (+) Hyperthyroidism      MUSCULOSKELETAL   (+) Back pain      NEURO/PSYCH   (+) Chronic left shoulder pain        Physical Exam    Airway    Mallampati score: I  TM Distance: >3 FB  Neck ROM: full     Dental   No notable dental hx     Cardiovascular  Cardiovascular exam normal    Pulmonary  Pulmonary exam normal     Other Findings        Anesthesia Plan  ASA Score- 2     Anesthesia Type- IV sedation with anesthesia with ASA Monitors  Additional Monitors:   Airway Plan:     Comment: Occasional rapid HR due to Thyroid (as per patient)  Plan Factors-    Chart reviewed  Existing labs reviewed  Patient summary reviewed  Patient is not a current smoker  Induction- intravenous  Postoperative Plan-     Informed Consent- Anesthetic plan and risks discussed with patient  I personally reviewed this patient with the CRNA  Discussed and agreed on the Anesthesia Plan with the CRNA  Johanna Baron

## 2023-06-22 NOTE — H&P
History and Physical - [facility]  Umm Agrawal 26 y o  female MRN: [id number]                  HPI: Umm Agrawal is a 26y o  year old female who presents for EGD/colonoscopy      REVIEW OF SYSTEMS: Per the HPI, and otherwise unremarkable  Historical Information   Past Medical History:   Diagnosis Date   • Constipation    • Hyperthyroidism    • Pilonidal cyst    • Wears glasses      Past Surgical History:   Procedure Laterality Date   • INCISION AND DRAINAGE ABSCESS ANAL      Pilonidal Cyst  4 times-cut & drained in office-reoccurred   • [place] EXCISION PILONIDAL CYST/SINUS COMPLICATED N/A 9/12/4628    Procedure: EXCISION PILONIDAL CYST;  Surgeon: Parul Macias MD;  [facility];  Service: General   • [place] EXCISION PILONIDAL CYST/SINUS SIMPLE N/A 4/5/2018    Procedure: EXCISION PILONIDAL CYST and debriding;  Surgeon: Parul Macias MD;  [facility];  Service: General     Social History   Social History     Substance and Sexual Activity   Alcohol Use Not Currently    Comment: socially     Social History     Substance and Sexual Activity   Drug Use No     Social History     Tobacco Use   Smoking Status Never   Smokeless Tobacco Never     Family History   Problem Relation Age of Onset   • No Known Problems Mother    • No Known Problems Father    • No Known Problems Brother    • Diabetes Maternal Grandmother    • Heart disease Maternal Grandmother    • Hypertension Maternal Grandmother    • Cancer Paternal Grandmother    • Cancer Other    • Cancer Other        Meds/Allergies       Current Outpatient Medications:   •  linaCLOtide (Linzess) 72 MCG CAPS  •  methimazole (TAPAZOLE) 5 mg tablet  •  propranolol (INDERAL) 40 mg tablet  •  methocarbamol (ROBAXIN) 500 mg tablet  No current facility-administered medications for this encounter      Facility-Administered Medications Ordered in Other Encounters:   •  lactated ringers infusion, , Intravenous, Continuous PRN, New Bag "at 06/22/23 1048    Allergies   Allergen Reactions   • Sulfa Antibiotics Hives       Objective     /61   Pulse 96   Temp (!) 97 1 °F (36 2 °C) (Temporal)   Resp 16   Ht 5' 1\" (1 549 m)   Wt 54 9 kg (121 lb)   SpO2 100%   BMI 22 86 kg/m²       PHYSICAL EXAM    Gen: NAD  Head: NCAT  CV: RRR  CHEST: Clear  ABD: soft, NT/ND  EXT: no edema      ASSESSMENT/PLAN:  This is a 26y o  year old female here for EGD/colonoscopy, and she is stable and optimized for her procedure          "

## 2023-06-22 NOTE — ANESTHESIA POSTPROCEDURE EVALUATION
Post-Op Assessment Note    CV Status:  Stable  Pain Score: 0    Pain management: adequate     Mental Status:  Alert and awake   Hydration Status:  Euvolemic   PONV Controlled:  Controlled   Airway Patency:  Patent      Post Op Vitals Reviewed: Yes      Staff: CRNA         No notable events documented      BP   98/65   Temp  97   Pulse 96   Resp 12   SpO2 99

## 2023-06-28 PROCEDURE — 88305 TISSUE EXAM BY PATHOLOGIST: CPT | Performed by: PATHOLOGY

## 2023-06-28 PROCEDURE — 88342 IMHCHEM/IMCYTCHM 1ST ANTB: CPT | Performed by: PATHOLOGY

## 2023-06-29 DIAGNOSIS — E05.90 HYPERTHYROIDISM: ICD-10-CM

## 2023-06-29 RX ORDER — METHIMAZOLE 5 MG/1
TABLET ORAL
Qty: 180 TABLET | Refills: 1 | Status: SHIPPED | OUTPATIENT
Start: 2023-06-29 | End: 2023-07-06

## 2023-06-30 ENCOUNTER — TELEPHONE (OUTPATIENT)
Dept: GASTROENTEROLOGY | Facility: CLINIC | Age: 23
End: 2023-06-30

## 2023-06-30 DIAGNOSIS — E05.00 GRAVES DISEASE: ICD-10-CM

## 2023-06-30 DIAGNOSIS — A04.8 HELICOBACTER PYLORI INFECTION: Primary | ICD-10-CM

## 2023-06-30 RX ORDER — PROPRANOLOL HYDROCHLORIDE 40 MG/1
40 TABLET ORAL 2 TIMES DAILY
Qty: 180 TABLET | Refills: 1 | Status: SHIPPED | OUTPATIENT
Start: 2023-06-30

## 2023-07-05 RX ORDER — AMOXICILLIN 500 MG/1
CAPSULE ORAL
Qty: 56 CAPSULE | Refills: 0 | Status: SHIPPED | OUTPATIENT
Start: 2023-07-05 | End: 2023-07-14

## 2023-07-05 RX ORDER — CLARITHROMYCIN 500 MG/1
TABLET, COATED ORAL
Qty: 28 TABLET | Refills: 0 | Status: SHIPPED | OUTPATIENT
Start: 2023-07-05 | End: 2023-07-14

## 2023-07-05 RX ORDER — LANSOPRAZOLE 30 MG/1
CAPSULE, DELAYED RELEASE ORAL
Qty: 28 CAPSULE | Refills: 0 | Status: SHIPPED | OUTPATIENT
Start: 2023-07-05

## 2023-07-06 ENCOUNTER — OFFICE VISIT (OUTPATIENT)
Dept: ENDOCRINOLOGY | Facility: CLINIC | Age: 23
End: 2023-07-06
Payer: COMMERCIAL

## 2023-07-06 VITALS
SYSTOLIC BLOOD PRESSURE: 100 MMHG | HEART RATE: 88 BPM | WEIGHT: 129 LBS | DIASTOLIC BLOOD PRESSURE: 70 MMHG | HEIGHT: 61 IN | BODY MASS INDEX: 24.35 KG/M2

## 2023-07-06 DIAGNOSIS — E05.90 HYPERTHYROIDISM: Primary | ICD-10-CM

## 2023-07-06 PROCEDURE — 99214 OFFICE O/P EST MOD 30 MIN: CPT | Performed by: INTERNAL MEDICINE

## 2023-07-06 RX ORDER — PROPYLTHIOURACIL 50 MG/1
TABLET ORAL
Qty: 120 TABLET | Refills: 2 | Status: SHIPPED | OUTPATIENT
Start: 2023-07-06

## 2023-07-06 NOTE — PROGRESS NOTES
7/6/2023    Assessment/Plan      Diagnoses and all orders for this visit:    Hyperthyroidism  -     Comprehensive metabolic panel; Future  -     CBC and differential; Future  -     TSH, 3rd generation; Future  -     T3, free; Future  -     T4, free; Future  -     Thyroid stimulating immunoglobulin; Future  -     propylthiouracil 50 mg tablet; 2 tabs twice daily        Assessment/Plan:  Hypothyroidism: Since there are potential plans for pregnancy in the near future, suggested switching to propylthiouracil at a dose of 100 mg twice daily and repeating labs in 4-6 weeks. Explained the rationale for the change as it relates to trimester specific risk. Discussed patient should contact me at the first sign of pregnancy so we can make sure we adjust regimen as needed and monitor thyroid blood work regularly. In the interim we will be in touch with her as results are available. Follow-up in the office in about 4 months. CC: Follow-up    History of Present Illness     HPI: Kristofer Benavides is a 26y.o. year old female with history of hypothyroidism who presents for a follow-up appointment. Hypothyroidism was discovered during work-up for fatigue, palpitations, shortness of breath, heat intolerance, difficulty sleeping. She did see cardiology for chest discomfort. She was started on methimazole. Uptake and scan and antibody suggesting Graves' disease as cause of hyperthyroidism. She is currently on methimazole 10 mg daily. She continues to be a non-smoker. She presents today overall feeling okay. She does continue to note palpitations that are controlled with propranolol but states she will be going for an echocardiogram ordered by her primary care physician. She does endorse there may be plans for pregnancy in the near future and we discussed considerations in this regard to medication management. She denies any eye discomfort or vision changes. She denies smoking.   She denies any neck compressive symptoms. Review of Systems   Constitutional: Negative for fatigue. HENT: Negative for trouble swallowing and voice change. Eyes: Negative for visual disturbance. Respiratory: Negative for shortness of breath. Cardiovascular: Positive for palpitations. Negative for leg swelling. Gastrointestinal: Negative for abdominal pain, nausea and vomiting. Endocrine: Negative for polydipsia and polyuria. Musculoskeletal: Negative for arthralgias and myalgias. Skin: Negative for rash. Neurological: Negative for dizziness, tremors and weakness. Hematological: Negative for adenopathy. Psychiatric/Behavioral: Negative for agitation and confusion.        Historical Information   Past Medical History:   Diagnosis Date   • Constipation    • Hyperthyroidism    • Pilonidal cyst    • Wears glasses      Past Surgical History:   Procedure Laterality Date   • INCISION AND DRAINAGE ABSCESS ANAL      Pilonidal Cyst  4 times-cut & drained in office-reoccurred   • [place] EXCISION PILONIDAL CYST/SINUS COMPLICATED N/A 6/37/0734    Procedure: EXCISION PILONIDAL CYST;  Surgeon: Nikos Call MD;  [facility];  Service: General   • [place] EXCISION PILONIDAL CYST/SINUS SIMPLE N/A 4/5/2018    Procedure: EXCISION PILONIDAL CYST and debriding;  Surgeon: Nikos Call MD;  [facility];  Service: General     Social History   Social History     Substance and Sexual Activity   Alcohol Use Not Currently    Comment: socially     Social History     Substance and Sexual Activity   Drug Use No     Social History     Tobacco Use   Smoking Status Never   Smokeless Tobacco Never     Family History:   Family History   Problem Relation Age of Onset   • No Known Problems Mother    • No Known Problems Father    • No Known Problems Brother    • Diabetes Maternal Grandmother    • Heart disease Maternal Grandmother    • Hypertension Maternal Grandmother    • Cancer Paternal Grandmother    • Cancer Other    • Cancer Other Meds/Allergies   Current Outpatient Medications   Medication Sig Dispense Refill   • amoxicillin (AMOXIL) 500 mg capsule Take 2 capsules (1000mg) by mouth every 12 hour for 14 days 56 capsule 0   • clarithromycin (BIAXIN) 500 mg tablet Take 1 tablet by mouth every 12 hours for 14 days 28 tablet 0   • lansoprazole (PREVACID) 30 mg capsule Take 1 capsule by mouth every 12 hours for 14 days 28 capsule 0   • linaCLOtide (Linzess) 72 MCG CAPS Take 72 mcg by mouth in the morning 30 capsule 3   • propranolol (INDERAL) 40 mg tablet Take 1 tablet (40 mg total) by mouth 2 (two) times a day 180 tablet 1   • propylthiouracil 50 mg tablet 2 tabs twice daily 120 tablet 2   • methocarbamol (ROBAXIN) 500 mg tablet Take 1 tablet (500 mg total) by mouth 2 (two) times a day as needed for muscle spasms (Patient not taking: Reported on 7/6/2023) 20 tablet 0     No current facility-administered medications for this visit. Allergies   Allergen Reactions   • Sulfa Antibiotics Hives       Objective   Vitals: Blood pressure 100/70, pulse 88, height 5' 1" (1.549 m), weight 58.5 kg (129 lb), not currently breastfeeding. Invasive Devices     None                 Physical Exam  Vitals reviewed. Constitutional:       General: She is not in acute distress. Appearance: She is well-developed. She is not diaphoretic. HENT:      Head: Normocephalic and atraumatic. Eyes:      Conjunctiva/sclera: Conjunctivae normal.      Pupils: Pupils are equal, round, and reactive to light. Neck:      Thyroid: No thyromegaly. Cardiovascular:      Rate and Rhythm: Normal rate and regular rhythm. Pulmonary:      Effort: Pulmonary effort is normal. No respiratory distress. Breath sounds: Normal breath sounds. Abdominal:      General: Bowel sounds are normal.      Palpations: Abdomen is soft. Musculoskeletal:         General: Normal range of motion. Cervical back: Normal range of motion and neck supple.    Skin:     General: Skin is warm and dry. Findings: No rash. Neurological:      Mental Status: She is alert and oriented to person, place, and time. Motor: No abnormal muscle tone. Psychiatric:         Behavior: Behavior normal.         The history was obtained from the review of the chart and from the patient. Lab Results:      Component      Latest Ref Rng & Units 6/3/2023   TSH 3RD GENERATON      0.450 - 4.500 uIU/mL 0.655   T3, Free      2.50 - 3.90 pg/mL 3.45   Free T4      0.61 - 1.12 ng/dL 1.02       Future Appointments   Date Time Provider [address]   7/31/2023  9:45 AM [facility] HOLTER/EKG 1 [facility]   7/31/2023 10:00 AM [facility] ECHO 3 [facility]       Portions of the record may have been created with voice recognition software. Occasional wrong word or "sound a like" substitutions may have occurred due to the inherent limitations of voice recognition software. Read the chart carefully and recognize, using context, where substitutions have occurred.

## 2023-07-30 DIAGNOSIS — E05.90 HYPERTHYROIDISM: ICD-10-CM

## 2023-07-31 ENCOUNTER — HOSPITAL ENCOUNTER (OUTPATIENT)
Dept: NON INVASIVE DIAGNOSTICS | Facility: HOSPITAL | Age: 23
Discharge: HOME | End: 2023-07-31
Payer: COMMERCIAL

## 2023-07-31 VITALS
WEIGHT: 129 LBS | BODY MASS INDEX: 24.35 KG/M2 | SYSTOLIC BLOOD PRESSURE: 100 MMHG | HEART RATE: 79 BPM | HEIGHT: 61 IN | DIASTOLIC BLOOD PRESSURE: 70 MMHG

## 2023-07-31 DIAGNOSIS — R00.0 TACHYCARDIA: ICD-10-CM

## 2023-07-31 PROCEDURE — 93306 TTE W/DOPPLER COMPLETE: CPT

## 2023-07-31 PROCEDURE — 93225 XTRNL ECG REC<48 HRS REC: CPT

## 2023-07-31 PROCEDURE — 93226 XTRNL ECG REC<48 HR SCAN A/R: CPT

## 2023-08-01 LAB
AORTIC ROOT: 2.9 CM
AORTIC VALVE MEAN VELOCITY: 8.5 M/S
APICAL FOUR CHAMBER EJECTION FRACTION: 61 %
ASCENDING AORTA: 2.9 CM
AV AREA BY CONTINUOUS VTI: 2.7 CM2
AV AREA PEAK VELOCITY: 2.4 CM2
AV LVOT MEAN GRADIENT: 2 MMHG
AV LVOT PEAK GRADIENT: 3 MMHG
AV MEAN GRADIENT: 3 MMHG
AV PEAK GRADIENT: 6 MMHG
AV VALVE AREA: 2.7 CM2
AV VELOCITY RATIO: 0.68
DOP CALC AO PEAK VEL: 1.19 M/S
DOP CALC AO VTI: 23.63 CM
DOP CALC LVOT AREA: 3.46 CM2
DOP CALC LVOT CARDIAC INDEX: 3.17 L/MIN/M2
DOP CALC LVOT CARDIAC OUTPUT: 4.97 L/MIN
DOP CALC LVOT DIAMETER: 2.1 CM
DOP CALC LVOT PEAK VEL VTI: 18.41 CM
DOP CALC LVOT PEAK VEL: 0.81 M/S
DOP CALC LVOT STROKE INDEX: 39.5 ML/M2
DOP CALC LVOT STROKE VOLUME: 63.73 CM3
E WAVE DECELERATION TIME: 162 MS
FRACTIONAL SHORTENING: 30 % (ref 28–44)
INTERVENTRICULAR SEPTUM IN DIASTOLE (PARASTERNAL SHORT AXIS VIEW): 0.7 CM
INTERVENTRICULAR SEPTUM: 0.7 CM (ref 0.6–1.1)
LAAS-AP2: 18.3 CM2
LAAS-AP4: 19.7 CM2
LEFT ATRIUM SIZE: 3.2 CM
LEFT ATRIUM VOLUME (MOD BIPLANE): 55 ML
LEFT INTERNAL DIMENSION IN SYSTOLE: 3 CM (ref 2.1–4)
LEFT VENTRICULAR INTERNAL DIMENSION IN DIASTOLE: 4.3 CM (ref 3.5–6)
LEFT VENTRICULAR POSTERIOR WALL IN END DIASTOLE: 0.7 CM
LEFT VENTRICULAR STROKE VOLUME: 48 ML
LVSV (TEICH): 48 ML
MV E'TISSUE VEL-LAT: 20 CM/S
MV E'TISSUE VEL-SEP: 14 CM/S
MV PEAK A VEL: 0.45 M/S
MV PEAK E VEL: 89 CM/S
RIGHT ATRIUM AREA SYSTOLE A4C: 14.1 CM2
RIGHT VENTRICLE ID DIMENSION: 2.2 CM
SL CV LEFT ATRIUM LENGTH A2C: 5.3 CM
SL CV LV EF: 61
SL CV PED ECHO LEFT VENTRICLE DIASTOLIC VOLUME (MOD BIPLANE) 2D: 84 ML
SL CV PED ECHO LEFT VENTRICLE SYSTOLIC VOLUME (MOD BIPLANE) 2D: 36 ML
TR MAX PG: 24 MMHG
TR PEAK VELOCITY: 2.4 M/S
TRICUSPID ANNULAR PLANE SYSTOLIC EXCURSION: 2 CM
TRICUSPID VALVE PEAK REGURGITATION VELOCITY: 2.43 M/S

## 2023-08-01 PROCEDURE — 93306 TTE W/DOPPLER COMPLETE: CPT | Performed by: INTERNAL MEDICINE

## 2023-08-01 RX ORDER — PROPYLTHIOURACIL 50 MG/1
TABLET ORAL
Qty: 360 TABLET | Refills: 1 | OUTPATIENT
Start: 2023-08-01

## 2023-08-03 ENCOUNTER — TELEPHONE (OUTPATIENT)
Dept: OBGYN CLINIC | Facility: CLINIC | Age: 23
End: 2023-08-03

## 2023-08-03 PROCEDURE — 93227 XTRNL ECG REC<48 HR R&I: CPT | Performed by: STUDENT IN AN ORGANIZED HEALTH CARE EDUCATION/TRAINING PROGRAM

## 2023-08-03 NOTE — TELEPHONE ENCOUNTER
Called patient back. Patient wanted to schedule an apt for vaginal burning patient would be a new patient to us. She has an appointment scheduled with another office but wanted to see if we could get her in sooner. Patient aware our first available is not until September. Patient aware to can call daily to inquire about cancellations.

## 2023-08-08 ENCOUNTER — TELEPHONE (OUTPATIENT)
Dept: FAMILY MEDICINE CLINIC | Facility: CLINIC | Age: 23
End: 2023-08-08

## 2023-08-08 ENCOUNTER — HOSPITAL ENCOUNTER (EMERGENCY)
Facility: HOSPITAL | Age: 23
Discharge: HOME | End: 2023-08-08
Attending: EMERGENCY MEDICINE | Admitting: EMERGENCY MEDICINE
Payer: COMMERCIAL

## 2023-08-08 ENCOUNTER — APPOINTMENT (EMERGENCY)
Dept: RADIOLOGY | Facility: HOSPITAL | Age: 23
End: 2023-08-08
Payer: COMMERCIAL

## 2023-08-08 VITALS
TEMPERATURE: 97.6 F | RESPIRATION RATE: 16 BRPM | DIASTOLIC BLOOD PRESSURE: 57 MMHG | HEART RATE: 86 BPM | SYSTOLIC BLOOD PRESSURE: 95 MMHG | OXYGEN SATURATION: 100 %

## 2023-08-08 DIAGNOSIS — B37.31 VAGINA, CANDIDIASIS: Primary | ICD-10-CM

## 2023-08-08 DIAGNOSIS — R10.13 EPIGASTRIC PAIN: Primary | ICD-10-CM

## 2023-08-08 LAB
ALBUMIN SERPL BCP-MCNC: 3.6 G/DL (ref 3.5–5)
ALP SERPL-CCNC: 67 U/L (ref 46–116)
ALT SERPL W P-5'-P-CCNC: 18 U/L (ref 12–78)
ANION GAP SERPL CALCULATED.3IONS-SCNC: 4 MMOL/L
AST SERPL W P-5'-P-CCNC: 9 U/L (ref 5–45)
ATRIAL RATE: 0 BPM
ATRIAL RATE: 89 BPM
BASOPHILS # BLD AUTO: 0.03 THOUSANDS/ÂΜL (ref 0–0.1)
BASOPHILS NFR BLD AUTO: 1 % (ref 0–1)
BILIRUB SERPL-MCNC: 0.25 MG/DL (ref 0.2–1)
BUN SERPL-MCNC: 10 MG/DL (ref 5–25)
CALCIUM SERPL-MCNC: 9 MG/DL (ref 8.3–10.1)
CHLORIDE SERPL-SCNC: 107 MMOL/L (ref 96–108)
CO2 SERPL-SCNC: 26 MMOL/L (ref 21–32)
CREAT SERPL-MCNC: 0.74 MG/DL (ref 0.6–1.3)
D DIMER PPP FEU-MCNC: 0.35 UG/ML FEU
EOSINOPHIL # BLD AUTO: 0.06 THOUSAND/ÂΜL (ref 0–0.61)
EOSINOPHIL NFR BLD AUTO: 1 % (ref 0–6)
ERYTHROCYTE [DISTWIDTH] IN BLOOD BY AUTOMATED COUNT: 13.8 % (ref 11.6–15.1)
GFR SERPL CREATININE-BSD FRML MDRD: 116 ML/MIN/1.73SQ M
GLUCOSE SERPL-MCNC: 75 MG/DL (ref 65–140)
HCT VFR BLD AUTO: 34.9 % (ref 34.8–46.1)
HGB BLD-MCNC: 10.8 G/DL (ref 11.5–15.4)
IMM GRANULOCYTES # BLD AUTO: 0.01 THOUSAND/UL (ref 0–0.2)
IMM GRANULOCYTES NFR BLD AUTO: 0 % (ref 0–2)
LIPASE SERPL-CCNC: 111 U/L (ref 73–393)
LYMPHOCYTES # BLD AUTO: 1.31 THOUSANDS/ÂΜL (ref 0.6–4.47)
LYMPHOCYTES NFR BLD AUTO: 22 % (ref 14–44)
MCH RBC QN AUTO: 26.7 PG (ref 26.8–34.3)
MCHC RBC AUTO-ENTMCNC: 30.9 G/DL (ref 31.4–37.4)
MCV RBC AUTO: 86 FL (ref 82–98)
MONOCYTES # BLD AUTO: 0.58 THOUSAND/ÂΜL (ref 0.17–1.22)
MONOCYTES NFR BLD AUTO: 10 % (ref 4–12)
NEUTROPHILS # BLD AUTO: 3.9 THOUSANDS/ÂΜL (ref 1.85–7.62)
NEUTS SEG NFR BLD AUTO: 66 % (ref 43–75)
NRBC BLD AUTO-RTO: 0 /100 WBCS
P AXIS: 44 DEGREES
PLATELET # BLD AUTO: 257 THOUSANDS/UL (ref 149–390)
PMV BLD AUTO: 12.9 FL (ref 8.9–12.7)
POTASSIUM SERPL-SCNC: 3.5 MMOL/L (ref 3.5–5.3)
PR INTERVAL: 120 MS
PROT SERPL-MCNC: 8.2 G/DL (ref 6.4–8.4)
QRS AXIS: 0 DEGREES
QRS AXIS: 82 DEGREES
QRSD INTERVAL: 0 MS
QRSD INTERVAL: 84 MS
QT INTERVAL: 0 MS
QT INTERVAL: 340 MS
QTC INTERVAL: 0 MS
QTC INTERVAL: 413 MS
RBC # BLD AUTO: 4.04 MILLION/UL (ref 3.81–5.12)
SODIUM SERPL-SCNC: 137 MMOL/L (ref 135–147)
T WAVE AXIS: 0 DEGREES
T WAVE AXIS: 30 DEGREES
VENTRICULAR RATE: 0 BPM
VENTRICULAR RATE: 89 BPM
WBC # BLD AUTO: 5.89 THOUSAND/UL (ref 4.31–10.16)

## 2023-08-08 PROCEDURE — 93005 ELECTROCARDIOGRAM TRACING: CPT

## 2023-08-08 PROCEDURE — 85379 FIBRIN DEGRADATION QUANT: CPT

## 2023-08-08 PROCEDURE — 36415 COLL VENOUS BLD VENIPUNCTURE: CPT

## 2023-08-08 PROCEDURE — 99285 EMERGENCY DEPT VISIT HI MDM: CPT | Performed by: EMERGENCY MEDICINE

## 2023-08-08 PROCEDURE — 85025 COMPLETE CBC W/AUTO DIFF WBC: CPT

## 2023-08-08 PROCEDURE — 96374 THER/PROPH/DIAG INJ IV PUSH: CPT

## 2023-08-08 PROCEDURE — 99284 EMERGENCY DEPT VISIT MOD MDM: CPT

## 2023-08-08 PROCEDURE — 83690 ASSAY OF LIPASE: CPT

## 2023-08-08 PROCEDURE — 80053 COMPREHEN METABOLIC PANEL: CPT

## 2023-08-08 RX ORDER — SUCRALFATE 1 G/1
1 TABLET ORAL 4 TIMES DAILY
Qty: 28 TABLET | Refills: 0 | Status: SHIPPED | OUTPATIENT
Start: 2023-08-08 | End: 2023-08-09 | Stop reason: SDUPTHER

## 2023-08-08 RX ORDER — FLUCONAZOLE 150 MG/1
150 TABLET ORAL ONCE
Qty: 1 TABLET | Refills: 0 | Status: SHIPPED | OUTPATIENT
Start: 2023-08-08 | End: 2023-08-09 | Stop reason: SDUPTHER

## 2023-08-08 RX ORDER — SUCRALFATE 1 G/1
1 TABLET ORAL ONCE
Status: COMPLETED | OUTPATIENT
Start: 2023-08-08 | End: 2023-08-08

## 2023-08-08 RX ORDER — DIPHENHYDRAMINE HYDROCHLORIDE AND LIDOCAINE HYDROCHLORIDE AND ALUMINUM HYDROXIDE AND MAGNESIUM HYDRO
10 KIT ONCE
Status: COMPLETED | OUTPATIENT
Start: 2023-08-08 | End: 2023-08-08

## 2023-08-08 RX ORDER — KETOROLAC TROMETHAMINE 30 MG/ML
15 INJECTION, SOLUTION INTRAMUSCULAR; INTRAVENOUS ONCE
Status: COMPLETED | OUTPATIENT
Start: 2023-08-08 | End: 2023-08-08

## 2023-08-08 RX ORDER — AMOXICILLIN 500 MG/1
1000 CAPSULE ORAL EVERY 12 HOURS SCHEDULED
Qty: 21 CAPSULE | Refills: 0 | Status: CANCELLED | OUTPATIENT
Start: 2023-08-08 | End: 2023-08-22

## 2023-08-08 RX ORDER — OMEPRAZOLE 20 MG/1
20 CAPSULE, DELAYED RELEASE ORAL DAILY
Qty: 28 CAPSULE | Refills: 0 | Status: SHIPPED | OUTPATIENT
Start: 2023-08-08 | End: 2023-08-09 | Stop reason: SDUPTHER

## 2023-08-08 RX ORDER — CLARITHROMYCIN 500 MG/1
500 TABLET, COATED ORAL EVERY 12 HOURS SCHEDULED
Qty: 20 TABLET | Refills: 0 | Status: CANCELLED | OUTPATIENT
Start: 2023-08-08 | End: 2023-08-22

## 2023-08-08 RX ADMIN — SUCRALFATE 1 G: 1 TABLET ORAL at 13:25

## 2023-08-08 RX ADMIN — DIPHENHYDRAMINE HYDROCHLORIDE AND LIDOCAINE HYDROCHLORIDE AND ALUMINUM HYDROXIDE AND MAGNESIUM HYDRO 10 ML: KIT at 13:26

## 2023-08-08 RX ADMIN — KETOROLAC TROMETHAMINE 15 MG: 30 INJECTION, SOLUTION INTRAMUSCULAR; INTRAVENOUS at 12:51

## 2023-08-08 NOTE — TELEPHONE ENCOUNTER
Spoke with patient she states she has a yeast infection it was discussed with Joellen Lawson, she sent in diflucan for patient and patient is too follow up with obgn, patient is aware

## 2023-08-08 NOTE — ED PROVIDER NOTES
1 Two daughters only remain at the bedside. Pt is resting quietly. Resp rate 16 - 18, coarse with audible secretions. Offered to turn pt to far right side. They state she has pain on her left side. They refuse any movement stating she does not like to be awakened or moved. States they wish her to remain on her back with her head as it is turned to the far left. History  Chief Complaint   Patient presents with   • Abdominal Pain     Upper left quad pain. Pt reports trouble breathing, walking; Started last night. HPI     Gil Oconnell is a 26year old  Female PMH H. Pylori  presenting with a one-day history of LUQ pain that started yesterday evening. She assumed it was constipation, but the pain persisted into the morning. It is a sharp, constant stabbing pain in LUQ that radiates up into the chest. The pain is worse with walking, bending, inspiration. She also feels short of breath. She has not tried anything for pain and has had a normal bowel movement this morning. She denies any nausea, vomiting, fevers, chills, or dysuria. Regarding shortness of breath - pulse ox in room 100% in room air. She is not on hormonal birth control. She has a history of episodic tachycardia and has been evaluated with Holter monitor and echocardiogram which was positive only for mild MR. Per chart review, Sandee Jansen as seen by GI for abdominal bloating and pain. EGD was positive for mild gastritis and biopsy revealed H. Pylori. Colonoscopy was negative. She did not complete her triple therapy as prescribed and has not followed up with GI since diagnosis. Prior to Admission Medications   Prescriptions Last Dose Informant Patient Reported?  Taking?   fluconazole (DIFLUCAN) 150 mg tablet   No No   Sig: Take 1 tablet (150 mg total) by mouth once for 1 dose   lansoprazole (PREVACID) 30 mg capsule  Self No No   Sig: Take 1 capsule by mouth every 12 hours for 14 days   linaCLOtide (Linzess) 72 MCG CAPS  Self No No   Sig: Take 72 mcg by mouth in the morning   methocarbamol (ROBAXIN) 500 mg tablet  Self No No   Sig: Take 1 tablet (500 mg total) by mouth 2 (two) times a day as needed for muscle spasms   Patient not taking: Reported on 7/6/2023   propranolol (INDERAL) 40 mg tablet  Self No No   Sig: Take 1 tablet (40 mg total) by mouth 2 (two) times a day   propylthiouracil 50 mg tablet No No   Si tabs twice daily      Facility-Administered Medications: None       Past Medical History:   Diagnosis Date   • Constipation    • Hyperthyroidism    • Pilonidal cyst    • Wears glasses        Past Surgical History:   Procedure Laterality Date   • INCISION AND DRAINAGE ABSCESS ANAL      Pilonidal Cyst  4 times-cut & drained in office-reoccurred   • VA EXCISION PILONIDAL CYST/SINUS COMPLICATED N/A 7064    Procedure: EXCISION PILONIDAL CYST;  Surgeon: Seven Heaton MD;  Location: [facility];  Service: General   • VA EXCISION PILONIDAL CYST/SINUS SIMPLE N/A 2018    Procedure: EXCISION PILONIDAL CYST and debriding;  Surgeon: Seven Heaton MD;  Location: [facility];  Service: General       Family History   Problem Relation Age of Onset   • No Known Problems Mother    • No Known Problems Father    • No Known Problems Brother    • Diabetes Maternal Grandmother    • Heart disease Maternal Grandmother    • Hypertension Maternal Grandmother    • Cancer Paternal Grandmother    • Cancer Other    • Cancer Other      I have reviewed and agree with the history as documented. E-Cigarette/Vaping   • E-Cigarette Use Never User      E-Cigarette/Vaping Substances   • Nicotine No    • THC No    • CBD No    • Flavoring No    • Other No    • Unknown No      Social History     Tobacco Use   • Smoking status: Never   • Smokeless tobacco: Never   Vaping Use   • Vaping Use: Never used   Substance Use Topics   • Alcohol use: Not Currently     Comment: socially   • Drug use: No        Review of Systems   Constitutional: Negative for activity change, appetite change, chills, diaphoresis, fatigue and fever. Respiratory: Positive for shortness of breath. Negative for apnea, cough, choking, chest tightness, wheezing and stridor. Cardiovascular: Positive for chest pain. Negative for palpitations. Gastrointestinal: Negative for abdominal pain, anal bleeding, constipation, diarrhea, nausea and vomiting. Genitourinary: Negative for difficulty urinating. Musculoskeletal: Negative for arthralgias and back pain. Skin: Negative for color change. Neurological: Negative for dizziness, seizures, light-headedness, numbness and headaches. Physical Exam  ED Triage Vitals   Temperature Pulse Respirations Blood Pressure SpO2   08/08/23 1107 08/08/23 1107 08/08/23 1200 08/08/23 1107 08/08/23 1107   97.6 °F (36.4 °C) 86 16 137/88 100 %      Temp Source Heart Rate Source Patient Position - Orthostatic VS BP Location FiO2 (%)   08/08/23 1107 08/08/23 1107 08/08/23 1107 08/08/23 1200 --   Tympanic Monitor Lying Left arm       Pain Score       08/08/23 1107       8             Orthostatic Vital Signs  Vitals:    08/08/23 1107 08/08/23 1200 08/08/23 1230 08/08/23 1500   BP: 137/88 114/75 118/72 95/57   Pulse: 86 82 82 86   Patient Position - Orthostatic VS: Lying Lying Lying Lying       Physical Exam  Constitutional:       Appearance: She is well-developed. HENT:      Head: Normocephalic and atraumatic. Eyes:      Extraocular Movements: Extraocular movements intact. Cardiovascular:      Rate and Rhythm: Normal rate and regular rhythm. Heart sounds: Normal heart sounds. No murmur heard. No friction rub. No gallop. Pulmonary:      Effort: Pulmonary effort is normal. No respiratory distress. Breath sounds: Normal breath sounds. No wheezing or rales. Abdominal:      General: Abdomen is flat. Bowel sounds are normal.      Palpations: Abdomen is soft. There is no shifting dullness or hepatomegaly. Tenderness: There is abdominal tenderness in the epigastric area and left upper quadrant. There is no right CVA tenderness, left CVA tenderness or guarding. Negative signs include Contreras's sign. Skin:     General: Skin is warm and dry. Neurological:      Mental Status: She is alert and oriented to person, place, and time.          ED Medications  Medications   ketorolac (TORADOL) injection 15 mg (15 mg Intravenous Given 8/8/23 1251)   sucralfate (CARAFATE) tablet 1 g (1 g Oral Given 8/8/23 1325)   diphenhydramine, lidocaine, Al/Mg hydroxide, simethicone (Magic Mouthwash) oral solution 10 mL (10 mL Swish & Swallow Given 8/8/23 1326)       Diagnostic Studies  Results Reviewed     Procedure Component Value Units Date/Time    Lipase [[id number]]  (Normal) Collected: 08/08/23 1159    Lab Status: Final result Specimen: Blood from Arm, Right Updated: 08/08/23 1410     Lipase 111 u/L     Comprehensive metabolic panel [[id number]] Collected: 08/08/23 1159    Lab Status: Final result Specimen: Blood from Arm, Right Updated: 08/08/23 1341     Sodium 137 mmol/L      Potassium 3.5 mmol/L      Chloride 107 mmol/L      CO2 26 mmol/L      ANION GAP 4 mmol/L      BUN 10 mg/dL      Creatinine 0.74 mg/dL      Glucose 75 mg/dL      Calcium 9.0 mg/dL      AST 9 U/L      ALT 18 U/L      Alkaline Phosphatase 67 U/L      Total Protein 8.2 g/dL      Albumin 3.6 g/dL      Total Bilirubin 0.25 mg/dL      eGFR 116 ml/min/1.73sq m     Narrative:      [place] guidelines for Chronic Kidney Disease (CKD):   •  Stage 1 with normal or high GFR (GFR > 90 mL/min/1.73 square meters)  •  Stage 2 Mild CKD (GFR = 60-89 mL/min/1.73 square meters)  •  Stage 3A Moderate CKD (GFR = 45-59 mL/min/1.73 square meters)  •  Stage 3B Moderate CKD (GFR = 30-44 mL/min/1.73 square meters)  •  Stage 4 Severe CKD (GFR = 15-29 mL/min/1.73 square meters)  •  Stage 5 End Stage CKD (GFR <15 mL/min/1.73 square meters)  Note: GFR calculation is accurate only with a steady state creatinine    D-dimer, quantitative [[id number]]  (Normal) Collected: 08/08/23 1159    Lab Status: Final result Specimen: Blood from Arm, Right Updated: 08/08/23 1328     D-Dimer, Quant 0.35 ug/ml FEU     CBC and differential [[id number]]  (Abnormal) Collected: 08/08/23 1159    Lab Status: Final result Specimen: Blood from Arm, Right Updated: 08/08/23 1304     WBC 5.89 Thousand/uL      RBC 4.04 Million/uL      Hemoglobin 10.8 g/dL      Hematocrit 34.9 %      MCV 86 fL      MCH 26.7 pg      MCHC 30.9 g/dL      RDW 13.8 %      MPV 12.9 fL      Platelets 550 Thousands/uL      nRBC 0 /100 WBCs      Neutrophils Relative 66 %      Immat GRANS % 0 %      Lymphocytes Relative 22 %      Monocytes Relative 10 %      Eosinophils Relative 1 %      Basophils Relative 1 %      Neutrophils Absolute 3.90 Thousands/µL      Immature Grans Absolute 0.01 Thousand/uL      Lymphocytes Absolute 1.31 Thousands/µL      Monocytes Absolute 0.58 Thousand/µL      Eosinophils Absolute 0.06 Thousand/µL      Basophils Absolute 0.03 Thousands/µL                  XR chest 2 views    (Results Pending)         Procedures  ECG 12 Lead Documentation Only    Date/Time: 8/8/2023 3:15 PM    Performed by: Mckenna Vega MD  Authorized by: Mckenna Vega MD    Patient location:  ED  Previous ECG:     Previous ECG:  Compared to current    Similarity:  No change    Comparison to cardiac monitor: Yes    Interpretation:     Interpretation: normal    Rate:     ECG rate assessment: normal    Rhythm:     Rhythm: sinus rhythm    Ectopy:     Ectopy: none    QRS:     QRS axis:  Right  Conduction:     Conduction: normal    ST segments:     ST segments:  Normal  T waves:     T waves: normal            ED Course  ED Course as of 08/08/23 1519   Tue Aug 08, 2023   1306 Hemoglobin(!): 10.8  12.1 in April of this year   1340 Per chart review, patient has been seen by GI for abdominal bloating and pain. Colonoscopy negative. EGD showed mild gastritis. Biopsy positive for H. Pylori. Patient did not complete triple therapy and did not do follow-up H pylori antigen testing    1341 D-Dimer, Quant: 0.35  Wells score 0. PE ruled out   1349 TOTAL BILIRUBIN: 0.25   1349 AST: 9   1349 ALT: 18   1427 Pain improved with GI cocktail.  Discussed H. Pylori with patient and need to complete full triple therapy   1507 Patient declined CXR PERC Rule for PE    Flowsheet Row Most Recent Value   PERC Rule for PE    Age >=50 0 Filed at: 08/08/2023 1142   HR >=100 0 Filed at: 08/08/2023 1142   O2 Sat on room air < 95% 0 Filed at: 08/08/2023 1142   History of PE or DVT 0 Filed at: 08/08/2023 1142   Recent trauma or surgery 0 Filed at: 08/08/2023 1142   Hemoptysis 0 Filed at: 08/08/2023 1142   Exogenous estrogen 0 Filed at: 08/08/2023 1142   Unilateral leg swelling 0 Filed at: 08/08/2023 1142   PERC Rule for PE Results 0 Filed at: 08/08/2023 1142                        [facility]     Patient is a 26 y.o. female with PMH of tachycardia, H. Pyloria who presents to the ED with RUQ and epigastric pain. Vital signs stable. Exam positive only for RUQ and epigastric pain. History and physical exam most consistent with gastritis. However, differential diagnosis included but not limited to PE, pancreatitis, biliary colic, GERD, pleural effusion, small pneumothorax. Plan CMP, CBC, lipase, D-dimer, CXR    View ED course above for further discussion on patient workup. On review of previous records patient has untreated H. Pylori and has seen Denver Rohrer at  but has not followed up. All labs reviewed and utilized in the medical decision making process  All radiology studies independently viewed by me and interpreted by the radiologist.  I reviewed all testing with the patient. Upon re-evaluation patient improved with Carafate and magic mouthwash. Suspect symptoms are 2/2 H. Pylori gastritis or PUD.       Disposition  Final diagnoses:   Epigastric pain     Time reflects when diagnosis was documented in both MDM as applicable and the Disposition within this note     Time User Action Codes Description Comment    8/8/2023  2:28 PM Art Millan Add [R10.13] Epigastric pain       ED Disposition     ED Disposition   Discharge    Condition   Stable    Date/Time   Tue Aug 8, 2023  2:28 PM    Comment   Oli Afua discharge to home/self care.               Follow-up Information     Follow up With Specialties Details Why Contact Info    Bebo Quintana MD Internal Medicine Schedule an appointment as soon as possible for a visit in 1 day  [address] [id number]  [address] [place] [address] Gastroenterology Schedule an appointment as soon as possible for a visit in 1 day Please jose luis an appointment for follow up for H. Pylori [address]  [phone]            Patient's Medications   Discharge Prescriptions    FLUCONAZOLE (DIFLUCAN) 150 MG TABLET    Take 1 tablet (150 mg total) by mouth once for 1 dose       Start Date: 8/8/2023  End Date: 8/8/2023       Order Dose: 150 mg       Quantity: 1 tablet    Refills: 0    OMEPRAZOLE (PRILOSEC) 20 MG DELAYED RELEASE CAPSULE    Take 1 capsule (20 mg total) by mouth daily       Start Date: 8/8/2023  End Date: --       Order Dose: 20 mg       Quantity: 28 capsule    Refills: 0    SUCRALFATE (CARAFATE) 1 G TABLET    Take 1 tablet (1 g total) by mouth 4 (four) times a day       Start Date: 8/8/2023  End Date: --       Order Dose: 1 g       Quantity: 28 tablet    Refills: 0         PDMP Review     None           ED Provider  Attending physically available and evaluated Selam Nolan. I managed the patient along with the ED Attending.     Electronically Signed by         Vadim Latham MD  08/08/23 4861

## 2023-08-08 NOTE — ED ATTENDING ATTESTATION
8/8/2023  I, Renato Duane, MD, saw and evaluated the patient. I have discussed the patient with the resident/non-physician practitioner and agree with the resident's/non-physician practitioner's findings, Plan of Care, and MDM as documented in the resident's/non-physician practitioner's note, except where noted. All available labs and Radiology studies were reviewed. I was present for key portions of any procedure(s) performed by the resident/non-physician practitioner and I was immediately available to provide assistance. At this point I agree with the current assessment done in the Emergency Department. I have conducted an independent evaluation of this patient a history and physical is as follows:    Patient reports she has a prior history of being diagnosed with H. pylori after seeing GI for abdominal discomfort. The patient had an endoscopy and was diagnosed with H. pylori and was prescribed triple therapy. The patient reports she did not complete the therapy and never went back for follow-up testing. The patient reports she felt well yesterday and went to bed at approximately 8 PM.  At approximately 1 AM this morning the patient woke with left upper quadrant abdominal discomfort. The patient reported that the discomfort radiating somewhat into her lower chest.  The patient reported that the pain worsened with bending over, twisting, and with deep breath. The patient denied any shortness of breath or diaphoresis. The patient denied nausea or vomiting. The patient denies any back pain or neurologic symptoms. The patient denies any VTE risk factors. The patient denies lower extremity pain or swelling. Physical exam demonstrates a pleasant alert nontoxic female no acute distress. HEENT exam was normal.  Lungs are clear with equal breath sounds. The heart had a regular rate and rhythm. The chest was mildly tender over the left lower ribs close to the sternum.   Palpation of this area reproduce the patient's symptoms. The remainder of the chest was nontender. The abdomen had mild left upper quadrant and midepigastric tenderness without rebound or guarding. The remainder the abdomen was nontender. Skin of the chest and abdomen had no changes. Lower extremities are symmetric and nontender without edema. The back was nontender. There is no focal neurologic deficit.   ED Course         Critical Care Time  Procedures

## 2023-08-08 NOTE — DISCHARGE INSTRUCTIONS
You were evaluated for left upper quadrant and epigastric abdominal pain. Your blood work was within normal limits, and your symptoms improved with medicine to sooth your stomach. We believe your symptoms are due to persistent H. Pylori infection leading to inflammation and erosion of your stomach. Please take Protonix and Carafate at home and follow up with GI for management of H. Pylori. The Protonix and Carafate will help manage your symptoms but will not treat the underlying cause. Protonix is a similar medicine to Prilosec, which I see you have prescribed. Please take only one of these medications.

## 2023-08-09 DIAGNOSIS — B37.31 VAGINA, CANDIDIASIS: ICD-10-CM

## 2023-08-09 DIAGNOSIS — R10.13 EPIGASTRIC PAIN: ICD-10-CM

## 2023-08-09 RX ORDER — OMEPRAZOLE 20 MG/1
20 CAPSULE, DELAYED RELEASE ORAL DAILY
Qty: 28 CAPSULE | Refills: 0 | Status: SHIPPED | OUTPATIENT
Start: 2023-08-09

## 2023-08-09 RX ORDER — FLUCONAZOLE 150 MG/1
150 TABLET ORAL ONCE
Qty: 1 TABLET | Refills: 0 | Status: SHIPPED | OUTPATIENT
Start: 2023-08-09 | End: 2023-08-09

## 2023-08-09 RX ORDER — SUCRALFATE 1 G/1
1 TABLET ORAL 4 TIMES DAILY
Qty: 28 TABLET | Refills: 0 | Status: SHIPPED | OUTPATIENT
Start: 2023-08-09

## 2023-08-17 ENCOUNTER — OFFICE VISIT (OUTPATIENT)
Dept: OBGYN CLINIC | Facility: CLINIC | Age: 23
End: 2023-08-17

## 2023-08-17 VITALS — SYSTOLIC BLOOD PRESSURE: 108 MMHG | WEIGHT: 128.8 LBS | DIASTOLIC BLOOD PRESSURE: 80 MMHG | BODY MASS INDEX: 24.34 KG/M2

## 2023-08-17 DIAGNOSIS — B37.31 YEAST VAGINITIS: Primary | ICD-10-CM

## 2023-08-17 LAB
BV WHIFF TEST VAG QL: ABNORMAL
CLUE CELLS SPEC QL WET PREP: ABNORMAL
PH SMN: 4 [PH]
T VAGINALIS VAG QL WET PREP: ABNORMAL
YEAST VAG QL WET PREP: POSITIVE

## 2023-08-17 RX ORDER — FLUCONAZOLE 150 MG/1
TABLET ORAL
Qty: 2 TABLET | Refills: 0 | Status: SHIPPED | OUTPATIENT
Start: 2023-08-17 | End: 2023-08-21

## 2023-08-17 RX ORDER — NYSTATIN AND TRIAMCINOLONE ACETONIDE 100000; 1 [USP'U]/G; MG/G
OINTMENT TOPICAL 2 TIMES DAILY
Qty: 30 G | Refills: 0 | Status: SHIPPED | OUTPATIENT
Start: 2023-08-17

## 2023-08-24 ENCOUNTER — OFFICE VISIT (OUTPATIENT)
Dept: GASTROENTEROLOGY | Facility: MEDICAL CENTER | Age: 23
End: 2023-08-24
Payer: COMMERCIAL

## 2023-08-24 VITALS
WEIGHT: 130.8 LBS | HEART RATE: 88 BPM | BODY MASS INDEX: 24.71 KG/M2 | SYSTOLIC BLOOD PRESSURE: 110 MMHG | TEMPERATURE: 96.2 F | DIASTOLIC BLOOD PRESSURE: 68 MMHG

## 2023-08-24 DIAGNOSIS — R10.13 EPIGASTRIC PAIN: ICD-10-CM

## 2023-08-24 DIAGNOSIS — K29.70 GASTRITIS WITHOUT BLEEDING, UNSPECIFIED CHRONICITY, UNSPECIFIED GASTRITIS TYPE: ICD-10-CM

## 2023-08-24 DIAGNOSIS — A04.8 H. PYLORI INFECTION: Primary | ICD-10-CM

## 2023-08-24 DIAGNOSIS — K59.04 CHRONIC IDIOPATHIC CONSTIPATION: ICD-10-CM

## 2023-08-24 PROCEDURE — 99214 OFFICE O/P EST MOD 30 MIN: CPT | Performed by: STUDENT IN AN ORGANIZED HEALTH CARE EDUCATION/TRAINING PROGRAM

## 2023-08-24 RX ORDER — FAMOTIDINE 20 MG/1
20 TABLET, FILM COATED ORAL 2 TIMES DAILY
Qty: 60 TABLET | Refills: 2 | Status: SHIPPED | OUTPATIENT
Start: 2023-08-24

## 2023-08-24 NOTE — PROGRESS NOTES
[facility] - Outpatient Follow-up Note  Maximus Haq 26 y.o. female MRN: [id number]  Encounter: [id number]          ASSESSMENT AND PLAN:    24-year-old female with hyperthyroidism due to Graves' here for follow-up of epigastric pain, H. pylori, and constipation. She had an EGD and colonoscopy which was notable only for H. pylori gastritis. She was started on triple therapy with improvement in her symptoms but unfortunately discontinued this after 7 days. I am suspicious she still has persistent infection but will check an H. pylori stool antigen off of PPI to see if her 7-day treatment was sufficient. 1. H. pylori infection  2. Epigastric pain  3. Gastritis without bleeding, unspecified chronicity, unspecified gastritis type  - famotidine (PEPCID) 20 mg tablet; Take 1 tablet (20 mg total) by mouth 2 (two) times a day  Dispense: 60 tablet; Refill: 2  - H. pylori antigen, stool; Future  - If still positive, will need quad therapy given failure of triple therapy  Stop omeprazole for 2 weeks. While you are off omeprazole, take famotidine twice a day. After you have been off omeprazole for 2 weeks, bring a stool sample to the lab. 4. Chronic idiopathic constipation  Recent thyroid labs with TSH at goal.  No response to low-dose Linzess. We will try escalating this. Try taking 144 mcg (2 pills) Linzess at the same time for several days. If you do not have bowel movements, increase this to 288 mcg (4 pills) for a few days. Let me know what dose of [place] has worked and we will send in a new prescription. If the Linzess at the highest dose dose not work, let me know and we will try something else. There are no diagnoses linked to this encounter.  ______________________________________________________________________    SUBJECTIVE:      Seen 4/26/2023 for hematochezia, abdominal pain, epigastric pain, constipation. Was given trial of Linzess 72 mcg and low FODMAP diet.   EGD 6/22/2023 demonstrated H. pylori gastritis with normal esophagus and duodenum. Colonoscopy 6/22/2023 with random biopsies was unremarkable. She was prescribed amoxicillin, clarithromycin, lansoprazole for H. pylori treatment    Took triple therapy for 7 days and started feeling better so stopped medication. About a week after stopping, got severe abdominal pain again. Went to ED 8/8/23, reviewed documentation. Feels like stomach is swollen and has heartbeat. Taking omeprazole and carafate since ED visit, feels like it helps but not fully resolved. Having BM once every 3-4 days. Tried Linzess 72 mcg with no effect. REVIEW OF SYSTEMS IS OTHERWISE NEGATIVE.       Historical Information   Past Medical History:   Diagnosis Date   • Constipation    • Hyperthyroidism    • Pilonidal cyst    • Wears glasses      Past Surgical History:   Procedure Laterality Date   • INCISION AND DRAINAGE ABSCESS ANAL      Pilonidal Cyst  4 times-cut & drained in office-reoccurred   • ID EXCISION PILONIDAL CYST/SINUS COMPLICATED N/A 5/01/6970    Procedure: EXCISION PILONIDAL CYST;  Surgeon: Jazmine Keller MD;  Location: [facility];  Service: General   • ID EXCISION PILONIDAL CYST/SINUS SIMPLE N/A 4/5/2018    Procedure: EXCISION PILONIDAL CYST and debriding;  Surgeon: Jazmine Keller MD;  Location: [facility];  Service: General     Social History   Social History     Substance and Sexual Activity   Alcohol Use Not Currently    Comment: socially     Social History     Substance and Sexual Activity   Drug Use No     Social History     Tobacco Use   Smoking Status Never   Smokeless Tobacco Never     Family History   Problem Relation Age of Onset   • No Known Problems Mother    • No Known Problems Father    • No Known Problems Brother    • Diabetes Maternal Grandmother    • Heart disease Maternal Grandmother    • Hypertension Maternal Grandmother    • Cancer Paternal Grandmother    • Cancer Other    • Cancer Other Meds/Allergies       Current Outpatient Medications:   •  linaCLOtide (Linzess) 72 MCG CAPS  •  nystatin-triamcinolone (MYCOLOG-II) ointment  •  omeprazole (PriLOSEC) 20 mg delayed release capsule  •  propranolol (INDERAL) 40 mg tablet  •  propylthiouracil 50 mg tablet  •  sucralfate (CARAFATE) 1 g tablet  •  lansoprazole (PREVACID) 30 mg capsule  •  methocarbamol (ROBAXIN) 500 mg tablet    Allergies   Allergen Reactions   • Sulfa Antibiotics Hives           Objective     Blood pressure 110/68, pulse 88, temperature (!) 96.2 °F (35.7 °C), temperature source Tympanic, weight 59.3 kg (130 lb 12.8 oz), last menstrual period 2023, not currently breastfeeding. Body mass index is 24.71 kg/m². PHYSICAL EXAM:      General Appearance:   Alert, cooperative, no distress   HEENT:   Normocephalic, atraumatic, anicteric. Neck:  Supple, symmetrical, trachea midline   Lungs:   Clear to auscultation bilaterally; no rales, rhonchi or wheezing; respirations unlabored    Heart[de-identified]   Regular rate and rhythm; no murmur, rub, or gallop. Abdomen:   Soft, non-tender, mildly distended and tympanic; normal bowel sounds; no masses, no organomegaly    Genitalia:   Deferred    Rectal:   Deferred    Extremities:  No cyanosis, clubbing or edema    Pulses:  2+ and symmetric    Skin:  No jaundice, rashes, or lesions    Lymph nodes:  No palpable cervical lymphadenopathy        Lab Results:   No visits with results within 1 Day(s) from this visit. Latest known visit with results is:   Office Visit on 2023   Component Date Value   • Yeast, Wet Prep 2023 Positive    • pH 2023 4    • Whiff Test 2023 Neg    • Clue Cells 2023 Neg    • Trich, Wet Prep 2023 Neg          Radiology Results:   Holter monitor    Result Date: 8/3/2023  Narrative: PT NAME: Tate Sellers : 2000  AGE: 26 y.o.   GENDER: female MRN: [id number]   PROCEDURE: Holter monitor INDICATIONS:Tachycardia DESCRIPTION OF FINDINGS: The patient was monitored for a total of 48 hours. The patient was predominantly in normal sinus rhythm throughout the study. The average heart rate was 94 beats per minute. The heart rate ranged from a low of 61 beats per minute to a maximum of 148 beats per minute. Ventricular ectopic activity consisted of 0 beats (0% of total beats). There was no sustained or nonsustained ventricular tachycardia. Supraventricular ectopic activity consisted of 5 beats (0% of total beats), of which 3 were single PACs and 2 were late beats. There was no supraventricular tachycardia identified. There was no evidence of atrial fibrillation or atrial flutter. There were no significant pauses. The longest R-R interval was 1.1 seconds. There was no evidence of advanced degree heart block. The accompanying patient diary notes symptoms of "chest pain" and "out of breath". Correlation with the tracings at these times reveals normal sinus rhythm/sinus tachycardia at 89 to 102 bpm.     Impression: The patient had predominantly sinus rhythm. Rare PAC noted, no symptoms reported. No other major arrhythmia was noted. Cardiology fellow: Estefania Strickland MD Cardiology attending: Katty Jernigan MD    Echo complete w/ contrast if indicated    Result Date: 8/1/2023  Narrative: •  Left Ventricle: Left ventricular cavity size is normal. Wall thickness is normal. The left ventricular ejection fraction is 61% by biplane measurement. . Systolic function is normal. Wall motion is normal. Diastolic function is normal. •  Right Ventricle: Right ventricular cavity size is normal. Systolic function is normal. •  Mitral Valve: There is mild regurgitation. •  Tricuspid Valve: There is mild regurgitation. Pulmonary artery systolic pressures are estimated at 27 mmHg. •  Pulmonic Valve: There is mild regurgitation. •  There is no study for comparison.

## 2023-08-24 NOTE — PATIENT INSTRUCTIONS
1) Stop omeprazole for 2 weeks. While you are off omeprazole, take famotidine twice a day. After you have been off omeprazole for 2 weeks, bring a stool sample to the lab. 2) Try taking 144 mcg (2 pills) Linzess at the same time for several days. If you do not have bowel movements, increase this to 288 mcg (4 pills) for a few days. Let me know what dose of [place] has worked and we will send in a new prescription. If the Linzess at the highest dose dose not work, let me know and we will try something else.

## 2023-08-29 DIAGNOSIS — R10.13 EPIGASTRIC PAIN: ICD-10-CM

## 2023-08-29 RX ORDER — OMEPRAZOLE 20 MG/1
20 CAPSULE, DELAYED RELEASE ORAL DAILY
Qty: 84 CAPSULE | Refills: 1 | OUTPATIENT
Start: 2023-08-29

## 2023-09-08 DIAGNOSIS — E05.90 HYPERTHYROIDISM: ICD-10-CM

## 2023-09-09 ENCOUNTER — APPOINTMENT (OUTPATIENT)
Dept: LAB | Facility: CLINIC | Age: 23
End: 2023-09-09
Payer: COMMERCIAL

## 2023-09-09 DIAGNOSIS — K29.70 GASTRITIS WITHOUT BLEEDING, UNSPECIFIED CHRONICITY, UNSPECIFIED GASTRITIS TYPE: ICD-10-CM

## 2023-09-09 DIAGNOSIS — R10.13 EPIGASTRIC PAIN: ICD-10-CM

## 2023-09-09 DIAGNOSIS — E05.90 HYPERTHYROIDISM: ICD-10-CM

## 2023-09-09 DIAGNOSIS — A04.8 H. PYLORI INFECTION: ICD-10-CM

## 2023-09-09 DIAGNOSIS — R10.814 LEFT LOWER QUADRANT ABDOMINAL TENDERNESS WITHOUT REBOUND TENDERNESS: ICD-10-CM

## 2023-09-09 DIAGNOSIS — K92.1 BLOOD IN STOOL: ICD-10-CM

## 2023-09-09 LAB
ALBUMIN SERPL BCP-MCNC: 4.1 G/DL (ref 3.5–5)
ALP SERPL-CCNC: 51 U/L (ref 34–104)
ALT SERPL W P-5'-P-CCNC: 12 U/L (ref 7–52)
ANION GAP SERPL CALCULATED.3IONS-SCNC: 4 MMOL/L
AST SERPL W P-5'-P-CCNC: 13 U/L (ref 13–39)
BASOPHILS # BLD AUTO: 0.03 THOUSANDS/ÂΜL (ref 0–0.1)
BASOPHILS NFR BLD AUTO: 1 % (ref 0–1)
BILIRUB SERPL-MCNC: 0.28 MG/DL (ref 0.2–1)
BUN SERPL-MCNC: 9 MG/DL (ref 5–25)
CALCIUM SERPL-MCNC: 9.1 MG/DL (ref 8.4–10.2)
CHLORIDE SERPL-SCNC: 104 MMOL/L (ref 96–108)
CO2 SERPL-SCNC: 28 MMOL/L (ref 21–32)
CREAT SERPL-MCNC: 0.71 MG/DL (ref 0.6–1.3)
EOSINOPHIL # BLD AUTO: 0.08 THOUSAND/ÂΜL (ref 0–0.61)
EOSINOPHIL NFR BLD AUTO: 1 % (ref 0–6)
ERYTHROCYTE [DISTWIDTH] IN BLOOD BY AUTOMATED COUNT: 14.6 % (ref 11.6–15.1)
GFR SERPL CREATININE-BSD FRML MDRD: 122 ML/MIN/1.73SQ M
GLUCOSE SERPL-MCNC: 75 MG/DL (ref 65–140)
HCT VFR BLD AUTO: 35.4 % (ref 34.8–46.1)
HGB BLD-MCNC: 11 G/DL (ref 11.5–15.4)
IMM GRANULOCYTES # BLD AUTO: 0.02 THOUSAND/UL (ref 0–0.2)
IMM GRANULOCYTES NFR BLD AUTO: 0 % (ref 0–2)
LYMPHOCYTES # BLD AUTO: 1.87 THOUSANDS/ÂΜL (ref 0.6–4.47)
LYMPHOCYTES NFR BLD AUTO: 32 % (ref 14–44)
MCH RBC QN AUTO: 26.2 PG (ref 26.8–34.3)
MCHC RBC AUTO-ENTMCNC: 31.1 G/DL (ref 31.4–37.4)
MCV RBC AUTO: 84 FL (ref 82–98)
MONOCYTES # BLD AUTO: 0.38 THOUSAND/ÂΜL (ref 0.17–1.22)
MONOCYTES NFR BLD AUTO: 7 % (ref 4–12)
NEUTROPHILS # BLD AUTO: 3.39 THOUSANDS/ÂΜL (ref 1.85–7.62)
NEUTS SEG NFR BLD AUTO: 59 % (ref 43–75)
NRBC BLD AUTO-RTO: 0 /100 WBCS
PLATELET # BLD AUTO: 271 THOUSANDS/UL (ref 149–390)
PMV BLD AUTO: 13 FL (ref 8.9–12.7)
POTASSIUM SERPL-SCNC: 3.9 MMOL/L (ref 3.5–5.3)
PROT SERPL-MCNC: 7.7 G/DL (ref 6.4–8.4)
RBC # BLD AUTO: 4.2 MILLION/UL (ref 3.81–5.12)
SODIUM SERPL-SCNC: 136 MMOL/L (ref 135–147)
T3FREE SERPL-MCNC: 3.22 PG/ML (ref 2.5–3.9)
T4 FREE SERPL-MCNC: 0.77 NG/DL (ref 0.61–1.12)
TSH SERPL DL<=0.05 MIU/L-ACNC: 0.37 UIU/ML (ref 0.45–4.5)
WBC # BLD AUTO: 5.77 THOUSAND/UL (ref 4.31–10.16)

## 2023-09-09 PROCEDURE — 84439 ASSAY OF FREE THYROXINE: CPT

## 2023-09-09 PROCEDURE — 84445 ASSAY OF TSI GLOBULIN: CPT

## 2023-09-09 PROCEDURE — 80053 COMPREHEN METABOLIC PANEL: CPT

## 2023-09-09 PROCEDURE — 84443 ASSAY THYROID STIM HORMONE: CPT

## 2023-09-09 PROCEDURE — 85025 COMPLETE CBC W/AUTO DIFF WBC: CPT

## 2023-09-09 PROCEDURE — 36415 COLL VENOUS BLD VENIPUNCTURE: CPT

## 2023-09-09 PROCEDURE — 84481 FREE ASSAY (FT-3): CPT

## 2023-09-09 NOTE — TELEPHONE ENCOUNTER
Requested medication(s) are due for refill today: yes  Patient has already received a courtesy refill:yes  Other reason request has been forwarded to provider: failed protocol check

## 2023-09-10 RX ORDER — PROPYLTHIOURACIL 50 MG/1
TABLET ORAL
Qty: 360 TABLET | Refills: 1 | Status: SHIPPED | OUTPATIENT
Start: 2023-09-10 | End: 2023-09-11 | Stop reason: SDUPTHER

## 2023-09-11 DIAGNOSIS — E05.90 HYPERTHYROIDISM: ICD-10-CM

## 2023-09-11 PROBLEM — Z30.42 ENCOUNTER FOR DEPO-PROVERA CONTRACEPTION: Status: RESOLVED | Noted: 2019-07-31 | Resolved: 2023-09-11

## 2023-09-11 PROBLEM — B00.9 HERPES: Status: RESOLVED | Noted: 2021-01-06 | Resolved: 2023-09-11

## 2023-09-11 PROBLEM — Z00.129 ENCOUNTER FOR WELL CHILD VISIT AT 17 YEARS OF AGE: Status: RESOLVED | Noted: 2019-08-20 | Resolved: 2023-09-11

## 2023-09-11 PROBLEM — Z00.129 ENCOUNTER FOR WELL ADOLESCENT VISIT: Status: RESOLVED | Noted: 2021-01-06 | Resolved: 2023-09-11

## 2023-09-11 RX ORDER — PROPYLTHIOURACIL 50 MG/1
TABLET ORAL
Qty: 450 TABLET | Refills: 1 | Status: SHIPPED | OUTPATIENT
Start: 2023-09-11 | End: 2023-09-12

## 2023-09-11 NOTE — PROGRESS NOTES
Assessment/Plan:   Diann Yang is a 26 y. o.female who is here for   Chief Complaint   Patient presents with   • Pilonidal Cyst         On exam found to have pilonidal cyst not requiring I and D. Plan:  Antibiotics ordered to pharmacy to follow up next week. If need to be seen on Monday due to pain will call but otherwise will follow up next Thursday. Positioning: prone, face down    Post Op Pain Management:   Norco    - Patient has been instructed to avoid herbs or non-directed vitamins the week prior to surgery to ensure no drug interactions with perioperative surgical and anesthetic medications. - Patient should continue beta-blocker medication up through and including the day of surgery but hold any other hypertensive medications, including diuretics, unless instructed by PCP or anesthesia  - Patient should continue his statin medication up through and including the day of surgery.  - Hold metformin , If on this medication, the morning of surgery and do not resume until 48 hours AFTER surgery to avoid risk of lactic acidosis. Do not resume if eGFR is < 30  - Insulin Management:If on Insulin, patient advised to call PCP for explicit instructions. In general, will need to take one-half normal dose am of surgery but pt advised to consult PCP before making any changes. - Patient has been instructed to avoid aspirin containing medications or non-steroidal anti-inflammatory drugs for SEVEN days preceding surgery. Preoperative Clearance: None          _______________________________________________________  CC:Pilonidal Cyst  .    HPI:  Diann Yang is a 26 y. o.female who was referred for evaluation of Pilonidal Cyst  .    Currently patient reports she has a pilonidal cyst that is tender and has been slightly draining the last week. She has hsitory of pilonidal cyst excision in 2018 and then again in 2019 with long healing of her pilonidal cystectomy.   No fevers or chills but patient does have pain with sitting for over 45 minutes at a time. She was seen last in the office 10/2021 where there was some drainage at the time but chose conservative management and avoided a 3rd excision. Excision in 2019     Final Diagnosis   A. Pilonidal cyst, excision:  - Pilonidal cyst.     Excision in 2018  Final Diagnosis   A. Pilonidal cyst/sinus, excision:             - Consistent with pilonidal cyst.             - No dysplasia or malignancy is identified. ROS:  General ROS: negative  negative for - chills, fatigue, fever or night sweats, weight loss  Respiratory ROS: no cough, shortness of breath, or wheezing  Cardiovascular ROS: no chest pain or dyspnea on exertion  Genito-Urinary ROS: no dysuria, trouble voiding, or hematuria  Musculoskeletal ROS: negative for - gait disturbance, joint pain or muscle pain  Neurological ROS: no TIA or stroke symptoms  Skin ROS: See HPI  GI ROS: see HPI  Skin ROS: no new rashes or lesions   Lymphatic ROS: no new adenopathy noted by pt.    Psy ROS: no new mental or behavioral disturbances       Patient Active Problem List   Diagnosis   • Need for meningitis vaccination   • Pilonidal abscess   • Chronic left shoulder pain   • Costochondritis   • Cervical spine pain   • Back pain   • Secondary amenorrhea   • Body aches   • Positive VIDAL (antinuclear antibody)   • Hyperthyroidism   • Amenorrhea   • Blood in stool         Allergies:  Sulfa antibiotics      Current Outpatient Medications:   •  famotidine (PEPCID) 20 mg tablet, TAKE 1 TABLET BY MOUTH TWICE A DAY, Disp: 180 tablet, Rfl: 1  •  linaCLOtide (Linzess) 72 MCG CAPS, Take 72 mcg by mouth in the morning, Disp: 30 capsule, Rfl: 3  •  methimazole (TAPAZOLE) 5 mg tablet, 2 tabs daily, Disp: 180 tablet, Rfl: 0  •  nystatin-triamcinolone (MYCOLOG-II) ointment, Apply topically 2 (two) times a day As needed for itching and irritation, Disp: 30 g, Rfl: 0  •  omeprazole (PriLOSEC) 20 mg delayed release capsule, Take 1 capsule (20 mg total) by mouth daily, Disp: 28 capsule, Rfl: 0  •  propranolol (INDERAL) 40 mg tablet, Take 1 tablet (40 mg total) by mouth 2 (two) times a day, Disp: 180 tablet, Rfl: 1  •  sucralfate (CARAFATE) 1 g tablet, Take 1 tablet (1 g total) by mouth 4 (four) times a day, Disp: 28 tablet, Rfl: 0  •  lansoprazole (PREVACID) 30 mg capsule, Take 1 capsule by mouth every 12 hours for 14 days (Patient not taking: Reported on 8/17/2023), Disp: 28 capsule, Rfl: 0    Past Medical History:   Diagnosis Date   • Constipation    • Hyperthyroidism    • Pilonidal cyst    • Wears glasses        Past Surgical History:   Procedure Laterality Date   • INCISION AND DRAINAGE ABSCESS ANAL      Pilonidal Cyst  4 times-cut & drained in office-reoccurred   • [place] EXCISION PILONIDAL CYST/SINUS COMPLICATED N/A 9/72/7273    Procedure: EXCISION PILONIDAL CYST;  Surgeon: Sekou Tidwell MD;  Location: [facility];  Service: General   • RI EXCISION PILONIDAL CYST/SINUS SIMPLE N/A 4/5/2018    Procedure: EXCISION PILONIDAL CYST and debriding;  Surgeon: Sekou Tidwell MD;  [facility];  Service: General       Family History   Problem Relation Age of Onset   • No Known Problems Mother    • No Known Problems Father    • No Known Problems Brother    • Diabetes Maternal Grandmother    • Heart disease Maternal Grandmother    • Hypertension Maternal Grandmother    • Cancer Paternal Grandmother    • Cancer Other    • Cancer Other         reports that she has never smoked. She has never used smokeless tobacco. She reports that she does not currently use alcohol. She reports that she does not use drugs.     Vitals:    09/21/23 1539   BP: 134/82   Pulse: 98   Resp: 16   Temp: 97.5 °F (36.4 °C)   SpO2: 98%        PHYSICAL EXAM  General Appearance:    Alert, cooperative, no distress   Head:    Normocephalic without obvious abnormality   Eyes:    PERRL, conjunctiva/corneas clear     Neck:   Supple, no adenopathy, no JVD   Back:     Symmetric, no spinal or CVA tenderness   Lungs:     Clear to auscultation bilaterally, no wheezing or rhonchi   Heart:    Regular rate and rhythm, S1 and S2 normal, no murmur   Abdomen:        Extremities:   Extremities normal. No clubbing, cyanosis or edema   Psych:   Normal Affect, AOx3. Neurologic:  Skin:   CNII-XII intact. Strength symmetric, speech intact    Warm, dry, intact, no visible rashes or lesions except as follows: superior gluteal cleft has no erythema or tenderness. Lower superior to the rectum there is erythema and minimal drainage. No fluctuation. Tenderness present.                   Da Nesbitt PA-C    Date: 9/21/2023 Time: 3:44 PM

## 2023-09-12 DIAGNOSIS — E05.90 HYPERTHYROIDISM: ICD-10-CM

## 2023-09-12 LAB — TSI SER-ACNC: <0.1 IU/L (ref 0–0.55)

## 2023-09-12 RX ORDER — METHIMAZOLE 5 MG/1
TABLET ORAL
Qty: 180 TABLET | Refills: 0 | Status: SHIPPED | OUTPATIENT
Start: 2023-09-12

## 2023-09-16 DIAGNOSIS — K29.70 GASTRITIS WITHOUT BLEEDING, UNSPECIFIED CHRONICITY, UNSPECIFIED GASTRITIS TYPE: ICD-10-CM

## 2023-09-16 DIAGNOSIS — R10.13 EPIGASTRIC PAIN: ICD-10-CM

## 2023-09-18 RX ORDER — FAMOTIDINE 20 MG/1
20 TABLET, FILM COATED ORAL 2 TIMES DAILY
Qty: 180 TABLET | Refills: 1 | Status: SHIPPED | OUTPATIENT
Start: 2023-09-18

## 2023-09-20 ENCOUNTER — TELEPHONE (OUTPATIENT)
Dept: FAMILY MEDICINE CLINIC | Facility: CLINIC | Age: 23
End: 2023-09-20

## 2023-09-20 NOTE — TELEPHONE ENCOUNTER
"Patient is requesting for you to review her other labs she had done last week she states that she is having episode of dizziness, and \"seeing stars\".   "

## 2023-09-21 ENCOUNTER — OFFICE VISIT (OUTPATIENT)
Dept: SURGERY | Facility: CLINIC | Age: 23
End: 2023-09-21
Payer: COMMERCIAL

## 2023-09-21 VITALS
WEIGHT: 132 LBS | OXYGEN SATURATION: 98 % | DIASTOLIC BLOOD PRESSURE: 82 MMHG | BODY MASS INDEX: 24.92 KG/M2 | SYSTOLIC BLOOD PRESSURE: 134 MMHG | RESPIRATION RATE: 16 BRPM | HEART RATE: 98 BPM | HEIGHT: 61 IN | TEMPERATURE: 97.5 F

## 2023-09-21 DIAGNOSIS — L05.91 PILONIDAL CYST: Primary | ICD-10-CM

## 2023-09-21 PROCEDURE — 99213 OFFICE O/P EST LOW 20 MIN: CPT | Performed by: PHYSICIAN ASSISTANT

## 2023-09-21 RX ORDER — AMOXICILLIN AND CLAVULANATE POTASSIUM 875; 125 MG/1; MG/1
1 TABLET, FILM COATED ORAL EVERY 12 HOURS SCHEDULED
Qty: 14 TABLET | Refills: 0 | Status: SHIPPED | OUTPATIENT
Start: 2023-09-21 | End: 2023-09-28

## 2023-09-28 ENCOUNTER — OFFICE VISIT (OUTPATIENT)
Dept: SURGERY | Facility: CLINIC | Age: 23
End: 2023-09-28
Payer: COMMERCIAL

## 2023-09-28 VITALS
HEIGHT: 61 IN | DIASTOLIC BLOOD PRESSURE: 84 MMHG | BODY MASS INDEX: 24.92 KG/M2 | TEMPERATURE: 97.8 F | WEIGHT: 132 LBS | SYSTOLIC BLOOD PRESSURE: 130 MMHG | HEART RATE: 64 BPM

## 2023-09-28 DIAGNOSIS — L05.91 PILONIDAL CYST: Primary | ICD-10-CM

## 2023-09-28 PROCEDURE — 99213 OFFICE O/P EST LOW 20 MIN: CPT | Performed by: PHYSICIAN ASSISTANT

## 2023-09-28 RX ORDER — CLINDAMYCIN HYDROCHLORIDE 300 MG/1
300 CAPSULE ORAL 3 TIMES DAILY
Qty: 21 CAPSULE | Refills: 0 | Status: SHIPPED | OUTPATIENT
Start: 2023-09-28 | End: 2023-10-05

## 2023-09-28 NOTE — PROGRESS NOTES
Assessment/Plan:   Arnoldo Walton is a 26 y. o.female who is here for   Chief Complaint   Patient presents with   • Follow-up   • Pilonidal Cyst     Patient did not take antibiotics, it made her vomit. Patient believes it is still there but does not have as much pain. On exam found to have pilonidal cyst not requiring I and D. Her antibitoics prescribed made her vomit twice so she has not taken them. No worsening of pain but still not improved. Plan:  New antibiotics sent to pharmacy. Will call if she has issues with them and will follow up if no improvement. Positioning: prone, face down    Post Op Pain Management:   Norco    - Patient has been instructed to avoid herbs or non-directed vitamins the week prior to surgery to ensure no drug interactions with perioperative surgical and anesthetic medications. - Patient should continue beta-blocker medication up through and including the day of surgery but hold any other hypertensive medications, including diuretics, unless instructed by PCP or anesthesia  - Patient should continue his statin medication up through and including the day of surgery.  - Hold metformin , If on this medication, the morning of surgery and do not resume until 48 hours AFTER surgery to avoid risk of lactic acidosis. Do not resume if eGFR is < 30  - Insulin Management:If on Insulin, patient advised to call PCP for explicit instructions. In general, will need to take one-half normal dose am of surgery but pt advised to consult PCP before making any changes. - Patient has been instructed to avoid aspirin containing medications or non-steroidal anti-inflammatory drugs for SEVEN days preceding surgery. Preoperative Clearance: None          _______________________________________________________  CC: Follow-up and Pilonidal Cyst (Patient did not take antibiotics, it made her vomit. Patient believes it is still there but does not have as much pain. )  .     HPI:  Norma Burch Lili Lara is a 26 y. o.female who was referred for evaluation of Follow-up and Pilonidal Cyst (Patient did not take antibiotics, it made her vomit. Patient believes it is still there but does not have as much pain. )  . Currently patient reports she has a pilonidal cyst that is tender and has been slightly draining the last week. She has hsitory of pilonidal cyst excision in 2018 and then again in 2019 with long healing of her pilonidal cystectomy. No fevers or chills but patient does have pain with sitting for over 45 minutes at a time. She was seen last in the office 10/2021 where there was some drainage at the time but chose conservative management and avoided a 3rd excision. Excision in 2019     Final Diagnosis   A. Pilonidal cyst, excision:  - Pilonidal cyst.     Excision in 2018  Final Diagnosis   A. Pilonidal cyst/sinus, excision:             - Consistent with pilonidal cyst.             - No dysplasia or malignancy is identified. ROS:  General ROS: negative  negative for - chills, fatigue, fever or night sweats, weight loss  Respiratory ROS: no cough, shortness of breath, or wheezing  Cardiovascular ROS: no chest pain or dyspnea on exertion  Genito-Urinary ROS: no dysuria, trouble voiding, or hematuria  Musculoskeletal ROS: negative for - gait disturbance, joint pain or muscle pain  Neurological ROS: no TIA or stroke symptoms  Skin ROS: See HPI  GI ROS: see HPI  Skin ROS: no new rashes or lesions   Lymphatic ROS: no new adenopathy noted by pt.    Psy ROS: no new mental or behavioral disturbances       Patient Active Problem List   Diagnosis   • Need for meningitis vaccination   • Pilonidal abscess   • Chronic left shoulder pain   • Costochondritis   • Cervical spine pain   • Back pain   • Secondary amenorrhea   • Body aches   • Positive VIDAL (antinuclear antibody)   • Hyperthyroidism   • Amenorrhea   • Blood in stool         Allergies:  Sulfa antibiotics      Current Outpatient Medications:   • clindamycin (CLEOCIN) 300 MG capsule, Take 1 capsule (300 mg total) by mouth 3 (three) times a day for 7 days, Disp: 21 capsule, Rfl: 0  •  famotidine (PEPCID) 20 mg tablet, TAKE 1 TABLET BY MOUTH TWICE A DAY, Disp: 180 tablet, Rfl: 1  •  linaCLOtide (Linzess) 72 MCG CAPS, Take 72 mcg by mouth in the morning, Disp: 30 capsule, Rfl: 3  •  methimazole (TAPAZOLE) 5 mg tablet, 2 tabs daily, Disp: 180 tablet, Rfl: 0  •  nystatin-triamcinolone (MYCOLOG-II) ointment, Apply topically 2 (two) times a day As needed for itching and irritation, Disp: 30 g, Rfl: 0  •  omeprazole (PriLOSEC) 20 mg delayed release capsule, Take 1 capsule (20 mg total) by mouth daily, Disp: 28 capsule, Rfl: 0  •  propranolol (INDERAL) 40 mg tablet, Take 1 tablet (40 mg total) by mouth 2 (two) times a day, Disp: 180 tablet, Rfl: 1  •  sucralfate (CARAFATE) 1 g tablet, Take 1 tablet (1 g total) by mouth 4 (four) times a day, Disp: 28 tablet, Rfl: 0  •  lansoprazole (PREVACID) 30 mg capsule, Take 1 capsule by mouth every 12 hours for 14 days (Patient not taking: Reported on 8/17/2023), Disp: 28 capsule, Rfl: 0    Past Medical History:   Diagnosis Date   • Constipation    • Hyperthyroidism    • Pilonidal cyst    • Wears glasses        Past Surgical History:   Procedure Laterality Date   • INCISION AND DRAINAGE ABSCESS ANAL      Pilonidal Cyst  4 times-cut & drained in office-reoccurred   • [place] EXCISION PILONIDAL CYST/SINUS COMPLICATED N/A 5/92/9966    Procedure: EXCISION PILONIDAL CYST;  Surgeon: Sejal Erwin MD;  Location: [facility];  Service: General   • [place] EXCISION PILONIDAL CYST/SINUS SIMPLE N/A 4/5/2018    Procedure: EXCISION PILONIDAL CYST and debriding;  Surgeon: Sejal Erwin MD;  [facility];  Service: General       Family History   Problem Relation Age of Onset   • No Known Problems Mother    • No Known Problems Father    • No Known Problems Brother    • Diabetes Maternal Grandmother    • Heart disease Maternal Grandmother    • Hypertension Maternal Grandmother    • Cancer Paternal Grandmother    • Cancer Other    • Cancer Other         reports that she has never smoked. She has never used smokeless tobacco. She reports that she does not currently use alcohol. She reports that she does not use drugs. Vitals:    09/28/23 1550   BP: 130/84   Pulse: 64   Temp: 97.8 °F (36.6 °C)        PHYSICAL EXAM  General Appearance:    Alert, cooperative, no distress   Head:    Normocephalic without obvious abnormality   Eyes:    PERRL, conjunctiva/corneas clear     Neck:   Supple, no adenopathy, no JVD   Back:     Symmetric, no spinal or CVA tenderness   Lungs:     Clear to auscultation bilaterally, no wheezing or rhonchi   Heart:    Regular rate and rhythm, S1 and S2 normal, no murmur   Abdomen:        Extremities:   Extremities normal. No clubbing, cyanosis or edema   Psych:   Normal Affect, AOx3. Neurologic:  Skin:   CNII-XII intact. Strength symmetric, speech intact    Warm, dry, intact, no visible rashes or lesions except as follows: superior gluteal cleft has no erythema or tenderness. Lower superior to the rectum there is erythema and minimal drainage. No fluctuation. Tenderness present.                   Hugo Merrill PA-C    Date: 9/28/2023 Time: 3:58 PM

## 2023-10-04 DIAGNOSIS — E05.00 GRAVES DISEASE: ICD-10-CM

## 2023-10-04 RX ORDER — PROPRANOLOL HYDROCHLORIDE 40 MG/1
40 TABLET ORAL 2 TIMES DAILY
Qty: 180 TABLET | Refills: 1 | Status: SHIPPED | OUTPATIENT
Start: 2023-10-04

## 2023-10-09 ENCOUNTER — APPOINTMENT (OUTPATIENT)
Dept: LAB | Facility: MEDICAL CENTER | Age: 23
End: 2023-10-09
Payer: COMMERCIAL

## 2023-10-09 PROCEDURE — 87338 HPYLORI STOOL AG IA: CPT

## 2023-10-10 LAB — H PYLORI AG STL QL IA: NEGATIVE

## 2023-10-11 ENCOUNTER — ANNUAL EXAM (OUTPATIENT)
Dept: OBGYN CLINIC | Facility: CLINIC | Age: 23
End: 2023-10-11
Payer: COMMERCIAL

## 2023-10-11 VITALS
WEIGHT: 131.4 LBS | SYSTOLIC BLOOD PRESSURE: 118 MMHG | DIASTOLIC BLOOD PRESSURE: 74 MMHG | HEIGHT: 61 IN | BODY MASS INDEX: 24.81 KG/M2

## 2023-10-11 DIAGNOSIS — Z01.419 ROUTINE GYNECOLOGICAL EXAMINATION: Primary | ICD-10-CM

## 2023-10-11 DIAGNOSIS — N94.10 DYSPAREUNIA IN FEMALE: ICD-10-CM

## 2023-10-11 PROCEDURE — G0145 SCR C/V CYTO,THINLAYER,RESCR: HCPCS | Performed by: PHYSICIAN ASSISTANT

## 2023-10-11 PROCEDURE — 99395 PREV VISIT EST AGE 18-39: CPT | Performed by: PHYSICIAN ASSISTANT

## 2023-10-11 NOTE — PROGRESS NOTES
Patient is here for annual exam and follow up for yeast/vaginal irritation  LMP: 10/11/23   BC: Condoms   1st Pap today  Gardasil: no

## 2023-10-11 NOTE — PROGRESS NOTES
Assessment   26 y.o. Abby Turner presenting for annual exam.     Plan   Diagnoses and all orders for this visit:    Routine gynecological examination  -     Liquid-based pap, screening    Normal findings on routine exam.  Encouraged 150 min of exercise per week. Reviewed balanced diet. Multivitamin encouraged   Breast awareness/SBE encouraged     Dyspareunia in female  -     Ambulatory referral to Physical Therapy; Future  -     US pelvis complete w transvaginal; Future    Reported to be ongoing for years. Seen twice this year for vaginal infections and denied pain at that time. Exam WNL. Will check pelvic US and encouraged trial of pelvic floor PT. Discussed role of STD testing, but she declines this. She will consider and report back if sx not improving. Declines contraception initiation. Encouraged foreplay and a lubricant to avoid friction/irritation. F/u pending results of imaging and PT initiation. Pap -done today  Mammo - due @ 42    RTO one year for yearly exam or sooner as needed. __________________________________________________________________    Subjective     Kasandra Guevara is a 26 y.o. Abby Turner presenting for annual exam.     SCREENING  Last Pap: n/a - baseline done today   Last Mammo: n/a   Last Colonoscopy: 06/22/2023 - normal - no further screenings necessary     GYN    Periods are regular, monthly, lasting 7 days. Dysmenorrhea:mild, occurring premenstrually. Cyclic symptoms include bloating, breast tenderness, headache, and moodiness    Sexually active: Yes - single partner - male  Concerns: C/o dyspareunia. C/o sharp stabbing cramps about 10-15 minutes into intercourse. Felt midline. Occurs with each encounter regardless of position. Often associated with nausea. No vomiting. Sx usually severe enough that couple stops prior to completion. Pain often resolves within minutes to hour of stopping. Denies bleeding or dryness. Vagina often feels irritated/raw following IC.  They use non latex condoms for contraception. They do not use a lubricant or engage in foreplay. This has been ongoing for years but states she "doesn't like coming to doctors" and has simply been tolerating pain. Contraception: Condoms (depo caused weight gain, inconsistent with pill taking - declines initiation of alternate method)   STD testing: declines - reports testing a few months ago that was negative - same partner. Hx of STD - denies     Hx Abnormal pap: n/a - baseline done today   We reviewed ASCCP guidelines for Pap testing today. Gardasil: She has completed the Gardasil series. Denies vaginal discharge, itching, odor, dyspareunia, pelvic pain and vulvar/vaginal symptoms    OB         Complaints: denies   Denies urgency, frequency, hematuria, leakage / change in stream, difficulty urinating. BREAST  Complaints: denies   Denies: breast lump, breast tenderness, nipple discharge, skin color change, and skin lesion(s)    Pertinent Family Hx:   Family hx of breast cancer: no  Family hx of ovarian cancer: no  Family hx of colon cancer: no      GENERAL  PMH reviewed/updated and is as below.      Past Medical History:   Diagnosis Date    Constipation     Hyperthyroidism     Pilonidal cyst     Wears glasses        Past Surgical History:   Procedure Laterality Date    INCISION AND DRAINAGE ABSCESS ANAL      Pilonidal Cyst  4 times-cut & drained in office-reoccurred    [place] EXCISION PILONIDAL CYST/SINUS COMPLICATED N/A     Procedure: EXCISION PILONIDAL CYST;  Surgeon: Norval Hodgkin, MD;  [facility];  Service: General    [place] EXCISION PILONIDAL CYST/SINUS SIMPLE N/A 2018    Procedure: EXCISION PILONIDAL CYST and debriding;  Surgeon: Norval Hodgkin, MD;  [facility];  Service: General         Current Outpatient Medications:     famotidine (PEPCID) 20 mg tablet, TAKE 1 TABLET BY MOUTH TWICE A DAY, Disp: 180 tablet, Rfl: 1    linaCLOtide (Linzess) 72 MCG CAPS, Take 72 mcg by mouth in the morning, Disp: 30 capsule, Rfl: 3    methimazole (TAPAZOLE) 5 mg tablet, 2 tabs daily, Disp: 180 tablet, Rfl: 0    nystatin-triamcinolone (MYCOLOG-II) ointment, Apply topically 2 (two) times a day As needed for itching and irritation, Disp: 30 g, Rfl: 0    propranolol (INDERAL) 40 mg tablet, Take 1 tablet (40 mg total) by mouth 2 (two) times a day, Disp: 180 tablet, Rfl: 1    sucralfate (CARAFATE) 1 g tablet, Take 1 tablet (1 g total) by mouth 4 (four) times a day, Disp: 28 tablet, Rfl: 0    lansoprazole (PREVACID) 30 mg capsule, Take 1 capsule by mouth every 12 hours for 14 days (Patient not taking: Reported on 8/17/2023), Disp: 28 capsule, Rfl: 0    omeprazole (PriLOSEC) 20 mg delayed release capsule, Take 1 capsule (20 mg total) by mouth daily (Patient not taking: Reported on 10/11/2023), Disp: 28 capsule, Rfl: 0    Allergies   Allergen Reactions    Sulfa Antibiotics Hives       Social History     Socioeconomic History    Marital status: Single     Spouse name: Not on file    Number of children: Not on file    Years of education: Not on file    Highest education level: Not on file   Occupational History    Occupation: [employer]   Tobacco Use    Smoking status: Never    Smokeless tobacco: Never   Vaping Use    Vaping Use: Never used   Substance and Sexual Activity    Alcohol use: Not Currently     Comment: socially    Drug use: No    Sexual activity: Yes     Partners: Male     Birth control/protection: Condom Male   Other Topics Concern    Not on file   Social History Narrative    Not on file     Social Determinants of Health     Financial Resource Strain: Low Risk  (3/17/2023)    Overall Financial Resource Strain (CARDIA)     Difficulty of Paying Living Expenses: Not hard at all   Food Insecurity: No Food Insecurity (3/17/2023)    Hunger Vital Sign     Worried About Running Out of Food in the Last Year: Never true     Ran Out of Food in the Last Year: Never true   Transportation Needs: No Transportation Needs (3/17/2023)    PRAPARE - Transportation     Lack of Transportation (Medical): No     Lack of Transportation (Non-Medical): No   Physical Activity: Sufficiently Active (4/7/2022)    Exercise Vital Sign     Days of Exercise per Week: 3 days     Minutes of Exercise per Session: 60 min   Stress: No Stress Concern Present (4/7/2022)    [address]     Feeling of Stress : Not at all   Social Connections: Not on file   Intimate Partner Violence: Not At Risk (8/17/2022)    Humiliation, Afraid, Rape, and Kick questionnaire     Fear of Current or Ex-Partner: No     Emotionally Abused: No     Physically Abused: No     Sexually Abused: No   Housing Stability: [address]  (3/17/2023)    Housing Stability Vital Sign     Unable to Pay for Housing in the Last Year: No     Number of [address] in the Last Year: 2     Unstable Housing in the Last Year: No       Review of Systems     Constitutional: Negative. Respiratory: Negative. Cardiovascular: Negative   Gastrointestinal: Negative   Breasts: As noted above. Genitourinary: As noted above. Psychiatric: Negative     Objective      /74 (BP Location: Left arm, Patient Position: Sitting, Cuff Size: Standard)   Ht 5' 1" (1.549 m)   Wt 59.6 kg (131 lb 6.4 oz)   LMP 10/11/2023 (Exact Date)   BMI 24.83 kg/m²     Physical Examination:    Patient appears well and is not in distress  Breasts are symmetrical without mass, tenderness, nipple discharge, skin changes or adenopathy. Abdomen is soft and nontender without masses. External genitals are normal without lesions or rashes. Urethral meatus and urethra are normal  Bladder is normal to palpation  Vagina is without discharge or bleeding. Denies pain on speculum or vaginal exam. No appreciated vaginismus. Cervix is normal without discharge or lesion. Uterus mobile,without palpable mass. + midline tenderness.    Adnexa are nontender, without palpable mass.

## 2023-10-17 LAB
LAB AP GYN PRIMARY INTERPRETATION: NORMAL
Lab: NORMAL

## 2023-10-19 ENCOUNTER — OFFICE VISIT (OUTPATIENT)
Dept: GASTROENTEROLOGY | Facility: MEDICAL CENTER | Age: 23
End: 2023-10-19
Payer: COMMERCIAL

## 2023-10-19 VITALS
TEMPERATURE: 97.6 F | BODY MASS INDEX: 24.58 KG/M2 | HEIGHT: 61 IN | HEART RATE: 66 BPM | DIASTOLIC BLOOD PRESSURE: 62 MMHG | WEIGHT: 130.2 LBS | SYSTOLIC BLOOD PRESSURE: 104 MMHG

## 2023-10-19 DIAGNOSIS — K29.70 GASTRITIS WITHOUT BLEEDING, UNSPECIFIED CHRONICITY, UNSPECIFIED GASTRITIS TYPE: ICD-10-CM

## 2023-10-19 DIAGNOSIS — R14.0 BLOATING: ICD-10-CM

## 2023-10-19 DIAGNOSIS — R19.8 IRREGULAR BOWEL HABITS: ICD-10-CM

## 2023-10-19 DIAGNOSIS — R10.13 EPIGASTRIC PAIN: Primary | ICD-10-CM

## 2023-10-19 PROCEDURE — 99214 OFFICE O/P EST MOD 30 MIN: CPT | Performed by: STUDENT IN AN ORGANIZED HEALTH CARE EDUCATION/TRAINING PROGRAM

## 2023-10-19 RX ORDER — OMEPRAZOLE 40 MG/1
40 CAPSULE, DELAYED RELEASE ORAL DAILY
Qty: 30 CAPSULE | Refills: 11 | Status: SHIPPED | OUTPATIENT
Start: 2023-10-19

## 2023-10-19 RX ORDER — SUCRALFATE 1 G/1
1 TABLET ORAL 4 TIMES DAILY PRN
Qty: 120 TABLET | Refills: 5 | Status: SHIPPED | OUTPATIENT
Start: 2023-10-19

## 2023-10-19 NOTE — PROGRESS NOTES
[facility] - Outpatient Follow-up Note  An Beckwith 26 y.o. female MRN: [id number]  Encounter: [id number]          ASSESSMENT AND PLAN:    73-year-old female with hyperthyroidism due to Graves' here for follow-up of epigastric pain, H. pylori, and constipation. EGD June 2023 with H. pylori but otherwise unremarkable. Colonoscopy with random biopsies unremarkable. Discussed that her symptoms are likely a combination of acid reflux versus functional dyspepsia versus irritable bowel. 1. Epigastric pain  2. Gastritis without bleeding, unspecified chronicity, unspecified gastritis type  - sucralfate (CARAFATE) 1 g tablet; Take 1 tablet (1 g total) by mouth 4 (four) times a day as needed (abdominal pain)  Dispense: 120 tablet; Refill: 5  - omeprazole (PriLOSEC) 40 MG capsule; Take 1 capsule (40 mg total) by mouth daily  Dispense: 30 capsule; Refill: 11      3. Irregular bowel habits  4. Bloating  -Counseled on the low FODMAP diet  -Recommend she try daily powdered fiber supplement for bowel regularity      ______________________________________________________________________    SUBJECTIVE:      Last seen 8/24/2023 for H. pylori, epigastric pain, constipation. At that time she had done 7 days of triple therapy and repeat H. pylori testing 10/9/2023 demonstrated eradication. She was also given samples of Linzess to see if this would address her constipation. Still gets pain and bloating in the stomach. Pepcid helps a little bit but not that much. Carafate was helpful but had a hard time taking it 4 times a day. Has been avoiding red sauce but can't find other triggers. Everything makes it bloated but pain is primarily mediated by reflux triggers. Stool is now always loose. Having BM every other day. Not taking any laxatives but can sometimes get constipated. REVIEW OF SYSTEMS IS OTHERWISE NEGATIVE.       Historical Information   Past Medical History:   Diagnosis Date Constipation     Hyperthyroidism     Pilonidal cyst     Wears glasses      Past Surgical History:   Procedure Laterality Date    INCISION AND DRAINAGE ABSCESS ANAL      Pilonidal Cyst  4 times-cut & drained in office-reoccurred    NE EXCISION PILONIDAL CYST/SINUS COMPLICATED N/A 8/79/1024    Procedure: EXCISION PILONIDAL CYST;  Surgeon: Nydia Steele MD;  [facility];  Service: General    NE EXCISION PILONIDAL CYST/SINUS SIMPLE N/A 4/5/2018    Procedure: EXCISION PILONIDAL CYST and debriding;  Surgeon: Nydia Steele MD;  [facility];  Service: General     Social History   Social History     Substance and Sexual Activity   Alcohol Use Not Currently    Comment: socially     Social History     Substance and Sexual Activity   Drug Use No     Social History     Tobacco Use   Smoking Status Never   Smokeless Tobacco Never     Family History   Problem Relation Age of Onset    No Known Problems Mother     No Known Problems Father     Crohn's disease Brother     Diabetes Maternal Grandmother     Heart disease Maternal Grandmother     Hypertension Maternal Grandmother     Cancer Paternal Grandmother     Cancer Other     Cancer Other        Meds/Allergies       Current Outpatient Medications:     famotidine (PEPCID) 20 mg tablet    methimazole (TAPAZOLE) 5 mg tablet    propranolol (INDERAL) 40 mg tablet    lansoprazole (PREVACID) 30 mg capsule    linaCLOtide (Linzess) 72 MCG CAPS    nystatin-triamcinolone (MYCOLOG-II) ointment    omeprazole (PriLOSEC) 20 mg delayed release capsule    sucralfate (CARAFATE) 1 g tablet    Allergies   Allergen Reactions    Sulfa Antibiotics Hives           Objective   /62   Pulse 66   Temp 97.6 °F (36.4 °C) (Tympanic)   Ht 5' 1" (1.549 m)   Wt 59.1 kg (130 lb 3.2 oz)   LMP 10/11/2023 (Exact Date)   BMI 24.60 kg/m²         PHYSICAL EXAM:      General Appearance:   Alert, cooperative, no distress   HEENT:   Normocephalic, atraumatic, anicteric.      Neck: Supple, symmetrical, trachea midline   Lungs:   Clear to auscultation bilaterally; no rales, rhonchi or wheezing; respirations unlabored    Heart[de-identified]   Regular rate and rhythm; no murmur, rub, or gallop. Abdomen:   Soft, diffuse upper abdominal tenderness with no rebound or guarding, non-distended; normal bowel sounds; no masses, no organomegaly    Genitalia:   Deferred    Rectal:   Deferred    Extremities:  No cyanosis, clubbing or edema    Pulses:  2+ and symmetric    Skin:  No jaundice, rashes, or lesions    Lymph nodes:  No palpable cervical lymphadenopathy        Lab Results:   No visits with results within 1 Day(s) from this visit. Latest known visit with results is:   Annual Exam on 10/11/2023   Component Date Value    Case Report 10/11/2023                      Value:Gynecologic Cytology Report                       Case: [id number]                                  Authorizing Provider:  Sherry Ruby,     Collected:           10/11/2023 1448                                     PA-C                                                                         Ordering Location:     [facility]      Received:            10/11/2023 [facility]                                     [facility]                                                                               [facility]                                                                    First Screen:          Meridee Dakin, [place]                                                       Specimen:    LIQUID-BASED PAP, SCREENING, Cervix                                                        Primary Interpretation 10/11/2023 Negative for intraepithelial lesion or malignancy     Specimen Adequacy 10/11/2023 Satisfactory for evaluation. Endocervical/transformation zone component present. Additional Information 10/11/2023                      Value: This result contains rich text formatting which cannot be displayed here.          Radiology Results:   No results found.

## 2023-10-27 ENCOUNTER — PATIENT MESSAGE (OUTPATIENT)
Dept: OBGYN CLINIC | Facility: CLINIC | Age: 23
End: 2023-10-27

## 2023-11-18 DIAGNOSIS — R10.13 EPIGASTRIC PAIN: ICD-10-CM

## 2023-11-18 DIAGNOSIS — K29.70 GASTRITIS WITHOUT BLEEDING, UNSPECIFIED CHRONICITY, UNSPECIFIED GASTRITIS TYPE: ICD-10-CM

## 2023-11-20 RX ORDER — SUCRALFATE 1 G/1
1 TABLET ORAL 4 TIMES DAILY PRN
Qty: 360 TABLET | Refills: 1 | Status: SHIPPED | OUTPATIENT
Start: 2023-11-20

## 2023-11-20 RX ORDER — OMEPRAZOLE 40 MG/1
40 CAPSULE, DELAYED RELEASE ORAL DAILY
Qty: 90 CAPSULE | Refills: 1 | Status: SHIPPED | OUTPATIENT
Start: 2023-11-20

## 2023-11-21 DIAGNOSIS — Z30.011 ENCOUNTER FOR INITIAL PRESCRIPTION OF CONTRACEPTIVE PILLS: Primary | ICD-10-CM

## 2023-11-21 RX ORDER — NORETHINDRONE ACETATE AND ETHINYL ESTRADIOL 1; .02 MG/1; MG/1
1 TABLET ORAL DAILY
Qty: 90 TABLET | Refills: 3 | Status: SHIPPED | OUTPATIENT
Start: 2023-11-21

## 2023-11-22 ENCOUNTER — TELEPHONE (OUTPATIENT)
Dept: ENDOCRINOLOGY | Facility: CLINIC | Age: 23
End: 2023-11-22

## 2023-11-22 NOTE — TELEPHONE ENCOUNTER
I received surgical clearance form from [address] [place] [address] for patient but she is overdue for labwork which I would need to help fill out this form. Can we please remind her to update the lab work? Thanks.

## 2023-11-29 ENCOUNTER — APPOINTMENT (OUTPATIENT)
Dept: LAB | Facility: CLINIC | Age: 23
End: 2023-11-29
Payer: COMMERCIAL

## 2023-11-29 DIAGNOSIS — E05.90 HYPERTHYROIDISM: ICD-10-CM

## 2023-11-29 LAB
ALBUMIN SERPL BCP-MCNC: 4.4 G/DL (ref 3.5–5)
ALP SERPL-CCNC: 53 U/L (ref 34–104)
ALT SERPL W P-5'-P-CCNC: 12 U/L (ref 7–52)
ANION GAP SERPL CALCULATED.3IONS-SCNC: 7 MMOL/L
AST SERPL W P-5'-P-CCNC: 14 U/L (ref 13–39)
BASOPHILS # BLD AUTO: 0.01 THOUSANDS/ÂΜL (ref 0–0.1)
BASOPHILS NFR BLD AUTO: 0 % (ref 0–1)
BILIRUB SERPL-MCNC: 0.22 MG/DL (ref 0.2–1)
BUN SERPL-MCNC: 12 MG/DL (ref 5–25)
CALCIUM SERPL-MCNC: 8.9 MG/DL (ref 8.4–10.2)
CHLORIDE SERPL-SCNC: 103 MMOL/L (ref 96–108)
CO2 SERPL-SCNC: 28 MMOL/L (ref 21–32)
CREAT SERPL-MCNC: 0.68 MG/DL (ref 0.6–1.3)
EOSINOPHIL # BLD AUTO: 0 THOUSAND/ÂΜL (ref 0–0.61)
EOSINOPHIL NFR BLD AUTO: 0 % (ref 0–6)
ERYTHROCYTE [DISTWIDTH] IN BLOOD BY AUTOMATED COUNT: 14.6 % (ref 11.6–15.1)
GFR SERPL CREATININE-BSD FRML MDRD: 125 ML/MIN/1.73SQ M
GLUCOSE P FAST SERPL-MCNC: 86 MG/DL (ref 65–99)
HCT VFR BLD AUTO: 35.9 % (ref 34.8–46.1)
HGB BLD-MCNC: 11.1 G/DL (ref 11.5–15.4)
IMM GRANULOCYTES # BLD AUTO: 0.01 THOUSAND/UL (ref 0–0.2)
IMM GRANULOCYTES NFR BLD AUTO: 0 % (ref 0–2)
LYMPHOCYTES # BLD AUTO: 1.58 THOUSANDS/ÂΜL (ref 0.6–4.47)
LYMPHOCYTES NFR BLD AUTO: 34 % (ref 14–44)
MCH RBC QN AUTO: 26.6 PG (ref 26.8–34.3)
MCHC RBC AUTO-ENTMCNC: 30.9 G/DL (ref 31.4–37.4)
MCV RBC AUTO: 86 FL (ref 82–98)
MONOCYTES # BLD AUTO: 0.27 THOUSAND/ÂΜL (ref 0.17–1.22)
MONOCYTES NFR BLD AUTO: 6 % (ref 4–12)
NEUTROPHILS # BLD AUTO: 2.81 THOUSANDS/ÂΜL (ref 1.85–7.62)
NEUTS SEG NFR BLD AUTO: 60 % (ref 43–75)
NRBC BLD AUTO-RTO: 0 /100 WBCS
PLATELET # BLD AUTO: 262 THOUSANDS/UL (ref 149–390)
PMV BLD AUTO: 13.4 FL (ref 8.9–12.7)
POTASSIUM SERPL-SCNC: 3.6 MMOL/L (ref 3.5–5.3)
PROT SERPL-MCNC: 7.6 G/DL (ref 6.4–8.4)
RBC # BLD AUTO: 4.18 MILLION/UL (ref 3.81–5.12)
SODIUM SERPL-SCNC: 138 MMOL/L (ref 135–147)
T3FREE SERPL-MCNC: 3.32 PG/ML (ref 2.5–3.9)
T4 FREE SERPL-MCNC: 0.84 NG/DL (ref 0.61–1.12)
TSH SERPL DL<=0.05 MIU/L-ACNC: 0.5 UIU/ML (ref 0.45–4.5)
WBC # BLD AUTO: 4.68 THOUSAND/UL (ref 4.31–10.16)

## 2023-11-29 PROCEDURE — 84445 ASSAY OF TSI GLOBULIN: CPT

## 2023-11-29 PROCEDURE — 36415 COLL VENOUS BLD VENIPUNCTURE: CPT

## 2023-11-29 PROCEDURE — 85025 COMPLETE CBC W/AUTO DIFF WBC: CPT

## 2023-11-29 PROCEDURE — 80053 COMPREHEN METABOLIC PANEL: CPT

## 2023-11-29 PROCEDURE — 84439 ASSAY OF FREE THYROXINE: CPT

## 2023-11-29 PROCEDURE — 84481 FREE ASSAY (FT-3): CPT

## 2023-11-29 PROCEDURE — 84443 ASSAY THYROID STIM HORMONE: CPT

## 2023-11-30 DIAGNOSIS — Z23 ENCOUNTER FOR IMMUNIZATION: Primary | ICD-10-CM

## 2023-11-30 PROCEDURE — 90471 IMMUNIZATION ADMIN: CPT

## 2023-11-30 PROCEDURE — 90686 IIV4 VACC NO PRSV 0.5 ML IM: CPT

## 2023-12-01 DIAGNOSIS — E05.90 HYPERTHYROIDISM: Primary | ICD-10-CM

## 2023-12-01 LAB — TSI SER-ACNC: <0.1 IU/L (ref 0–0.55)

## 2023-12-12 DIAGNOSIS — M54.9 BACK PAIN, UNSPECIFIED BACK LOCATION, UNSPECIFIED BACK PAIN LATERALITY, UNSPECIFIED CHRONICITY: ICD-10-CM

## 2023-12-12 DIAGNOSIS — T14.8XXA PULLED MUSCLE: Primary | ICD-10-CM

## 2023-12-12 PROBLEM — N91.2 AMENORRHEA: Status: RESOLVED | Noted: 2023-01-10 | Resolved: 2023-12-12

## 2023-12-12 PROBLEM — N91.1 SECONDARY AMENORRHEA: Status: RESOLVED | Noted: 2022-01-27 | Resolved: 2023-12-12

## 2023-12-12 RX ORDER — CYCLOBENZAPRINE HCL 5 MG
5 TABLET ORAL 2 TIMES DAILY PRN
Qty: 20 TABLET | Refills: 0 | Status: SHIPPED | OUTPATIENT
Start: 2023-12-12

## 2023-12-13 ENCOUNTER — TELEPHONE (OUTPATIENT)
Dept: OBGYN CLINIC | Facility: CLINIC | Age: 23
End: 2023-12-13

## 2023-12-21 DIAGNOSIS — Z30.011 ENCOUNTER FOR INITIAL PRESCRIPTION OF CONTRACEPTIVE PILLS: Primary | ICD-10-CM

## 2023-12-21 RX ORDER — NORETHINDRONE ACETATE AND ETHINYL ESTRADIOL 1MG-20(21)
1 KIT ORAL DAILY
Qty: 28 TABLET | Refills: 5 | Status: SHIPPED | OUTPATIENT
Start: 2023-12-21

## 2023-12-24 DIAGNOSIS — E05.90 HYPERTHYROIDISM: ICD-10-CM

## 2023-12-27 RX ORDER — METHIMAZOLE 5 MG/1
TABLET ORAL
Qty: 60 TABLET | Refills: 2 | Status: SHIPPED | OUTPATIENT
Start: 2023-12-27

## 2024-01-03 ENCOUNTER — OFFICE VISIT (OUTPATIENT)
Dept: ENDOCRINOLOGY | Facility: CLINIC | Age: 24
End: 2024-01-03
Payer: COMMERCIAL

## 2024-01-03 VITALS
SYSTOLIC BLOOD PRESSURE: 110 MMHG | HEIGHT: 61 IN | WEIGHT: 131.8 LBS | DIASTOLIC BLOOD PRESSURE: 70 MMHG | BODY MASS INDEX: 24.88 KG/M2 | HEART RATE: 78 BPM

## 2024-01-03 DIAGNOSIS — R42 DIZZY: ICD-10-CM

## 2024-01-03 DIAGNOSIS — E05.90 HYPERTHYROIDISM: Primary | ICD-10-CM

## 2024-01-03 PROCEDURE — 99214 OFFICE O/P EST MOD 30 MIN: CPT

## 2024-01-03 NOTE — ASSESSMENT & PLAN NOTE
Most recent thyroid function within target range. Recommend she continue current dose of methimazole.     I do not believe her symptoms are related to her thyroid since this seems to be under good control at this time.   I have ordered blood work to r/o other possible causes.

## 2024-01-03 NOTE — PROGRESS NOTES
"Established Patient Progress Note      Chief Complaint   Patient presents with    Hyperthyroidism        Impression & Plan:    Problem List Items Addressed This Visit          Endocrine    Hyperthyroidism - Primary     Most recent thyroid function within target range. Recommend she continue current dose of methimazole.     I do not believe her symptoms are related to her thyroid since this seems to be under good control at this time.   I have ordered blood work to r/o other possible causes.             Other    Dizzy     Due to mild anemia on recent bloodwork will r/o iron deficiency for possible cause of recent dizziness.           Relevant Orders    Iron Panel (Includes Ferritin, Iron Sat%, Iron, and TIBC)       History of Present Illness:   Russ Fajardo is a 23 y.o. female with hyperthyroidism seen in follow up. Last seen in the office 7/6/23. Uptake and scan and antibody suggesting Graves' disease as cause of hyperthyroidism. She is currently taking methimazole 10 mg daily.     Her main compliant today is \"dizzy spells\" that started to occur a little over a month ago. They occur about 3 times weekly at any given time. They sometimes come on when she is just sitting down. She is also having increased shortness of breath and muscle/joint pain that has been ongoing. She works 2 jobs and is very active. She also complains of fatigue. She gets periods regularly monthly but over the last few months they have lasted 2 weeks each month. She recently started on OCP about a month ago. The dizzy spells began before starting on OCP.     Of note, her CBC does show mild anemia. She saw cardiology when first diagnosed with hyperthyroidism because of tachycardia and SOB. Cardiac work up was negative at that time. She does not have history of asthma or allergies that could be contributing to SOB.       Patient Active Problem List   Diagnosis    Need for meningitis vaccination    Pilonidal abscess    Chronic left shoulder " pain    Costochondritis    Cervical spine pain    Back pain    Body aches    Positive VIDAL (antinuclear antibody)    Hyperthyroidism    Blood in stool    Dizzy      Past Medical History:   Diagnosis Date    Constipation     Hyperthyroidism     Pilonidal cyst     Wears glasses       Past Surgical History:   Procedure Laterality Date    INCISION AND DRAINAGE ABSCESS ANAL      Pilonidal Cyst  4 times-cut & drained in office-reoccurred    ND EXCISION PILONIDAL CYST/SINUS COMPLICATED N/A 5/23/2019    Procedure: EXCISION PILONIDAL CYST;  Surgeon: Paola Chavarria MD;  [facility];  Service: General    ND EXCISION PILONIDAL CYST/SINUS SIMPLE N/A 4/5/2018    Procedure: EXCISION PILONIDAL CYST and debriding;  Surgeon: Paola Chavarria MD;  [facility];  Service: General      Family History   Problem Relation Age of Onset    No Known Problems Mother     No Known Problems Father     Crohn's disease Brother     Diabetes Maternal Grandmother     Heart disease Maternal Grandmother     Hypertension Maternal Grandmother     Cancer Paternal Grandmother     Cancer Other     Cancer Other      Social History     Tobacco Use    Smoking status: Never    Smokeless tobacco: Never   Substance Use Topics    Alcohol use: Yes     Comment: socially     Allergies   Allergen Reactions    Sulfa Antibiotics Hives         Current Outpatient Medications:     cyclobenzaprine (FLEXERIL) 5 mg tablet, Take 1 tablet (5 mg total) by mouth 2 (two) times a day as needed for muscle spasms, Disp: 20 tablet, Rfl: 0    famotidine (PEPCID) 20 mg tablet, TAKE 1 TABLET BY MOUTH TWICE A DAY, Disp: 180 tablet, Rfl: 1    methimazole (TAPAZOLE) 5 mg tablet, TAKE 2 TABLETS BY MOUTH EVERY DAY, Disp: 60 tablet, Rfl: 2    norethindrone-ethinyl estradiol (Loestrin Fe 1/20) 1-20 MG-MCG per tablet, Take 1 tablet by mouth daily, Disp: 28 tablet, Rfl: 5    nystatin-triamcinolone (MYCOLOG-II) ointment, Apply topically 2 (two) times a day As needed for itching  "and irritation, Disp: 30 g, Rfl: 0    omeprazole (PriLOSEC) 40 MG capsule, TAKE 1 CAPSULE (40 MG TOTAL) BY MOUTH DAILY., Disp: 90 capsule, Rfl: 1    propranolol (INDERAL) 40 mg tablet, Take 1 tablet (40 mg total) by mouth 2 (two) times a day, Disp: 180 tablet, Rfl: 1    sucralfate (CARAFATE) 1 g tablet, TAKE 1 TABLET (1 G TOTAL) BY MOUTH 4 (FOUR) TIMES A DAY AS NEEDED (ABDOMINAL PAIN), Disp: 360 tablet, Rfl: 1    norethindrone-ethinyl estradiol (MICROGESTIN 1/20) 1-20 MG-MCG per tablet, Take 1 tablet by mouth daily (Patient not taking: Reported on 1/3/2024), Disp: 90 tablet, Rfl: 3    Review of Systems   Constitutional:  Positive for fatigue. Negative for activity change, appetite change, chills, diaphoresis, fever and unexpected weight change.   HENT:  Negative for sore throat, trouble swallowing and voice change.    Eyes:  Negative for visual disturbance.   Respiratory:  Positive for shortness of breath.    Cardiovascular:  Negative for palpitations.   Gastrointestinal:  Negative for constipation and diarrhea.   Endocrine: Negative for cold intolerance and heat intolerance.   Skin:  Negative for rash.   Neurological:  Positive for dizziness (dizzy spells about 3 times weekly). Negative for headaches.   All other systems reviewed and are negative.      Physical Exam:  Body mass index is 24.9 kg/m².  /70   Pulse 78   Ht 5' 1\" (1.549 m)   Wt 59.8 kg (131 lb 12.8 oz)   BMI 24.90 kg/m²    Wt Readings from Last 3 Encounters:   01/03/24 59.8 kg (131 lb 12.8 oz)   10/19/23 59.1 kg (130 lb 3.2 oz)   10/11/23 59.6 kg (131 lb 6.4 oz)       Physical Exam  Vitals reviewed.   Constitutional:       Appearance: Normal appearance. She is normal weight.   Cardiovascular:      Rate and Rhythm: Normal rate and regular rhythm.      Pulses: Normal pulses.      Heart sounds: Normal heart sounds.   Pulmonary:      Effort: Pulmonary effort is normal.      Breath sounds: Normal breath sounds.   Neurological:      Mental Status: " "She is alert and oriented to person, place, and time.   Psychiatric:         Mood and Affect: Mood normal.         Thought Content: Thought content normal.         Labs:   No results found for: \"HGBA1C\"  Lab Results   Component Value Date    CREATININE 0.68 11/29/2023    CREATININE 0.71 09/09/2023    CREATININE 0.74 08/08/2023    BUN 12 11/29/2023    K 3.6 11/29/2023     11/29/2023    CO2 28 11/29/2023     eGFR   Date Value Ref Range Status   11/29/2023 125 ml/min/1.73sq m Final     Lab Results   Component Value Date    HDL 54 04/11/2023    TRIG 48 04/11/2023     Lab Results   Component Value Date    ALT 12 11/29/2023    AST 14 11/29/2023    ALKPHOS 53 11/29/2023     Lab Results   Component Value Date    TZI0WSQSUXZN 0.502 11/29/2023    CIF1SGCJJHFX 0.374 (L) 09/09/2023    EDB0TUPXLIIC 0.655 06/03/2023     Lab Results   Component Value Date    FREET4 0.84 11/29/2023       No orders of the defined types were placed in this encounter.      There are no Patient Instructions on file for this visit.    Discussed with the patient and all questioned fully answered. She will call me if any problems arise.    VIVIAN Farmer  "

## 2024-01-03 NOTE — ASSESSMENT & PLAN NOTE
Due to mild anemia on recent bloodwork will r/o iron deficiency for possible cause of recent dizziness.

## 2024-01-15 DIAGNOSIS — Z30.011 ENCOUNTER FOR INITIAL PRESCRIPTION OF CONTRACEPTIVE PILLS: ICD-10-CM

## 2024-01-15 RX ORDER — NORETHINDRONE ACETATE AND ETHINYL ESTRADIOL AND FERROUS FUMARATE 1MG-20(21)
1 KIT ORAL DAILY
Qty: 84 TABLET | Refills: 2 | Status: SHIPPED | OUTPATIENT
Start: 2024-01-15

## 2024-02-15 ENCOUNTER — TELEPHONE (OUTPATIENT)
Dept: FAMILY MEDICINE CLINIC | Facility: CLINIC | Age: 24
End: 2024-02-15

## 2024-02-15 NOTE — TELEPHONE ENCOUNTER
Patient called in with positive home COVID test, patient needs letter to excuse her from her job at [employer], letter was generated.

## 2024-02-29 ENCOUNTER — OFFICE VISIT (OUTPATIENT)
Dept: FAMILY MEDICINE CLINIC | Facility: CLINIC | Age: 24
End: 2024-02-29
Payer: COMMERCIAL

## 2024-02-29 VITALS
RESPIRATION RATE: 16 BRPM | WEIGHT: 123 LBS | SYSTOLIC BLOOD PRESSURE: 122 MMHG | BODY MASS INDEX: 23.22 KG/M2 | TEMPERATURE: 97.5 F | DIASTOLIC BLOOD PRESSURE: 78 MMHG | HEIGHT: 61 IN | OXYGEN SATURATION: 98 %

## 2024-02-29 DIAGNOSIS — R76.8 RHEUMATOID FACTOR POSITIVE: ICD-10-CM

## 2024-02-29 DIAGNOSIS — M25.50 ARTHRALGIA, UNSPECIFIED JOINT: ICD-10-CM

## 2024-02-29 DIAGNOSIS — M54.9 BACK PAIN, UNSPECIFIED BACK LOCATION, UNSPECIFIED BACK PAIN LATERALITY, UNSPECIFIED CHRONICITY: ICD-10-CM

## 2024-02-29 DIAGNOSIS — M25.60 MORNING STIFFNESS OF JOINTS: Primary | ICD-10-CM

## 2024-02-29 DIAGNOSIS — D64.9 ANEMIA, UNSPECIFIED TYPE: ICD-10-CM

## 2024-02-29 PROCEDURE — 99214 OFFICE O/P EST MOD 30 MIN: CPT | Performed by: INTERNAL MEDICINE

## 2024-02-29 RX ORDER — CYCLOBENZAPRINE HCL 5 MG
5 TABLET ORAL 2 TIMES DAILY PRN
Qty: 20 TABLET | Refills: 0 | Status: SHIPPED | OUTPATIENT
Start: 2024-02-29

## 2024-02-29 RX ORDER — SODIUM FLUORIDE 6 MG/ML
PASTE, DENTIFRICE DENTAL 2 TIMES DAILY
COMMUNITY
Start: 2024-02-22

## 2024-02-29 RX ORDER — MELOXICAM 7.5 MG/1
7.5 TABLET ORAL DAILY
Qty: 20 TABLET | Refills: 0 | Status: SHIPPED | OUTPATIENT
Start: 2024-02-29

## 2024-02-29 NOTE — PROGRESS NOTES
Assessment/Plan:           Problem List Items Addressed This Visit       Back pain    Relevant Medications    cyclobenzaprine (FLEXERIL) 5 mg tablet    meloxicam (Mobic) 7.5 mg tablet     Other Visit Diagnoses       Morning stiffness of joints    -  Primary    Relevant Orders    Ambulatory Referral to Rheumatology    C-reactive protein    Rheumatoid factor positive        Relevant Orders    Ambulatory Referral to Rheumatology    C-reactive protein    Arthralgia, unspecified joint        Relevant Orders    Ambulatory Referral to Rheumatology    C-reactive protein    Anemia, unspecified type        Relevant Orders    CBC and differential    Iron Panel (Includes Ferritin, Iron Sat%, Iron, and TIBC)    Vitamin B12         I will start her on a small dose of Mobic to be taken with food as well as muscle relaxer.  Patient will stop Mobic if she begins to have epigastric discomfort.  She is on Carafate.  Accommodation as discussed above.  Patient will follow-up with GYN.    Subjective:      Patient ID: uRss Fajardo is a 23 y.o. female.    HPI  Patient is here for upper and lower back pain that has been going on for several years but getting aggravated lately.  She is so uncomfortable that she cannot sit straight.  Denies any fevers and chills.  Neurological workup in the past was positive for rheumatoid factor being positive.  She does work in [employer] over the last 4 years and does get more aggravation when she is picking up weight more than 50 pounds.  She referred to rheumatology as well as given a work accommodation to help with salt.  X-ray of the past have not shown any inflammatory arthritis.  There was a possibility of levoscoliosis.  CT scan of the abdomen pelvis was unremarkable.  Patient had a colonoscopy which was unremarkable and GI bleed was felt to be secondary to hemorrhoids.  Mild gastritis was noted.  She will be following up with a gynecologist.  I will placing follow-up lab studies.  Patient will be  "contacting her endocrinologist as well.  She is on Tapazole. The following portions of the patient's history were reviewed and updated as appropriate: allergies, current medications, past family history, past medical history, past social history, past surgical history, and problem list.    Review of Systems      Objective:      /78 (BP Location: Left arm, Patient Position: Sitting, Cuff Size: Standard)   Temp 97.5 °F (36.4 °C) (Tympanic)   Resp 16   Ht 5' 1\" (1.549 m)   Wt 55.8 kg (123 lb)   SpO2 98%   BMI 23.24 kg/m²          Physical Exam            "

## 2024-03-11 DIAGNOSIS — R76.8 RHEUMATOID FACTOR POSITIVE: ICD-10-CM

## 2024-03-11 DIAGNOSIS — M25.60 MORNING STIFFNESS OF JOINTS: Primary | ICD-10-CM

## 2024-03-11 DIAGNOSIS — M25.50 ARTHRALGIA, UNSPECIFIED JOINT: ICD-10-CM

## 2024-03-12 NOTE — PROGRESS NOTES
Assessment/Plan:    Rheumatoid factor positive  History of widespread joint and myofascial pain which has been ongoing for several months.  She does have a history of a weak positive rheumatoid factor.  She has no signs of rheumatoid arthritis or inflammatory arthritis on exam today.  She does have a history of blood in her stool however colonoscopy was negative for any signs of IBD.  She does have a history of IBS however.  She has a history of Graves' disease with hyperthyroidism.  Given her symptoms and history we will order additional labs including workup for inflammatory arthritis and connective tissue disease.  Her symptoms are likely more myofascial in nature.  We did discuss physical therapy however she has done this in the past and did not find it to be helpful.  Encouraged continued exercise program as tolerated.  Plan to follow-up in 4 weeks or sooner if needed.    Myofascial pain  Her pain is myofascial in nature.  She does note feeling tender or sore to touch.  She is also very sensitive to weather changes.  As outlined above we will obtain additional lab work to rule out inflammatory arthritis or connective tissue disease as causes for her pain.  I have recommended adding a magnesium malate supplement which can be helpful for myofascial pain.  Encouraged continued exercise program as well.    Vitamin D deficiency  History of vitamin D deficiency.  Recommend adding over-the-counter vitamin D3 plus K2 supplement daily.  We will check vitamin D with her labs.    Positive VIDAL (antinuclear antibody)  Questionable history of positive VIDAL.  Upon review of labs available most recent VIDAL was negative however as outlined above we will order additional lab work to rule out inflammatory arthritis or connective tissue disease including [facility]ise CTD testing.    Anemia  Anemia on most recent CBC from November 2023.  She does have orders for additional lab work including iron studies and vitamin B12.  Follow-up with  PCP.       Diagnoses and all orders for this visit:    Positive VIDAL (antinuclear antibody)  -     CBC and differential; Future  -     C-reactive protein; Future  -     Comprehensive metabolic panel; Future  -     RF Screen w/ Reflex to Titer; Future  -     Cyclic citrul peptide antibody, IgG; Future  -     VIDAL Comprehensive Panel; Future  -     Lyme Ab/Western Blot Reflex; Future  -     UA (URINE) with reflex to Scope  -     TSH w/Reflex; Future  -     CK; Future  -     MISCELLANEOUS LAB TEST; Future  -     HLA-B27 antigen; Future    Morning stiffness of joints  -     Ambulatory Referral to Rheumatology    Rheumatoid factor positive  -     CBC and differential; Future  -     C-reactive protein; Future  -     Comprehensive metabolic panel; Future  -     RF Screen w/ Reflex to Titer; Future  -     Cyclic citrul peptide antibody, IgG; Future  -     VIDAL Comprehensive Panel; Future  -     Lyme Ab/Western Blot Reflex; Future  -     UA (URINE) with reflex to Scope  -     TSH w/Reflex; Future  -     CK; Future  -     MISCELLANEOUS LAB TEST; Future  -     Ambulatory Referral to Rheumatology  -     HLA-B27 antigen; Future    Arthralgia, unspecified joint  -     Ambulatory Referral to Rheumatology    Vitamin D deficiency  -     Vitamin D 25 hydroxy; Future    Myofascial pain        Spent 30 minutes total reviewing labs, chart notes, imaging studies, performing history and physical exam, discussing medication and treatment, counseling patient, and completing chart note.          Subjective:      Patient ID: Russ Fajardo is a 23 y.o. female.    She is here for new patient consultation for joint pain.  She has history of widespread joint and myofascial pain for several months.  She notes that her symptoms are particularly worse with weather changes.  She is most symptomatic in her neck, lower back, and shoulders.  She also has pain and discomfort in her knees.  She has no significant stiffness in the morning.  She does note  that her symptoms are more pronounced as the day goes on and worse at the end of the day.  She has no swelling in her joints.  She had x-rays done on 4/18/2022 of her cervical spine which was essentially unremarkable with the exception of some straightening of the cervical lordosis likely related to positioning or muscle spasm.  X-rays of her lumbar spine and sacroiliac joints done on 5/15/2023 were unremarkable as well.  She tries to exercise regularly however she does have some difficulty doing certain activities due to discomfort in her knees.  She has tried physical therapy in the past however this did not provide any significant relief for her.  Her PCP recently started her on cyclobenzaprine as well as meloxicam.  This provides some relief for her however she still has ongoing pain.    She has a history of epigastric pain along with blood in her stool.  She had an endoscopy and colonoscopy done in June 2023.  She did test positive for Helicobacter pylori and was treated with a course of antibiotics.  Her colonoscopy was unremarkable.  She does have a history of IBS with constipation as well.  She has a family history of Crohn's disease in her brother.    She has a history of Graves' disease and follows with endocrinology.  She is taking methimazole for her hyperthyroidism.    She has a history of fatigue.  She does not note any difficulty sleeping.  She has no history of fevers, rashes, dry eyes, or dry mouth.  She also has a history of chest pain and palpitations.  She had an echocardiogram which was unremarkable.  She also had a Holter monitor done which was also within normal limits.  Her chest pain was felt to be secondary to costochondritis.    Labs reviewed from 4/11/2023.  She did have a mildly positive rheumatoid factor.  CCP antibody negative.  SSA, SSB antibodies negative.  VIDAL negative.  Vitamin D 28.  Labs done on 11/29/2023 reveal mild anemia.  She does have orders for repeat CBC along with iron  "studies and vitamin B12 as ordered by her PCP.          The following portions of the patient's history were reviewed and updated as appropriate: allergies, current medications, past family history, past medical history, past social history, past surgical history, and problem list.    Review of Systems   Constitutional:  Positive for fatigue. Negative for chills and fever.   HENT:  Negative for hearing loss, sore throat and tinnitus.    Eyes:  Negative for pain and visual disturbance.   Respiratory:  Negative for cough and shortness of breath.    Cardiovascular:  Negative for chest pain and palpitations.   Gastrointestinal:  Negative for abdominal pain, nausea and vomiting.   Genitourinary:  Negative for difficulty urinating.   Musculoskeletal:  Positive for arthralgias, back pain, myalgias and neck pain. Negative for gait problem, joint swelling and neck stiffness.   Skin:  Negative for rash.   Neurological:  Negative for dizziness, seizures, weakness, numbness and headaches.   Psychiatric/Behavioral:  Negative for confusion, decreased concentration and sleep disturbance.          Objective:      /60 (BP Location: Left arm, Patient Position: Sitting, Cuff Size: Adult)   Pulse 96   Temp (!) 97.4 °F (36.3 °C) (Tympanic)   Ht 5' 1.5\" (1.562 m)   Wt 56.2 kg (124 lb)   SpO2 97%   BMI 23.05 kg/m²          Physical Exam  Constitutional:       Appearance: Normal appearance.   Cardiovascular:      Rate and Rhythm: Normal rate and regular rhythm.   Pulmonary:      Breath sounds: Normal breath sounds.   Musculoskeletal:         General: No swelling or tenderness.      Comments: Triggers posterior cervical and shoulder girdle muscles along with trochanteric bursa   Skin:     General: Skin is warm and dry.   Neurological:      General: No focal deficit present.      Mental Status: She is alert.         Dragon Dictation software was used to dictate this note. It may contain errors with dictating incorrect " words/spelling. Please contact provider directly for any questions.

## 2024-03-13 ENCOUNTER — CONSULT (OUTPATIENT)
Age: 24
End: 2024-03-13
Payer: COMMERCIAL

## 2024-03-13 VITALS
HEIGHT: 62 IN | TEMPERATURE: 97.4 F | WEIGHT: 124 LBS | HEART RATE: 96 BPM | SYSTOLIC BLOOD PRESSURE: 118 MMHG | DIASTOLIC BLOOD PRESSURE: 60 MMHG | OXYGEN SATURATION: 97 % | BODY MASS INDEX: 22.82 KG/M2

## 2024-03-13 DIAGNOSIS — R76.8 RHEUMATOID FACTOR POSITIVE: ICD-10-CM

## 2024-03-13 DIAGNOSIS — R76.8 POSITIVE ANA (ANTINUCLEAR ANTIBODY): Primary | ICD-10-CM

## 2024-03-13 DIAGNOSIS — E55.9 VITAMIN D DEFICIENCY: ICD-10-CM

## 2024-03-13 DIAGNOSIS — M25.50 ARTHRALGIA, UNSPECIFIED JOINT: ICD-10-CM

## 2024-03-13 DIAGNOSIS — M25.60 MORNING STIFFNESS OF JOINTS: ICD-10-CM

## 2024-03-13 DIAGNOSIS — M79.18 MYOFASCIAL PAIN: ICD-10-CM

## 2024-03-13 PROBLEM — D64.9 ANEMIA: Status: ACTIVE | Noted: 2024-03-13

## 2024-03-13 PROCEDURE — 99204 OFFICE O/P NEW MOD 45 MIN: CPT | Performed by: PHYSICIAN ASSISTANT

## 2024-03-13 NOTE — ASSESSMENT & PLAN NOTE
Her pain is myofascial in nature.  She does note feeling tender or sore to touch.  She is also very sensitive to weather changes.  As outlined above we will obtain additional lab work to rule out inflammatory arthritis or connective tissue disease as causes for her pain.  I have recommended adding a magnesium malate supplement which can be helpful for myofascial pain.  Encouraged continued exercise program as well.

## 2024-03-13 NOTE — ASSESSMENT & PLAN NOTE
Anemia on most recent CBC from November 2023.  She does have orders for additional lab work including iron studies and vitamin B12.  Follow-up with PCP.

## 2024-03-13 NOTE — ASSESSMENT & PLAN NOTE
History of widespread joint and myofascial pain which has been ongoing for several months.  She does have a history of a weak positive rheumatoid factor.  She has no signs of rheumatoid arthritis or inflammatory arthritis on exam today.  She does have a history of blood in her stool however colonoscopy was negative for any signs of IBD.  She does have a history of IBS however.  She has a history of Graves' disease with hyperthyroidism.  Given her symptoms and history we will order additional labs including workup for inflammatory arthritis and connective tissue disease.  Her symptoms are likely more myofascial in nature.  We did discuss physical therapy however she has done this in the past and did not find it to be helpful.  Encouraged continued exercise program as tolerated.  Plan to follow-up in 4 weeks or sooner if needed.

## 2024-03-13 NOTE — ASSESSMENT & PLAN NOTE
History of vitamin D deficiency.  Recommend adding over-the-counter vitamin D3 plus K2 supplement daily.  We will check vitamin D with her labs.

## 2024-03-13 NOTE — ASSESSMENT & PLAN NOTE
Questionable history of positive VIDAL.  Upon review of labs available most recent VIDAL was negative however as outlined above we will order additional lab work to rule out inflammatory arthritis or connective tissue disease including Avise CTD testing.

## 2024-03-15 DIAGNOSIS — Z30.011 ENCOUNTER FOR INITIAL PRESCRIPTION OF CONTRACEPTIVE PILLS: ICD-10-CM

## 2024-03-19 ENCOUNTER — TELEPHONE (OUTPATIENT)
Age: 24
End: 2024-03-19

## 2024-03-19 RX ORDER — NORETHINDRONE ACETATE AND ETHINYL ESTRADIOL 1MG-20(21)
1 KIT ORAL DAILY
Qty: 112 TABLET | Refills: 2 | OUTPATIENT
Start: 2024-03-19

## 2024-03-19 NOTE — TELEPHONE ENCOUNTER
Patient called to change 3/20/24 office visit to a virtual appointment.  Spoke with office.  Provide will be back in tomorrow morning.  They will check in the morning and call patient back.

## 2024-03-19 NOTE — TELEPHONE ENCOUNTER
OCP initiated by primary care office in December 2023.     Disp 84 with 2 refills given from PCP 1/15/2024 - should not yet need new rx

## 2024-03-20 NOTE — TELEPHONE ENCOUNTER
I spoke with patient - the virtual appointment was for a medical question not birth control     Patient has a medical question that she would like to discuss with Candice

## 2024-04-06 ENCOUNTER — APPOINTMENT (OUTPATIENT)
Dept: LAB | Facility: CLINIC | Age: 24
End: 2024-04-06
Payer: COMMERCIAL

## 2024-04-06 DIAGNOSIS — E05.90 HYPERTHYROIDISM: ICD-10-CM

## 2024-04-06 DIAGNOSIS — R76.8 POSITIVE ANA (ANTINUCLEAR ANTIBODY): ICD-10-CM

## 2024-04-06 DIAGNOSIS — M25.60 MORNING STIFFNESS OF JOINTS: ICD-10-CM

## 2024-04-06 DIAGNOSIS — E55.9 VITAMIN D DEFICIENCY: ICD-10-CM

## 2024-04-06 DIAGNOSIS — R76.8 RHEUMATOID FACTOR POSITIVE: ICD-10-CM

## 2024-04-06 DIAGNOSIS — M25.50 ARTHRALGIA, UNSPECIFIED JOINT: ICD-10-CM

## 2024-04-06 DIAGNOSIS — D64.9 ANEMIA, UNSPECIFIED TYPE: ICD-10-CM

## 2024-04-06 LAB
25(OH)D3 SERPL-MCNC: 33.9 NG/ML (ref 30–100)
ALBUMIN SERPL BCP-MCNC: 4.2 G/DL (ref 3.5–5)
ALP SERPL-CCNC: 46 U/L (ref 34–104)
ALT SERPL W P-5'-P-CCNC: 11 U/L (ref 7–52)
ANION GAP SERPL CALCULATED.3IONS-SCNC: 9 MMOL/L (ref 4–13)
AST SERPL W P-5'-P-CCNC: 16 U/L (ref 13–39)
BACTERIA UR QL AUTO: ABNORMAL /HPF
BASOPHILS # BLD AUTO: 0.01 THOUSANDS/ÂΜL (ref 0–0.1)
BASOPHILS NFR BLD AUTO: 0 % (ref 0–1)
BILIRUB SERPL-MCNC: 0.34 MG/DL (ref 0.2–1)
BILIRUB UR QL STRIP: NEGATIVE
BUN SERPL-MCNC: 9 MG/DL (ref 5–25)
CALCIUM SERPL-MCNC: 8.8 MG/DL (ref 8.4–10.2)
CHLORIDE SERPL-SCNC: 103 MMOL/L (ref 96–108)
CK SERPL-CCNC: 105 U/L (ref 26–192)
CLARITY UR: CLEAR
CO2 SERPL-SCNC: 27 MMOL/L (ref 21–32)
COLOR UR: ABNORMAL
CREAT SERPL-MCNC: 0.72 MG/DL (ref 0.6–1.3)
CRP SERPL QL: 2 MG/L
EOSINOPHIL # BLD AUTO: 0 THOUSAND/ÂΜL (ref 0–0.61)
EOSINOPHIL NFR BLD AUTO: 0 % (ref 0–6)
ERYTHROCYTE [DISTWIDTH] IN BLOOD BY AUTOMATED COUNT: 14.8 % (ref 11.6–15.1)
FERRITIN SERPL-MCNC: 9 NG/ML (ref 11–307)
GFR SERPL CREATININE-BSD FRML MDRD: 119 ML/MIN/1.73SQ M
GLUCOSE P FAST SERPL-MCNC: 84 MG/DL (ref 65–99)
GLUCOSE UR STRIP-MCNC: NEGATIVE MG/DL
HCT VFR BLD AUTO: 36.5 % (ref 34.8–46.1)
HGB BLD-MCNC: 11.6 G/DL (ref 11.5–15.4)
HGB UR QL STRIP.AUTO: NEGATIVE
IMM GRANULOCYTES # BLD AUTO: 0 THOUSAND/UL (ref 0–0.2)
IMM GRANULOCYTES NFR BLD AUTO: 0 % (ref 0–2)
IRON SATN MFR SERPL: 7 % (ref 15–50)
IRON SERPL-MCNC: 23 UG/DL (ref 50–212)
KETONES UR STRIP-MCNC: NEGATIVE MG/DL
LEUKOCYTE ESTERASE UR QL STRIP: NEGATIVE
LYMPHOCYTES # BLD AUTO: 1.03 THOUSANDS/ÂΜL (ref 0.6–4.47)
LYMPHOCYTES NFR BLD AUTO: 31 % (ref 14–44)
MCH RBC QN AUTO: 27.3 PG (ref 26.8–34.3)
MCHC RBC AUTO-ENTMCNC: 31.8 G/DL (ref 31.4–37.4)
MCV RBC AUTO: 86 FL (ref 82–98)
MONOCYTES # BLD AUTO: 0.3 THOUSAND/ÂΜL (ref 0.17–1.22)
MONOCYTES NFR BLD AUTO: 9 % (ref 4–12)
MUCOUS THREADS UR QL AUTO: ABNORMAL
NEUTROPHILS # BLD AUTO: 2.03 THOUSANDS/ÂΜL (ref 1.85–7.62)
NEUTS SEG NFR BLD AUTO: 60 % (ref 43–75)
NITRITE UR QL STRIP: NEGATIVE
NON-SQ EPI CELLS URNS QL MICRO: ABNORMAL /HPF
NRBC BLD AUTO-RTO: 0 /100 WBCS
PH UR STRIP.AUTO: 6 [PH]
PLATELET # BLD AUTO: 226 THOUSANDS/UL (ref 149–390)
PMV BLD AUTO: 13 FL (ref 8.9–12.7)
POTASSIUM SERPL-SCNC: 4 MMOL/L (ref 3.5–5.3)
PROT SERPL-MCNC: 7.7 G/DL (ref 6.4–8.4)
PROT UR STRIP-MCNC: ABNORMAL MG/DL
RBC # BLD AUTO: 4.25 MILLION/UL (ref 3.81–5.12)
RBC #/AREA URNS AUTO: ABNORMAL /HPF
SODIUM SERPL-SCNC: 139 MMOL/L (ref 135–147)
SP GR UR STRIP.AUTO: 1.02 (ref 1–1.03)
T3FREE SERPL-MCNC: 4.03 PG/ML (ref 2.5–3.9)
T4 FREE SERPL-MCNC: 0.76 NG/DL (ref 0.61–1.12)
TIBC SERPL-MCNC: 351 UG/DL (ref 250–450)
TSH SERPL DL<=0.05 MIU/L-ACNC: 0.54 UIU/ML (ref 0.45–4.5)
UIBC SERPL-MCNC: 328 UG/DL (ref 155–355)
UROBILINOGEN UR STRIP-ACNC: <2 MG/DL
VIT B12 SERPL-MCNC: 473 PG/ML (ref 180–914)
WBC # BLD AUTO: 3.37 THOUSAND/UL (ref 4.31–10.16)
WBC #/AREA URNS AUTO: ABNORMAL /HPF

## 2024-04-06 PROCEDURE — 86200 CCP ANTIBODY: CPT

## 2024-04-06 PROCEDURE — 80053 COMPREHEN METABOLIC PANEL: CPT

## 2024-04-06 PROCEDURE — 84439 ASSAY OF FREE THYROXINE: CPT

## 2024-04-06 PROCEDURE — 86140 C-REACTIVE PROTEIN: CPT

## 2024-04-06 PROCEDURE — 36415 COLL VENOUS BLD VENIPUNCTURE: CPT

## 2024-04-06 PROCEDURE — 82607 VITAMIN B-12: CPT

## 2024-04-06 PROCEDURE — 86430 RHEUMATOID FACTOR TEST QUAL: CPT

## 2024-04-06 PROCEDURE — 82306 VITAMIN D 25 HYDROXY: CPT

## 2024-04-06 PROCEDURE — 84443 ASSAY THYROID STIM HORMONE: CPT

## 2024-04-06 PROCEDURE — 84481 FREE ASSAY (FT-3): CPT

## 2024-04-06 PROCEDURE — 86038 ANTINUCLEAR ANTIBODIES: CPT

## 2024-04-06 PROCEDURE — 82550 ASSAY OF CK (CPK): CPT

## 2024-04-06 PROCEDURE — 86618 LYME DISEASE ANTIBODY: CPT

## 2024-04-07 LAB
ANA SER QL IA: NEGATIVE
B BURGDOR IGG+IGM SER QL IA: NEGATIVE
RHEUMATOID FACT SER QL LA: NEGATIVE

## 2024-04-09 ENCOUNTER — TELEPHONE (OUTPATIENT)
Age: 24
End: 2024-04-09

## 2024-04-09 DIAGNOSIS — R30.0 DYSURIA: Primary | ICD-10-CM

## 2024-04-09 LAB — CCP AB SER IA-ACNC: 1.4

## 2024-04-09 NOTE — TELEPHONE ENCOUNTER
----- Message from Georgia Aguilera PA-C sent at 4/9/2024  9:56 AM EDT -----  Abnormal u/a. If any sx's of UTI we can order urine c&s.

## 2024-04-09 NOTE — TELEPHONE ENCOUNTER
----- Message from Rosnia Wills sent at 4/9/2024  2:59 PM EDT -----  Regarding: FW: Recent Results  Contact: [phone]    ----- Message -----  From: Lesli Logan MA  Sent: 4/9/2024   2:56 PM EDT  To: Rheumatology [facility]  Subject: FW: Recent Results                                 ----- Message -----  From: Russ Fajardo  Sent: 4/9/2024   2:53 PM EDT  To: Rheumatology [facility]  Subject: Recent Results                                   Thais Almaguer,    I been experiencing a lot of discomfort and burning but I did just get my monthly so I am not sure if that would interfere with the test if I were to get it done.

## 2024-04-09 NOTE — TELEPHONE ENCOUNTER
That can cause urinalysis to look abnormal however if she is having symptoms I would recommend doing a urine culture.  I placed the order for her.

## 2024-04-10 ENCOUNTER — OFFICE VISIT (OUTPATIENT)
Dept: ENDOCRINOLOGY | Facility: CLINIC | Age: 24
End: 2024-04-10
Payer: COMMERCIAL

## 2024-04-10 ENCOUNTER — PATIENT MESSAGE (OUTPATIENT)
Dept: OBGYN CLINIC | Facility: CLINIC | Age: 24
End: 2024-04-10

## 2024-04-10 ENCOUNTER — TELEMEDICINE (OUTPATIENT)
Dept: OBGYN CLINIC | Facility: CLINIC | Age: 24
End: 2024-04-10
Payer: COMMERCIAL

## 2024-04-10 VITALS
HEART RATE: 85 BPM | SYSTOLIC BLOOD PRESSURE: 110 MMHG | DIASTOLIC BLOOD PRESSURE: 84 MMHG | HEIGHT: 61 IN | WEIGHT: 122 LBS | BODY MASS INDEX: 23.03 KG/M2

## 2024-04-10 VITALS — BODY MASS INDEX: 23.05 KG/M2 | WEIGHT: 122 LBS

## 2024-04-10 DIAGNOSIS — Z86.19 HISTORY OF HERPES GENITALIS: Primary | ICD-10-CM

## 2024-04-10 DIAGNOSIS — E55.9 VITAMIN D DEFICIENCY: ICD-10-CM

## 2024-04-10 DIAGNOSIS — E61.1 IRON DEFICIENCY: ICD-10-CM

## 2024-04-10 DIAGNOSIS — Z30.41 ENCOUNTER FOR SURVEILLANCE OF CONTRACEPTIVE PILLS: ICD-10-CM

## 2024-04-10 DIAGNOSIS — E05.00 GRAVES DISEASE: ICD-10-CM

## 2024-04-10 DIAGNOSIS — Z30.41 ENCOUNTER FOR SURVEILLANCE OF CONTRACEPTIVE PILLS: Primary | ICD-10-CM

## 2024-04-10 DIAGNOSIS — E05.90 HYPERTHYROIDISM: Primary | ICD-10-CM

## 2024-04-10 PROCEDURE — 99214 OFFICE O/P EST MOD 30 MIN: CPT

## 2024-04-10 PROCEDURE — 99213 OFFICE O/P EST LOW 20 MIN: CPT | Performed by: PHYSICIAN ASSISTANT

## 2024-04-10 RX ORDER — NORETHINDRONE ACETATE AND ETHINYL ESTRADIOL 1MG-20(21)
1 KIT ORAL DAILY
Qty: 84 TABLET | Refills: 2 | Status: SHIPPED | OUTPATIENT
Start: 2024-04-10 | End: 2024-04-11

## 2024-04-10 RX ORDER — VALACYCLOVIR HYDROCHLORIDE 1 G/1
1000 TABLET, FILM COATED ORAL DAILY
Qty: 5 TABLET | Refills: 5 | Status: SHIPPED | OUTPATIENT
Start: 2024-04-10 | End: 2024-04-15

## 2024-04-10 RX ORDER — PROPRANOLOL HYDROCHLORIDE 40 MG/1
40 TABLET ORAL 2 TIMES DAILY
Qty: 180 TABLET | Refills: 1 | Status: SHIPPED | OUTPATIENT
Start: 2024-04-10

## 2024-04-10 RX ORDER — METHIMAZOLE 5 MG/1
TABLET ORAL
Qty: 60 TABLET | Refills: 2 | Status: SHIPPED | OUTPATIENT
Start: 2024-04-10

## 2024-04-10 NOTE — PROGRESS NOTES
Established Patient Progress Note      Chief Complaint   Patient presents with    Hyperthyroidism        Impression & Plan:    Problem List Items Addressed This Visit          Endocrine    Hyperthyroidism - Primary     Thyroid function testing within target range. Recommend she continue current dose of methimazole. She denies any fever, sore throat or rash. Repeat labs in 3 months. Will also check CBC and CMP. She denies any plans for pregnancy in the near future.          Relevant Medications    methimazole (TAPAZOLE) 5 mg tablet    propranolol (INDERAL) 40 mg tablet       Other    Vitamin D deficiency     Continue supplementation.          Iron deficiency     She has mild anemia and iron deficiency. She is currently taking 325 mg iron daily. Recommend she increase to twice daily taken with food and vitamin c supplement to increase absorption. I also recommend she follow up with GYN due to heavy menstrual bleeding most likely contributing to iron deficiency.          Relevant Orders    Iron Panel (Includes Ferritin, Iron Sat%, Iron, and TIBC)     Other Visit Diagnoses       Graves disease        Relevant Medications    methimazole (TAPAZOLE) 5 mg tablet    propranolol (INDERAL) 40 mg tablet    Other Relevant Orders    TSH, 3rd generation    T4, free    TSH, 3rd generation    T4, free    Comprehensive metabolic panel    CBC and differential            History of Present Illness:   Russ Fajardo is a 24 y.o. female with hyperthyroidism seen in follow up. Uptake scan and antibody suggesting Graves' disease as cause of hyperthyroidism. She is currently taking methimazole 10 mg daily. She denies any symptoms associated with hyperthyroidism. She does have symptoms of dizziness, fatigue and muscle/joint pain. She is also following with rheumatology. She was also found to have iron and vitamin d deficiency in which she is taking both iron and vitamin d supplementation.      Patient Active Problem List   Diagnosis     Need for meningitis vaccination    Pilonidal abscess    Chronic left shoulder pain    Costochondritis    Cervical spine pain    Back pain    Body aches    Positive VIDAL (antinuclear antibody)    Hyperthyroidism    Blood in stool    Dizzy    Rheumatoid factor positive    Myofascial pain    Vitamin D deficiency    Anemia    Iron deficiency      Past Medical History:   Diagnosis Date    Constipation     Hyperthyroidism     Pilonidal cyst     Wears glasses       Past Surgical History:   Procedure Laterality Date    INCISION AND DRAINAGE ABSCESS ANAL      Pilonidal Cyst  4 times-cut & drained in office-reoccurred    AL EXCISION PILONIDAL CYST/SINUS COMPLICATED N/A 5/23/2019    Procedure: EXCISION PILONIDAL CYST;  Surgeon: Paola Chavarria MD;  Location: [facility];  Service: General    AL EXCISION PILONIDAL CYST/SINUS SIMPLE N/A 4/5/2018    Procedure: EXCISION PILONIDAL CYST and debriding;  Surgeon: Paola Chavarria MD;  Location: [facility];  Service: General      Family History   Problem Relation Age of Onset    No Known Problems Mother     No Known Problems Father     Crohn's disease Brother     Cancer Maternal Grandmother     Diabetes Maternal Grandmother     Heart disease Maternal Grandmother     Hypertension Maternal Grandmother     Cancer Paternal Grandmother     Cancer Other     Cancer Other     Cancer Cousin      Social History     Tobacco Use    Smoking status: Never    Smokeless tobacco: Never   Substance Use Topics    Alcohol use: Yes     Comment: socially     Allergies   Allergen Reactions    Sulfa Antibiotics Hives       Current Outpatient Medications:     cyclobenzaprine (FLEXERIL) 5 mg tablet, Take 1 tablet (5 mg total) by mouth 2 (two) times a day as needed for muscle spasms, Disp: 20 tablet, Rfl: 0    famotidine (PEPCID) 20 mg tablet, TAKE 1 TABLET BY MOUTH TWICE A DAY, Disp: 180 tablet, Rfl: 1    meloxicam (Mobic) 7.5 mg tablet, Take 1 tablet (7.5 mg total) by mouth daily, Disp: 20 tablet, Rfl: 0     methimazole (TAPAZOLE) 5 mg tablet, TAKE 2 TABLETS BY MOUTH EVERY DAY, Disp: 60 tablet, Rfl: 2    omeprazole (PriLOSEC) 40 MG capsule, TAKE 1 CAPSULE (40 MG TOTAL) BY MOUTH DAILY., Disp: 90 capsule, Rfl: 1    propranolol (INDERAL) 40 mg tablet, Take 1 tablet (40 mg total) by mouth 2 (two) times a day, Disp: 180 tablet, Rfl: 1    Sodium Fluoride 5000 PPM 1.1 % PSTE, 2 (two) times a day, Disp: , Rfl:     sucralfate (CARAFATE) 1 g tablet, TAKE 1 TABLET (1 G TOTAL) BY MOUTH 4 (FOUR) TIMES A DAY AS NEEDED (ABDOMINAL PAIN), Disp: 360 tablet, Rfl: 1    Junel FE 1/20 1-20 MG-MCG per tablet, TAKE 1 TABLET BY MOUTH EVERY DAY (Patient not taking: Reported on 4/10/2024), Disp: 84 tablet, Rfl: 2    norethindrone-ethinyl estradiol (MICROGESTIN 1/20) 1-20 MG-MCG per tablet, Take 1 tablet by mouth daily (Patient not taking: Reported on 1/3/2024), Disp: 90 tablet, Rfl: 3    nystatin-triamcinolone (MYCOLOG-II) ointment, Apply topically 2 (two) times a day As needed for itching and irritation (Patient not taking: Reported on 3/13/2024), Disp: 30 g, Rfl: 0    Review of Systems   Constitutional:  Positive for fatigue. Negative for activity change, appetite change, chills, diaphoresis, fever and unexpected weight change.   HENT:  Negative for sore throat, trouble swallowing and voice change.    Eyes:  Negative for visual disturbance.   Cardiovascular:  Negative for chest pain and palpitations.   Gastrointestinal:  Negative for constipation, diarrhea, nausea and vomiting.   Endocrine: Negative for cold intolerance and heat intolerance.   Genitourinary:  Positive for menstrual problem (heavy menstural periods).   Musculoskeletal:  Positive for arthralgias and myalgias.   Skin:  Negative for rash.   Neurological:  Positive for dizziness. Negative for tremors and weakness.   Psychiatric/Behavioral:  Negative for sleep disturbance. The patient is not nervous/anxious.    All other systems reviewed and are negative.      Physical Exam:  Body  "mass index is 23.05 kg/m².  /84   Pulse 85   Ht 5' 1\" (1.549 m)   Wt 55.3 kg (122 lb)   BMI 23.05 kg/m²    Wt Readings from Last 3 Encounters:   04/10/24 55.3 kg (122 lb)   03/13/24 56.2 kg (124 lb)   02/29/24 55.8 kg (123 lb)       Physical Exam  Vitals reviewed.   Constitutional:       Appearance: Normal appearance. She is normal weight.   Neck:      Thyroid: Thyromegaly present. No thyroid mass or thyroid tenderness.   Pulmonary:      Effort: Pulmonary effort is normal.   Neurological:      Mental Status: She is alert and oriented to person, place, and time.   Psychiatric:         Mood and Affect: Mood normal.         Thought Content: Thought content normal.       Labs:     Component      Latest Ref Rn 4/6/2024   VITAMIN D, 25-HYDROXY      30.0 - 100.0 ng/mL 33.9    TSH 3RD GENERATON      0.450 - 4.500 uIU/mL 0.540      Component      Latest Ref Rn 4/6/2024   FREE T4      0.61 - 1.12 ng/dL 0.76      Component      Latest Ref Rn 4/6/2024   Sodium      135 - 147 mmol/L 139    Potassium      3.5 - 5.3 mmol/L 4.0    Chloride      96 - 108 mmol/L 103    Carbon Dioxide      21 - 32 mmol/L 27    ANION GAP      4 - 13 mmol/L 9    BUN      5 - 25 mg/dL 9    Creatinine      0.60 - 1.30 mg/dL 0.72    GLUCOSE, FASTING      65 - 99 mg/dL 84    Calcium      8.4 - 10.2 mg/dL 8.8    AST      13 - 39 U/L 16    ALT      7 - 52 U/L 11    ALK PHOS      34 - 104 U/L 46    Total Protein      6.4 - 8.4 g/dL 7.7    Albumin      3.5 - 5.0 g/dL 4.2    Total Bilirubin      0.20 - 1.00 mg/dL 0.34    GFR, Calculated      ml/min/1.73sq m 119      Component      Latest Ref Rn 4/6/2024   WBC      4.31 - 10.16 Thousand/uL 3.37 (L)    RBC      3.81 - 5.12 Million/uL 4.25    Hemoglobin      11.5 - 15.4 g/dL 11.6    Hematocrit      34.8 - 46.1 % 36.5    MCV      82 - 98 fL 86    MCH      26.8 - 34.3 pg 27.3    MCHC      31.4 - 37.4 g/dL 31.8    RDW      11.6 - 15.1 % 14.8    MPV      8.9 - 12.7 fL 13.0 (H)    Platelet Count      149 - " 390 Thousands/uL 226    nRBC      /100 WBCs 0    Segmented %      43 - 75 % 60    Immature Grans %      0 - 2 % 0    Lymphocytes %      14 - 44 % 31    Monocytes %      4 - 12 % 9    Eosinophils %      0 - 6 % 0    Basophils %      0 - 1 % 0    Absolute Neutrophils      1.85 - 7.62 Thousands/µL 2.03    Absolute Immature Grans      0.00 - 0.20 Thousand/uL 0.00    Absolute Lymphocytes      0.60 - 4.47 Thousands/µL 1.03    Absolute Monocytes      0.17 - 1.22 Thousand/µL 0.30    Absolute Eosinophils      0.00 - 0.61 Thousand/µL 0.00    Absolute Basophils      0.00 - 0.10 Thousands/µL 0.01    Iron Saturation      15 - 50 % 7 (L)    TIBC      250 - 450 ug/dL 351    Iron      50 - 212 ug/dL 23 (L)    UIBC      155 - 355 ug/dL 328    FERRITIN      11 - 307 ng/mL 9 (L)       Legend:  (L) Low  (H) High      There are no Patient Instructions on file for this visit.    Discussed with the patient and all questioned fully answered. She will call me if any problems arise.    VIVIAN Farmer

## 2024-04-10 NOTE — ASSESSMENT & PLAN NOTE
Thyroid function testing within target range. Recommend she continue current dose of methimazole. She denies any fever, sore throat or rash. Repeat labs in 3 months. Will also check CBC and CMP. She denies any plans for pregnancy in the near future.

## 2024-04-10 NOTE — ASSESSMENT & PLAN NOTE
She has mild anemia and iron deficiency. She is currently taking 325 mg iron daily. Recommend she increase to twice daily taken with food and vitamin c supplement to increase absorption. I also recommend she follow up with GYN due to heavy menstrual bleeding most likely contributing to iron deficiency.

## 2024-04-11 RX ORDER — NORETHINDRONE ACETATE AND ETHINYL ESTRADIOL 1MG-20(21)
1 KIT ORAL DAILY
Qty: 84 TABLET | Refills: 2 | Status: SHIPPED | OUTPATIENT
Start: 2024-04-11

## 2024-04-12 NOTE — PROGRESS NOTES
Russ Fajardo  2000      1. This service was provided via Telemedicine.  2. Provider located at [facility].  3. TeleMed provider: Candice Burkett PA-C.  4. Identify all parties in room with patient during tele consult:none  5. After connecting through [employer], patient was identified by name and date of birth. Patient was then informed that this was a Telemedicine visit and that the exam was being conducted confidentially over secure lines. My office door was closed. No one else was in the room.  Patient acknowledged consent and understanding of privacy and security of the Telemedicine visit. I informed the patient that I have reviewed their record in Epic and presented the opportunity for them to ask any questions regarding the visit today.  The patient agreed to participate.      S:  24 y.o. female here for information regarding HSV virus. Diagnosed with genital herpes at age 18 following outbreak with vulvar lesions. Her partner at the time had a hx of cold sores and performed oral sex near the time of an outbreak. She has since had 1-2 outbreaks and is seeking care today to discuss infection further.     Also requests refill of birth control pill. Was started on loestrin by PCP late last year. F/u visit for other problem shows she remained normotensive on pill. HAS been off pill x 1 month due to lack of refills. Period started 4/6/2024.       Current Outpatient Medications:     cyclobenzaprine (FLEXERIL) 5 mg tablet, Take 1 tablet (5 mg total) by mouth 2 (two) times a day as needed for muscle spasms, Disp: 20 tablet, Rfl: 0    famotidine (PEPCID) 20 mg tablet, TAKE 1 TABLET BY MOUTH TWICE A DAY, Disp: 180 tablet, Rfl: 1    meloxicam (Mobic) 7.5 mg tablet, Take 1 tablet (7.5 mg total) by mouth daily, Disp: 20 tablet, Rfl: 0    methimazole (TAPAZOLE) 5 mg tablet, TAKE 2 TABLETS BY MOUTH EVERY DAY, Disp: 60 tablet, Rfl: 2    omeprazole (PriLOSEC) 40 MG capsule, TAKE 1 CAPSULE (40 MG TOTAL)  BY MOUTH DAILY., Disp: 90 capsule, Rfl: 1    propranolol (INDERAL) 40 mg tablet, Take 1 tablet (40 mg total) by mouth 2 (two) times a day, Disp: 180 tablet, Rfl: 1    Sodium Fluoride 5000 PPM 1.1 % PSTE, 2 (two) times a day, Disp: , Rfl:     sucralfate (CARAFATE) 1 g tablet, TAKE 1 TABLET (1 G TOTAL) BY MOUTH 4 (FOUR) TIMES A DAY AS NEEDED (ABDOMINAL PAIN), Disp: 360 tablet, Rfl: 1    valACYclovir (VALTREX) 1,000 mg tablet, Take 1 tablet (1,000 mg total) by mouth daily for 5 days, Disp: 5 tablet, Rfl: 5    norethindrone-ethinyl estradiol (Loestrin Fe 1/20) 1-20 MG-MCG per tablet, Take 1 tablet by mouth daily, Disp: 84 tablet, Rfl: 2    nystatin-triamcinolone (MYCOLOG-II) ointment, Apply topically 2 (two) times a day As needed for itching and irritation (Patient not taking: Reported on 3/13/2024), Disp: 30 g, Rfl: 0  Social History     Socioeconomic History    Marital status: Single     Spouse name: Not on file    Number of children: Not on file    Years of education: Not on file    Highest education level: Not on file   Occupational History    Occupation: [employer]   Tobacco Use    Smoking status: Never    Smokeless tobacco: Never   Vaping Use    Vaping status: Never Used   Substance and Sexual Activity    Alcohol use: Yes     Comment: socially    Drug use: No    Sexual activity: Yes     Partners: Male     Birth control/protection: Condom Male   Other Topics Concern    Not on file   Social History Narrative    Not on file     Social Determinants of Health     Financial Resource Strain: Low Risk  (3/17/2023)    Overall Financial Resource Strain (CARDIA)     Difficulty of Paying Living Expenses: Not hard at all   Food Insecurity: No Food Insecurity (3/17/2023)    Hunger Vital Sign     Worried About Running Out of Food in the Last Year: Never true     Ran Out of Food in the Last Year: Never true   Transportation Needs: No Transportation Needs (3/17/2023)    PRAPARE - Transportation     Lack of Transportation  (Medical): No     Lack of Transportation (Non-Medical): No   Physical Activity: Sufficiently Active (4/7/2022)    Exercise Vital Sign     Days of Exercise per Week: 3 days     Minutes of Exercise per Session: 60 min   Stress: No Stress Concern Present (4/7/2022)    [place] [employer] - Occupational Stress Questionnaire     Feeling of Stress : Not at all   Social Connections: Not on file   Intimate Partner Violence: Not At Risk (8/17/2022)    Humiliation, Afraid, Rape, and Kick questionnaire     Fear of Current or Ex-Partner: No     Emotionally Abused: No     Physically Abused: No     Sexually Abused: No   Housing Stability: Low Risk  (3/17/2023)    Housing Stability Vital Sign     Unable to Pay for Housing in the Last Year: No     Number of Places Lived in the Last Year: 2     Unstable Housing in the Last Year: No     Family History   Problem Relation Age of Onset    No Known Problems Mother     No Known Problems Father     Crohn's disease Brother     Cancer Maternal Grandmother     Diabetes Maternal Grandmother     Heart disease Maternal Grandmother     Hypertension Maternal Grandmother     Cancer Paternal Grandmother     Cancer Other     Cancer Other     Cancer Cousin      Past Medical History:   Diagnosis Date    Constipation     Hyperthyroidism     Pilonidal cyst     Wears glasses        ROS:  Constitutional: Negative.  Genitourinary:  + hx of HSV. Negative for difficulty urinating, frequency, urgency, pelvic pain, vaginal discharge, odor or itching.      O:  Weight 55.3 kg (122 lb), last menstrual period 04/06/2024, not currently breastfeeding.    She appears well and is in no distress  Seated comfortably at home.   Normocephalic, atraumatic.   Normal respiratory effort.   Normal mood, affect, speechand behavior.     A/P:  Diagnoses and all orders for this visit:    History of herpes genitalis  -     valACYclovir (VALTREX) 1,000 mg tablet; Take 1 tablet (1,000 mg total) by mouth daily for  5 days    Reviewed HSV transmission, life cycle, and episodes/outbreaks. Discussed prodromal symptoms (tingling, itching, burning, localized pain, irritation, etc.) to be the first sign of an outbreak. At the first sign of an outbreak you can take the Valtrex, to hopefully prevent further symptoms, decrease the severity of your symptoms, and ideally shorten the duration of the outbreak. The Valtrex is most effective if started within 72 hours of your symptoms beginning.    Advised to notify any current or future partners that you have genital herpes. This can help to reduce the risk of transmission to them. This virus is spread by direct contact with your skin/tissue. It is technically possible to spread the virus during times that you are not having an outbreak, but typically more likely to spread and more important to abstain from sex during times that you are having the prodromal symptoms (those symptoms experienced right before an outbreak), during the outbreak itself, and until all skin heals/returns to normal. Condoms can help to significantly reduce the risk of transmission during times you are not having an active outbreak, so I would recommend using them when active.     Reviewed importance of suppressive valtrex therapy in anticipation of vaginal delivery if becomes pregnant in future.    All questions answered to her satisfaction. Rx for prn valtrex sent to pharmacy.     Encounter for surveillance of contraceptive pills  -    Norethindrone-ethinyl estradiol (Junel FE 1/20) 1-20 MG-MCG per tablet; Take 1 tablet by mouth daily    Denies contraindications to estrogen. Happy with loestrin and desires to continue. Reviewed restarting pill, time to efficacy, s/e, management of missed pills, and sx to report. Refill sent to pharmacy until annual exam.

## 2024-04-17 LAB — HLA-B27 QL NAA+PROBE: NORMAL

## 2024-05-02 DIAGNOSIS — E05.90 HYPERTHYROIDISM: ICD-10-CM

## 2024-05-03 RX ORDER — METHIMAZOLE 5 MG/1
TABLET ORAL
Qty: 180 TABLET | Refills: 1 | Status: SHIPPED | OUTPATIENT
Start: 2024-05-03

## 2024-05-17 ENCOUNTER — TELEPHONE (OUTPATIENT)
Dept: FAMILY MEDICINE CLINIC | Facility: CLINIC | Age: 24
End: 2024-05-17

## 2024-05-17 DIAGNOSIS — N30.00 ACUTE CYSTITIS WITHOUT HEMATURIA: Primary | ICD-10-CM

## 2024-05-17 RX ORDER — NITROFURANTOIN 25; 75 MG/1; MG/1
100 CAPSULE ORAL 2 TIMES DAILY
Qty: 10 CAPSULE | Refills: 0 | Status: SHIPPED | OUTPATIENT
Start: 2024-05-17 | End: 2024-05-22

## 2024-05-17 NOTE — TELEPHONE ENCOUNTER
Patient called in requesting medication for a UTI, patient states symptoms have been on going for about a week. Patient was recently on amoxicillin due to dental procedure.

## 2024-05-23 ENCOUNTER — TELEMEDICINE (OUTPATIENT)
Age: 24
End: 2024-05-23
Payer: COMMERCIAL

## 2024-05-23 DIAGNOSIS — E55.9 VITAMIN D DEFICIENCY: ICD-10-CM

## 2024-05-23 DIAGNOSIS — R76.8 RHEUMATOID FACTOR POSITIVE: ICD-10-CM

## 2024-05-23 DIAGNOSIS — M79.18 MYOFASCIAL PAIN SYNDROME: Primary | ICD-10-CM

## 2024-05-23 DIAGNOSIS — E61.1 IRON DEFICIENCY: ICD-10-CM

## 2024-05-23 DIAGNOSIS — Z79.899 ENCOUNTER FOR LONG-TERM (CURRENT) USE OF OTHER MEDICATIONS: ICD-10-CM

## 2024-05-23 PROCEDURE — 99214 OFFICE O/P EST MOD 30 MIN: CPT | Performed by: PHYSICIAN ASSISTANT

## 2024-05-23 RX ORDER — CYCLOBENZAPRINE HCL 5 MG
5 TABLET ORAL 2 TIMES DAILY PRN
Qty: 180 TABLET | Refills: 1 | Status: SHIPPED | OUTPATIENT
Start: 2024-05-23 | End: 2024-11-19

## 2024-05-23 RX ORDER — MELOXICAM 7.5 MG/1
7.5 TABLET ORAL 2 TIMES DAILY PRN
Qty: 180 TABLET | Refills: 1 | Status: SHIPPED | OUTPATIENT
Start: 2024-05-23 | End: 2024-11-19

## 2024-05-23 NOTE — PROGRESS NOTES
Virtual Regular Visit    Verification of patient location:    Patient is located at Home in the following state in which I hold an active license [place]      Assessment/Plan:    Problem List Items Addressed This Visit          Musculoskeletal and Integument    Myofascial pain syndrome - Primary     Generalized myofascial pain which is stable to improved.  She is using cyclobenzaprine as needed as well as meloxicam which helps with her pain.  She does note that her symptoms are most pronounced with weather changes and rainy days.  Encouraged regular home exercise program.  Labs as ordered to monitor for medication side effects and toxicity.  Follow-up in 6 months or sooner if needed.         Relevant Medications    meloxicam (Mobic) 7.5 mg tablet    cyclobenzaprine (FLEXERIL) 5 mg tablet    Other Relevant Orders    CBC and differential    Comprehensive metabolic panel    Sedimentation rate, automated    C-reactive protein       Other    Rheumatoid factor positive     History of positive rheumatoid factor.  Recent labs revealed negative rheumatoid factor.  She has no signs of any inflammatory arthritis at this time.  We will continue to monitor.         Vitamin D deficiency     Vitamin D improved on most recent labs.  Continue over-the-counter vitamin D3 supplement.         Iron deficiency     Continue iron supplement.          Other Visit Diagnoses       Encounter for long-term (current) use of other medications        Relevant Orders    CBC and differential    Comprehensive metabolic panel    Sedimentation rate, automated    C-reactive protein                 Reason for visit is   Chief Complaint   Patient presents with    Virtual Regular Visit          Encounter provider Georgia Aguilera PA-C      Recent Visits  No visits were found meeting these conditions.  Showing recent visits within past 7 days and meeting all other requirements  Today's Visits  Date Type Provider Dept   05/23/24 Telemedicine Georgia Aguilera PA-C [facility]  [facility]   Showing today's visits and meeting all other requirements  Future Appointments  No visits were found meeting these conditions.  Showing future appointments within next 150 days and meeting all other requirements       The patient was identified by name and date of birth. Russ Fajardo was informed that this is a telemedicine visit and that the visit is being conducted through the Epic Embedded platform. She agrees to proceed..  My office door was closed. No one else was in the room.  She acknowledged consent and understanding of privacy and security of the video platform. The patient has agreed to participate and understands they can discontinue the visit at any time.    Patient is aware this is a billable service.     Subjective  Russ Fajardo is a 24 y.o. female.  She is here for follow-up of myofascial pain with history of a positive rheumatoid factor.  She has a history of widespread joint and myofascial pain.  She does note feeling tender or sore to touch and is also very sensitive to weather changes.  She has tried physical therapy in the past however this did not provide any significant relief for her.  She is currently taking cyclobenzaprine 5 mg as needed along with meloxicam.  She does find that this has helped with her joint pain and overall she has been feeling better.  She also added a vitamin D supplement as well as a magnesium malate supplement.    She has a history of hyperthyroidism and follows with endocrinology.  She is currently taking methimazole.  She has a history of iron deficiency anemia.  She is currently taking an iron supplement.    She had labs done on 4/6/2024.  WBC 3.37.  Rheumatoid factor, CCP negative.  CK within normal limits.  HLA-B27 antigen negative.  VIDAL negative.  TSH within normal limits.  Vitamin D 33.9.  Lyme antibody negative.  CK within normal limits.         Past Medical History:   Diagnosis Date    Constipation      Hyperthyroidism     Pilonidal cyst     Wears glasses        Past Surgical History:   Procedure Laterality Date    INCISION AND DRAINAGE ABSCESS ANAL      Pilonidal Cyst  4 times-cut & drained in office-reoccurred    IN EXCISION PILONIDAL CYST/SINUS COMPLICATED N/A 5/23/2019    Procedure: EXCISION PILONIDAL CYST;  Surgeon: Paola Chavarria MD;  Location: [facility];  Service: General    IN EXCISION PILONIDAL CYST/SINUS SIMPLE N/A 4/5/2018    Procedure: EXCISION PILONIDAL CYST and debriding;  Surgeon: Paola Chavarria MD;  Location: [facility];  Service: General       Current Outpatient Medications   Medication Sig Dispense Refill    cyclobenzaprine (FLEXERIL) 5 mg tablet Take 1 tablet (5 mg total) by mouth 2 (two) times a day as needed for muscle spasms 180 tablet 1    meloxicam (Mobic) 7.5 mg tablet Take 1 tablet (7.5 mg total) by mouth 2 (two) times a day as needed for moderate pain 180 tablet 1    famotidine (PEPCID) 20 mg tablet TAKE 1 TABLET BY MOUTH TWICE A  tablet 1    methimazole (TAPAZOLE) 5 mg tablet TAKE 2 TABLETS BY MOUTH EVERY  tablet 1    norethindrone-ethinyl estradiol (Loestrin Fe 1/20) 1-20 MG-MCG per tablet Take 1 tablet by mouth daily 84 tablet 2    nystatin-triamcinolone (MYCOLOG-II) ointment Apply topically 2 (two) times a day As needed for itching and irritation (Patient not taking: Reported on 3/13/2024) 30 g 0    omeprazole (PriLOSEC) 40 MG capsule TAKE 1 CAPSULE (40 MG TOTAL) BY MOUTH DAILY. 90 capsule 1    propranolol (INDERAL) 40 mg tablet Take 1 tablet (40 mg total) by mouth 2 (two) times a day 180 tablet 1    Sodium Fluoride 5000 PPM 1.1 % PSTE 2 (two) times a day      sucralfate (CARAFATE) 1 g tablet TAKE 1 TABLET (1 G TOTAL) BY MOUTH 4 (FOUR) TIMES A DAY AS NEEDED (ABDOMINAL PAIN) 360 tablet 1    valACYclovir (VALTREX) 1,000 mg tablet Take 1 tablet (1,000 mg total) by mouth daily for 5 days 5 tablet 5     No current facility-administered medications for this visit.         Allergies   Allergen Reactions    Sulfa Antibiotics Hives       Review of Systems   Constitutional:  Positive for fatigue (Stable). Negative for chills and fever.   HENT:  Negative for hearing loss, sore throat and tinnitus.    Eyes:  Negative for pain and visual disturbance.   Respiratory:  Negative for cough and shortness of breath.    Cardiovascular:  Negative for chest pain and palpitations.   Gastrointestinal:  Negative for abdominal pain, nausea and vomiting.   Genitourinary:  Negative for difficulty urinating.   Musculoskeletal:  Positive for arthralgias, back pain, myalgias and neck pain. Negative for gait problem, joint swelling and neck stiffness.   Skin:  Negative for rash.   Neurological:  Negative for dizziness, seizures, weakness, numbness and headaches.   Psychiatric/Behavioral:  Negative for confusion, decreased concentration and sleep disturbance.        Video Exam    There were no vitals filed for this visit.    Physical Exam     Visit Time  Total Visit Duration: 30 minutes

## 2024-05-23 NOTE — ASSESSMENT & PLAN NOTE
History of positive rheumatoid factor.  Recent labs revealed negative rheumatoid factor.  She has no signs of any inflammatory arthritis at this time.  We will continue to monitor.

## 2024-05-23 NOTE — ASSESSMENT & PLAN NOTE
Generalized myofascial pain which is stable to improved.  She is using cyclobenzaprine as needed as well as meloxicam which helps with her pain.  She does note that her symptoms are most pronounced with weather changes and rainy days.  Encouraged regular home exercise program.  Labs as ordered to monitor for medication side effects and toxicity.  Follow-up in 6 months or sooner if needed.

## 2024-05-28 ENCOUNTER — LAB (OUTPATIENT)
Dept: LAB | Facility: CLINIC | Age: 24
End: 2024-05-28
Payer: COMMERCIAL

## 2024-05-28 ENCOUNTER — TELEPHONE (OUTPATIENT)
Age: 24
End: 2024-05-28

## 2024-05-28 DIAGNOSIS — N39.0 URINARY TRACT INFECTION WITHOUT HEMATURIA, SITE UNSPECIFIED: ICD-10-CM

## 2024-05-28 DIAGNOSIS — N39.0 URINARY TRACT INFECTION WITHOUT HEMATURIA, SITE UNSPECIFIED: Primary | ICD-10-CM

## 2024-05-28 LAB
BACTERIA UR QL AUTO: ABNORMAL /HPF
BILIRUB UR QL STRIP: NEGATIVE
CLARITY UR: CLEAR
COLOR UR: COLORLESS
GLUCOSE UR STRIP-MCNC: NEGATIVE MG/DL
HGB UR QL STRIP.AUTO: ABNORMAL
KETONES UR STRIP-MCNC: NEGATIVE MG/DL
LEUKOCYTE ESTERASE UR QL STRIP: NEGATIVE
NITRITE UR QL STRIP: NEGATIVE
NON-SQ EPI CELLS URNS QL MICRO: ABNORMAL /HPF
PH UR STRIP.AUTO: 8 [PH]
PROT UR STRIP-MCNC: NEGATIVE MG/DL
RBC #/AREA URNS AUTO: ABNORMAL /HPF
SP GR UR STRIP.AUTO: 1.01 (ref 1–1.03)
UROBILINOGEN UR STRIP-ACNC: <2 MG/DL
WBC #/AREA URNS AUTO: ABNORMAL /HPF

## 2024-05-28 PROCEDURE — 81001 URINALYSIS AUTO W/SCOPE: CPT

## 2024-05-28 PROCEDURE — 87086 URINE CULTURE/COLONY COUNT: CPT

## 2024-05-28 NOTE — TELEPHONE ENCOUNTER
Done.
Patient is aware. Thank you   
Patient states that she is still having symptoms of a UTI after taking the medication and is requesting an order for a urine culture. Please advise   
done

## 2024-05-29 LAB — BACTERIA UR CULT: NORMAL

## 2024-06-03 ENCOUNTER — OFFICE VISIT (OUTPATIENT)
Dept: OBGYN CLINIC | Facility: CLINIC | Age: 24
End: 2024-06-03
Payer: COMMERCIAL

## 2024-06-03 VITALS — SYSTOLIC BLOOD PRESSURE: 118 MMHG | DIASTOLIC BLOOD PRESSURE: 68 MMHG | BODY MASS INDEX: 22.56 KG/M2 | WEIGHT: 119.4 LBS

## 2024-06-03 DIAGNOSIS — N90.89 VULVAR IRRITATION: ICD-10-CM

## 2024-06-03 DIAGNOSIS — Z86.19 HISTORY OF HERPES GENITALIS: ICD-10-CM

## 2024-06-03 DIAGNOSIS — Z11.3 SCREENING FOR STDS (SEXUALLY TRANSMITTED DISEASES): ICD-10-CM

## 2024-06-03 DIAGNOSIS — B37.9 YEAST INFECTION: Primary | ICD-10-CM

## 2024-06-03 LAB
BV WHIFF TEST VAG QL: NEGATIVE
CLUE CELLS SPEC QL WET PREP: NEGATIVE
PH SMN: 4 [PH]
T VAGINALIS VAG QL WET PREP: NEGATIVE
YEAST VAG QL WET PREP: POSITIVE

## 2024-06-03 PROCEDURE — 87591 N.GONORRHOEAE DNA AMP PROB: CPT | Performed by: PHYSICIAN ASSISTANT

## 2024-06-03 PROCEDURE — 87661 TRICHOMONAS VAGINALIS AMPLIF: CPT | Performed by: PHYSICIAN ASSISTANT

## 2024-06-03 PROCEDURE — 87491 CHLMYD TRACH DNA AMP PROBE: CPT | Performed by: PHYSICIAN ASSISTANT

## 2024-06-03 PROCEDURE — 87210 SMEAR WET MOUNT SALINE/INK: CPT | Performed by: PHYSICIAN ASSISTANT

## 2024-06-03 PROCEDURE — 99214 OFFICE O/P EST MOD 30 MIN: CPT | Performed by: PHYSICIAN ASSISTANT

## 2024-06-03 RX ORDER — NORETHINDRONE ACETATE AND ETHINYL ESTRADIOL 1MG-20(24)
1 KIT ORAL DAILY
COMMUNITY
Start: 2024-05-06 | End: 2024-06-03

## 2024-06-03 RX ORDER — NYSTATIN AND TRIAMCINOLONE ACETONIDE 100000; 1 [USP'U]/G; MG/G
OINTMENT TOPICAL 2 TIMES DAILY
Qty: 30 G | Refills: 0 | Status: SHIPPED | OUTPATIENT
Start: 2024-06-03

## 2024-06-03 RX ORDER — FLUCONAZOLE 150 MG/1
TABLET ORAL
Qty: 2 TABLET | Refills: 0 | Status: SHIPPED | OUTPATIENT
Start: 2024-06-03 | End: 2024-06-07

## 2024-06-03 NOTE — PROGRESS NOTES
"Russ Fajardo  2000      CC:  \"yeast infection\" x 2 weeks     S:  24 y.o. female with c/o yeast infection x 2 weeks. Sx started about 1 week after she completed a 2 week long course of amoxicillin. She reports vulvar and vaginal itching and irritation. No discharge currently, but is completing menses. LMP 5/26/24. Taking COCP w/o complaint. Some dysuria but no pelvic pain, odor, urinary urgency/frequency sx. Has UA/UC completed 5 days ago, which was negative. No F/C/S or change in sx since. She is sexually active with a single male partner .   She does request STD testing today for GC/CT and trich but declines blood work.      Current Outpatient Medications:     cyclobenzaprine (FLEXERIL) 5 mg tablet, Take 1 tablet (5 mg total) by mouth 2 (two) times a day as needed for muscle spasms, Disp: 180 tablet, Rfl: 1    famotidine (PEPCID) 20 mg tablet, TAKE 1 TABLET BY MOUTH TWICE A DAY, Disp: 180 tablet, Rfl: 1    fluconazole (DIFLUCAN) 150 mg tablet, Take one tablet now, then one tablet in 3 days., Disp: 2 tablet, Rfl: 0    meloxicam (Mobic) 7.5 mg tablet, Take 1 tablet (7.5 mg total) by mouth 2 (two) times a day as needed for moderate pain, Disp: 180 tablet, Rfl: 1    methimazole (TAPAZOLE) 5 mg tablet, TAKE 2 TABLETS BY MOUTH EVERY DAY, Disp: 180 tablet, Rfl: 1    norethindrone-ethinyl estradiol (Loestrin Fe 1/20) 1-20 MG-MCG per tablet, Take 1 tablet by mouth daily, Disp: 84 tablet, Rfl: 2    nystatin-triamcinolone (MYCOLOG-II) ointment, Apply topically 2 (two) times a day For up to 2 weeks, as needed for itching/irritation., Disp: 30 g, Rfl: 0    omeprazole (PriLOSEC) 40 MG capsule, TAKE 1 CAPSULE (40 MG TOTAL) BY MOUTH DAILY., Disp: 90 capsule, Rfl: 1    propranolol (INDERAL) 40 mg tablet, Take 1 tablet (40 mg total) by mouth 2 (two) times a day, Disp: 180 tablet, Rfl: 1    Sodium Fluoride 5000 PPM 1.1 % PSTE, 2 (two) times a day, Disp: , Rfl:     sucralfate (CARAFATE) 1 g tablet, TAKE 1 TABLET (1 G TOTAL) " BY MOUTH 4 (FOUR) TIMES A DAY AS NEEDED (ABDOMINAL PAIN), Disp: 360 tablet, Rfl: 1    valACYclovir (VALTREX) 1,000 mg tablet, Take 1 tablet (1,000 mg total) by mouth daily for 5 days, Disp: 5 tablet, Rfl: 5  Social History     Socioeconomic History    Marital status: Single     Spouse name: Not on file    Number of children: Not on file    Years of education: Not on file    Highest education level: Not on file   Occupational History    Occupation: [employer]   Tobacco Use    Smoking status: Never    Smokeless tobacco: Never   Vaping Use    Vaping status: Never Used   Substance and Sexual Activity    Alcohol use: Yes     Comment: socially    Drug use: No    Sexual activity: Yes     Partners: Male     Birth control/protection: Condom Male, Pill   Other Topics Concern    Not on file   Social History Narrative    Not on file     Social Determinants of Health     Financial Resource Strain: Low Risk  (3/17/2023)    Overall Financial Resource Strain (CARDIA)     Difficulty of Paying Living Expenses: Not hard at all   Food Insecurity: No Food Insecurity (3/17/2023)    Hunger Vital Sign     Worried About Running Out of Food in the Last Year: Never true     Ran Out of Food in the Last Year: Never true   Transportation Needs: No Transportation Needs (3/17/2023)    PRAPARE - Transportation     Lack of Transportation (Medical): No     Lack of Transportation (Non-Medical): No   Physical Activity: Sufficiently Active (4/7/2022)    Exercise Vital Sign     Days of Exercise per Week: 3 days     Minutes of Exercise per Session: 60 min   Stress: No Stress Concern Present (4/7/2022)    [place] [employer] - Occupational Stress Questionnaire     Feeling of Stress : Not at all   Social Connections: Not on file   Intimate Partner Violence: Not At Risk (8/17/2022)    Humiliation, Afraid, Rape, and Kick questionnaire     Fear of Current or Ex-Partner: No     Emotionally Abused: No     Physically Abused: No     Sexually  Abused: No   Housing Stability: Low Risk  (3/17/2023)    Housing Stability Vital Sign     Unable to Pay for Housing in the Last Year: No     Number of Places Lived in the Last Year: 2     Unstable Housing in the Last Year: No     Family History   Problem Relation Age of Onset    No Known Problems Mother     No Known Problems Father     Crohn's disease Brother     Cancer Maternal Grandmother     Diabetes Maternal Grandmother     Heart disease Maternal Grandmother     Hypertension Maternal Grandmother     Breast cancer Maternal Grandmother     Cancer Paternal Grandmother     Cancer Other     Cancer Other     Cancer Cousin     Asthma Sister      Past Medical History:   Diagnosis Date    Constipation     Hyperthyroidism     Pilonidal cyst     Wears glasses        ROS:  Constitutional: Negative.  Gastrointestinal: Negative for abdominal pain nausea, vomiting, and change in bowel habits.  Genitourinary:  + itching and irriation. Negative for difficulty urinating, frequency, urgency, pelvic pain, vaginal odor or discharge.    O:  Blood pressure 118/68, weight 54.2 kg (119 lb 6.4 oz), last menstrual period 05/26/2024, not currently breastfeeding.    Patient appears well and is not in distress  Normocephalic, atraumatic.   Abdomen is soft and nontender without masses.   External genitals mildly erythematous w/o lesion/rash  Vagina with scan menstrual blood. No discharge.   Cervix is normal without discharge or lesion. No CMT.  Uterus is normal, mobile, nontender without palpable mass.  Adnexa are normal, nontender, without palpable mass.   No focal neurological deficits.   Normal mood, affect, and behavior.     A/P  Diagnoses and all orders for this visit:    Yeast infection  -     fluconazole (DIFLUCAN) 150 mg tablet; Take one tablet now, then one tablet in 3 days.  -     nystatin-triamcinolone (MYCOLOG-II) ointment; Apply topically 2 (two) times a day For up to 2 weeks, as needed for itching/irritation.    Vulvar  irritation  -     POCT wet mount    Screening for STDs (sexually transmitted diseases)    - Chlamydia/GC amplified DNA by PCR  - Trichomonas vaginalis Thin prep      Reviewed findings of yeast on wet mount  Rx for diflucan and mycolog II sent to pharmacy on file. Use reviewed.  Will notify with result of STD screening   She will call in one week if she is not feeling completely better.

## 2024-06-04 RX ORDER — VALACYCLOVIR HYDROCHLORIDE 1 G/1
1000 TABLET, FILM COATED ORAL DAILY
Qty: 5 TABLET | Refills: 5 | Status: SHIPPED | OUTPATIENT
Start: 2024-06-04 | End: 2024-06-09

## 2024-06-05 LAB
C TRACH DNA SPEC QL NAA+PROBE: NEGATIVE
N GONORRHOEA DNA SPEC QL NAA+PROBE: NEGATIVE
T VAGINALIS DNA SPEC QL NAA+PROBE: NEGATIVE

## 2024-06-06 ENCOUNTER — PATIENT MESSAGE (OUTPATIENT)
Dept: OBGYN CLINIC | Facility: CLINIC | Age: 24
End: 2024-06-06

## 2024-06-06 DIAGNOSIS — R39.9 UTI SYMPTOMS: Primary | ICD-10-CM

## 2024-06-06 NOTE — PATIENT COMMUNICATION
Please have go for UA/UC - order placed.   Can offer OV to evaluate vulvar to ensure no evolving rash or change.

## 2024-06-19 ENCOUNTER — OFFICE VISIT (OUTPATIENT)
Dept: OBGYN CLINIC | Facility: CLINIC | Age: 24
End: 2024-06-19
Payer: COMMERCIAL

## 2024-06-19 VITALS — WEIGHT: 118.4 LBS | SYSTOLIC BLOOD PRESSURE: 110 MMHG | BODY MASS INDEX: 22.37 KG/M2 | DIASTOLIC BLOOD PRESSURE: 76 MMHG

## 2024-06-19 DIAGNOSIS — B37.9 YEAST INFECTION: Primary | ICD-10-CM

## 2024-06-19 LAB
BV WHIFF TEST VAG QL: NORMAL
CLUE CELLS SPEC QL WET PREP: NORMAL
PH SMN: 4 [PH]
T VAGINALIS VAG QL WET PREP: NORMAL
YEAST VAG QL WET PREP: POSITIVE

## 2024-06-19 PROCEDURE — 99213 OFFICE O/P EST LOW 20 MIN: CPT | Performed by: PHYSICIAN ASSISTANT

## 2024-06-19 PROCEDURE — 87210 SMEAR WET MOUNT SALINE/INK: CPT | Performed by: PHYSICIAN ASSISTANT

## 2024-06-19 RX ORDER — NORETHINDRONE ACETATE AND ETHINYL ESTRADIOL 1MG-20(24)
1 KIT ORAL DAILY
COMMUNITY
Start: 2024-06-07

## 2024-06-19 RX ORDER — FLUCONAZOLE 150 MG/1
TABLET ORAL
Qty: 2 TABLET | Refills: 0 | Status: SHIPPED | OUTPATIENT
Start: 2024-06-19 | End: 2024-06-23

## 2024-06-19 NOTE — PROGRESS NOTES
Russ Fajardo  2000      S:  24 y.o. female here for evaluation.  She reports recurrence of vulvar itching and irritation. Seen and tx for yeast infection 6/3/2024. Sx fully resolved after taking the second dose of diflucan. She was subsequently tx for a UTI after developing low back pain. Completed 5 days of amoxicillin then stopped due to recurrence of vulvar irritation.     She reports stinging and burning following IC with her partner last week. She did take plan B following this encounter as she lapsed on her birth control with recent visit to the [place]. LMP 6/15/2024.       Current Outpatient Medications:     Blisovi 24 Fe 1-20 MG-MCG(24) per tablet, Take 1 tablet by mouth daily, Disp: , Rfl:     cyclobenzaprine (FLEXERIL) 5 mg tablet, Take 1 tablet (5 mg total) by mouth 2 (two) times a day as needed for muscle spasms, Disp: 180 tablet, Rfl: 1    famotidine (PEPCID) 20 mg tablet, TAKE 1 TABLET BY MOUTH TWICE A DAY, Disp: 180 tablet, Rfl: 1    fluconazole (DIFLUCAN) 150 mg tablet, Take one tablet now, then one tablet in 3 days., Disp: 2 tablet, Rfl: 0    meloxicam (Mobic) 7.5 mg tablet, Take 1 tablet (7.5 mg total) by mouth 2 (two) times a day as needed for moderate pain, Disp: 180 tablet, Rfl: 1    methimazole (TAPAZOLE) 5 mg tablet, TAKE 2 TABLETS BY MOUTH EVERY DAY, Disp: 180 tablet, Rfl: 1    omeprazole (PriLOSEC) 40 MG capsule, TAKE 1 CAPSULE (40 MG TOTAL) BY MOUTH DAILY., Disp: 90 capsule, Rfl: 1    propranolol (INDERAL) 40 mg tablet, Take 1 tablet (40 mg total) by mouth 2 (two) times a day, Disp: 180 tablet, Rfl: 1    sucralfate (CARAFATE) 1 g tablet, TAKE 1 TABLET (1 G TOTAL) BY MOUTH 4 (FOUR) TIMES A DAY AS NEEDED (ABDOMINAL PAIN), Disp: 360 tablet, Rfl: 1    valACYclovir (VALTREX) 1,000 mg tablet, Take 1 tablet (1,000 mg total) by mouth daily for 5 days, Disp: 5 tablet, Rfl: 5    norethindrone-ethinyl estradiol (Loestrin Fe 1/20) 1-20 MG-MCG per tablet, Take 1 tablet by mouth  daily (Patient not taking: Reported on 6/19/2024), Disp: 84 tablet, Rfl: 2    nystatin-triamcinolone (MYCOLOG-II) ointment, Apply topically 2 (two) times a day For up to 2 weeks, as needed for itching/irritation. (Patient not taking: Reported on 6/19/2024), Disp: 30 g, Rfl: 0    Sodium Fluoride 5000 PPM 1.1 % PSTE, 2 (two) times a day (Patient not taking: Reported on 6/19/2024), Disp: , Rfl:   Social History     Socioeconomic History    Marital status: Single     Spouse name: Not on file    Number of children: Not on file    Years of education: Not on file    Highest education level: Not on file   Occupational History    Occupation: Prevently   Tobacco Use    Smoking status: Never    Smokeless tobacco: Never   Vaping Use    Vaping status: Never Used   Substance and Sexual Activity    Alcohol use: Yes     Comment: socially    Drug use: No    Sexual activity: Yes     Partners: Male     Birth control/protection: Condom Male, Pill   Other Topics Concern    Not on file   Social History Narrative    Not on file     Social Determinants of Health     Financial Resource Strain: Low Risk  (3/17/2023)    Overall Financial Resource Strain (CARDIA)     Difficulty of Paying Living Expenses: Not hard at all   Food Insecurity: No Food Insecurity (3/17/2023)    Hunger Vital Sign     Worried About Running Out of Food in the Last Year: Never true     Ran Out of Food in the Last Year: Never true   Transportation Needs: No Transportation Needs (3/17/2023)    PRAPARE - Transportation     Lack of Transportation (Medical): No     Lack of Transportation (Non-Medical): No   Physical Activity: Sufficiently Active (4/7/2022)    Exercise Vital Sign     Days of Exercise per Week: 3 days     Minutes of Exercise per Session: 60 min   Stress: No Stress Concern Present (4/7/2022)    [employer] - Occupational Stress Questionnaire     Feeling of Stress : Not at all   Social Connections: Not on file   Intimate Partner  Violence: Not At Risk (8/17/2022)    Humiliation, Afraid, Rape, and Kick questionnaire     Fear of Current or Ex-Partner: No     Emotionally Abused: No     Physically Abused: No     Sexually Abused: No   Housing Stability: Low Risk  (3/17/2023)    Housing Stability Vital Sign     Unable to Pay for Housing in the Last Year: No     Number of Places Lived in the Last Year: 2     Unstable Housing in the Last Year: No     Family History   Problem Relation Age of Onset    No Known Problems Mother     No Known Problems Father     Crohn's disease Brother     Cancer Maternal Grandmother     Diabetes Maternal Grandmother     Heart disease Maternal Grandmother     Hypertension Maternal Grandmother     Breast cancer Maternal Grandmother     Cancer Paternal Grandmother     Cancer Other     Cancer Other     Cancer Cousin     Asthma Sister      Past Medical History:   Diagnosis Date    Constipation     Hyperthyroidism     Pilonidal cyst     Wears glasses        ROS:  Constitutional: Negative.  Gastrointestinal: Negative for abdominal pain nausea, vomiting, and change in bowel habits.  Genitourinary:  + vulvovaginal itching and irritation. Negative for difficulty urinating, frequency, urgency, pelvic pain, vaginal odor.    O:  Blood pressure 110/76, weight 53.7 kg (118 lb 6.4 oz), last menstrual period 06/15/2024, not currently breastfeeding.    Patient appears well and is not in distress  Normocephalic, atraumatic.   Abdomen is soft and nontender without masses.   External genitals - erythema surrounding vagianl opening   Vagina with + thick white vaginal discharge. No bleeding.   Cervix is normal without discharge or lesion. No CMT.  Uterus is normal, mobile, nontender without palpable mass.  Adnexa are normal, nontender, without palpable mass.   No focal neurological deficits.   Normal mood, affect, and behavior.     A/P  Diagnoses and all orders for this visit:    Yeast infection    Reviewed findings of yeast on wet mount  Rx  for diflucan sent to pharmacy on file. Use reviewed.  Vaseline as barrier to allow for vulvar healing   We had a discussion regarding vulvar hygiene:   No soaps or feminine wash to the vulva with the exception of Dove Sensitive Skin bar soap if necessary.    Only perfume-free, dye-free laundry detergent, use a second rinse cycle  Avoid fabric softeners/dryer sheets.   No lotion to the area.    Coconut oil as a lubricant (if not using condoms) or another scent-free lubricant (Astroglide, Uberlube) if needed.    Partner to avoid the same products as well.     She will call in one week if she is not feeling completely better.

## 2024-07-18 ENCOUNTER — PATIENT MESSAGE (OUTPATIENT)
Dept: OBGYN CLINIC | Facility: CLINIC | Age: 24
End: 2024-07-18

## 2024-07-18 DIAGNOSIS — Z86.19 HISTORY OF HERPES GENITALIS: ICD-10-CM

## 2024-07-18 RX ORDER — VALACYCLOVIR HYDROCHLORIDE 1 G/1
1000 TABLET, FILM COATED ORAL DAILY
Qty: 5 TABLET | Refills: 5 | Status: SHIPPED | OUTPATIENT
Start: 2024-07-18 | End: 2024-07-23

## 2024-07-25 ENCOUNTER — OFFICE VISIT (OUTPATIENT)
Dept: GASTROENTEROLOGY | Facility: CLINIC | Age: 24
End: 2024-07-25
Payer: COMMERCIAL

## 2024-07-25 VITALS
SYSTOLIC BLOOD PRESSURE: 110 MMHG | TEMPERATURE: 98.3 F | HEIGHT: 61 IN | DIASTOLIC BLOOD PRESSURE: 70 MMHG | BODY MASS INDEX: 22.13 KG/M2 | WEIGHT: 117.2 LBS

## 2024-07-25 DIAGNOSIS — K59.00 CONSTIPATION, UNSPECIFIED CONSTIPATION TYPE: ICD-10-CM

## 2024-07-25 DIAGNOSIS — R68.81 EARLY SATIETY: Primary | ICD-10-CM

## 2024-07-25 DIAGNOSIS — R11.0 NAUSEA: ICD-10-CM

## 2024-07-25 DIAGNOSIS — R10.13 DYSPEPSIA: ICD-10-CM

## 2024-07-25 DIAGNOSIS — K29.70 GASTRITIS WITHOUT BLEEDING, UNSPECIFIED CHRONICITY, UNSPECIFIED GASTRITIS TYPE: ICD-10-CM

## 2024-07-25 PROCEDURE — 99214 OFFICE O/P EST MOD 30 MIN: CPT | Performed by: PHYSICIAN ASSISTANT

## 2024-07-25 RX ORDER — OMEPRAZOLE 40 MG/1
40 CAPSULE, DELAYED RELEASE ORAL DAILY
Qty: 90 CAPSULE | Refills: 1 | Status: SHIPPED | OUTPATIENT
Start: 2024-07-25

## 2024-07-25 RX ORDER — POLYETHYLENE GLYCOL 3350 17 G/17G
17 POWDER, FOR SOLUTION ORAL DAILY
Qty: 100 EACH | Refills: 2 | Status: SHIPPED | OUTPATIENT
Start: 2024-07-25

## 2024-07-25 RX ORDER — ONDANSETRON 4 MG/1
4 TABLET, FILM COATED ORAL EVERY 8 HOURS PRN
Qty: 30 TABLET | Refills: 2 | Status: SHIPPED | OUTPATIENT
Start: 2024-07-25

## 2024-07-25 NOTE — PROGRESS NOTES
"[facility] - Outpatient Follow-up Note  Russ Fajardo 24 y.o. female MRN: [id number]  Encounter: [id number]          ASSESSMENT AND PLAN:      1. Early satiety  2. Dyspepsia   3. Chronic constipation   She says she continues with the same symptoms of upper abdominal pain, nausea and early fullness. She says this occurs almost daily and it \"does not matter\" what she eats. She says she moves her bowels 2-3 times/week and has been this way since she was young. She denies vomiting.  Pt underwent EGD and colonoscopy for these symptoms last summer 6/2023, with EGD noting mild gastritis and the colonoscopy WNL. Gastric bx revealed H.pylori infection and she was treated with prevpac with eradication noted on stool study.   -explained to pt that her constipation could cause her symptoms so I would like to get better control of this to see if this helps but in the meantime, we should proceed with GES to rule out gastroparesis   -GES ordered  -ensure daily water intake of 64 ounces at least   -start miralax daily PRN constipation   -re-start omeprazole 40 mg daily   -anti-gerd diet      ______________________________________________________________________    SUBJECTIVE:  Russ is a 25 y/o female who presents for follow-up. She says she continues with the same symptoms of upper abdominal pain, nausea and early fullness. She says this occurs almost daily and it \"does not matter\" what she eats. She denies vomiting.  She says she moves her bowels 2-3 times/week and has been this way since she was young. She denies unintentional weight loss, fevers, chills, night sweats, NSAID use, diarrhea, bloating, bloody or black BM. Pt denies tobacco use and says she drinks alcohol once in a while.       REVIEW OF SYSTEMS IS OTHERWISE NEGATIVE.      Historical Information   Past Medical History:   Diagnosis Date    Constipation     Hyperthyroidism     Pilonidal cyst     Wears glasses      Past Surgical " History:   Procedure Laterality Date    INCISION AND DRAINAGE ABSCESS ANAL      Pilonidal Cyst  4 times-cut & drained in office-reoccurred    HI EXCISION PILONIDAL CYST/SINUS COMPLICATED N/A 5/23/2019    Procedure: EXCISION PILONIDAL CYST;  Surgeon: Paola Chavarria MD;  Location: [facility];  Service: General    HI EXCISION PILONIDAL CYST/SINUS SIMPLE N/A 4/5/2018    Procedure: EXCISION PILONIDAL CYST and debriding;  Surgeon: Paola Chavarria MD;  Location: [facility];  Service: General     Social History   Social History     Substance and Sexual Activity   Alcohol Use Yes    Comment: socially     Social History     Substance and Sexual Activity   Drug Use No     Social History     Tobacco Use   Smoking Status Never   Smokeless Tobacco Never     Family History   Problem Relation Age of Onset    No Known Problems Mother     No Known Problems Father     Crohn's disease Brother     Cancer Maternal Grandmother     Diabetes Maternal Grandmother     Heart disease Maternal Grandmother     Hypertension Maternal Grandmother     Breast cancer Maternal Grandmother     Cancer Paternal Grandmother     Cancer Other     Cancer Other     Cancer Cousin     Asthma Sister        Meds/Allergies       Current Outpatient Medications:     Blisovi 24 Fe 1-20 MG-MCG(24) per tablet    cyclobenzaprine (FLEXERIL) 5 mg tablet    famotidine (PEPCID) 20 mg tablet    meloxicam (Mobic) 7.5 mg tablet    methimazole (TAPAZOLE) 5 mg tablet    omeprazole (PriLOSEC) 40 MG capsule    ondansetron (ZOFRAN) 4 mg tablet    polyethylene glycol (MIRALAX) 17 g packet    propranolol (INDERAL) 40 mg tablet    sucralfate (CARAFATE) 1 g tablet    norethindrone-ethinyl estradiol (Loestrin Fe 1/20) 1-20 MG-MCG per tablet    nystatin-triamcinolone (MYCOLOG-II) ointment    Sodium Fluoride 5000 PPM 1.1 % PSTE    valACYclovir (VALTREX) 1,000 mg tablet    Allergies   Allergen Reactions    Sulfa Antibiotics Hives           Objective     Blood pressure 110/70, temperature  "98.3 °F (36.8 °C), temperature source Tympanic, height 5' 1\" (1.549 m), weight 53.2 kg (117 lb 3.2 oz), not currently breastfeeding. Body mass index is 22.14 kg/m².      PHYSICAL EXAM:      General Appearance:   Alert, cooperative, no distress   HEENT:   Normocephalic, atraumatic, anicteric.     Neck:  Supple, symmetrical, trachea midline   Lungs:   Clear to auscultation bilaterally; no rales, rhonchi or wheezing; respirations unlabored    Heart::   Regular rate and rhythm; no murmur, rub, or gallop.   Abdomen:   Soft, non-tender, non-distended; normal bowel sounds; no masses, no organomegaly    Genitalia:   Deferred    Rectal:   Deferred    Extremities:  No cyanosis, clubbing or edema    Pulses:  2+ and symmetric    Skin:  No jaundice, rashes, or lesions    Lymph nodes:  No palpable cervical lymphadenopathy        Lab Results:   No visits with results within 1 Day(s) from this visit.   Latest known visit with results is:   Office Visit on 06/19/2024   Component Date Value    Yeast, Wet Prep 06/19/2024 Positive     pH 06/19/2024 4     Whiff Test 06/19/2024 Neg     Clue Cells 06/19/2024 Neg     Trich, Wet Prep 06/19/2024 Neg          Radiology Results:   No results found.   "

## 2024-08-07 ENCOUNTER — PATIENT MESSAGE (OUTPATIENT)
Age: 24
End: 2024-08-07

## 2024-08-07 DIAGNOSIS — M54.2 CERVICAL SPINE PAIN: ICD-10-CM

## 2024-08-07 DIAGNOSIS — M79.18 MYOFASCIAL PAIN SYNDROME: Primary | ICD-10-CM

## 2024-08-21 ENCOUNTER — TELEPHONE (OUTPATIENT)
Age: 24
End: 2024-08-21

## 2024-08-21 NOTE — TELEPHONE ENCOUNTER
LMOM to notify patient we are not par with [facility] and appts will have to be self pay. Estimated price is 75.50 for consultation and f/u visits 35.00. Left call back number for any questions or concerns.

## 2024-09-23 ENCOUNTER — PATIENT MESSAGE (OUTPATIENT)
Age: 24
End: 2024-09-23

## 2024-09-23 DIAGNOSIS — M79.18 MYOFASCIAL PAIN SYNDROME: Primary | ICD-10-CM

## 2024-09-24 RX ORDER — GABAPENTIN 100 MG/1
CAPSULE ORAL
Qty: 90 CAPSULE | Refills: 2 | Status: SHIPPED | OUTPATIENT
Start: 2024-09-24

## 2024-10-05 ENCOUNTER — APPOINTMENT (OUTPATIENT)
Dept: LAB | Facility: CLINIC | Age: 24
End: 2024-10-05
Payer: COMMERCIAL

## 2024-10-05 DIAGNOSIS — E61.1 IRON DEFICIENCY: ICD-10-CM

## 2024-10-05 DIAGNOSIS — E05.00 GRAVES DISEASE: ICD-10-CM

## 2024-10-05 DIAGNOSIS — R39.9 UTI SYMPTOMS: ICD-10-CM

## 2024-10-05 LAB
FERRITIN SERPL-MCNC: 10 NG/ML (ref 11–307)
IRON SATN MFR SERPL: 11 % (ref 15–50)
IRON SERPL-MCNC: 42 UG/DL (ref 50–212)
T4 FREE SERPL-MCNC: 0.99 NG/DL (ref 0.61–1.12)
TIBC SERPL-MCNC: 388 UG/DL (ref 250–450)
TSH SERPL DL<=0.05 MIU/L-ACNC: 0.51 UIU/ML (ref 0.45–4.5)
UIBC SERPL-MCNC: 346 UG/DL (ref 155–355)

## 2024-10-05 PROCEDURE — 84443 ASSAY THYROID STIM HORMONE: CPT

## 2024-10-05 PROCEDURE — 82728 ASSAY OF FERRITIN: CPT

## 2024-10-05 PROCEDURE — 83540 ASSAY OF IRON: CPT

## 2024-10-05 PROCEDURE — 84439 ASSAY OF FREE THYROXINE: CPT

## 2024-10-05 PROCEDURE — 83550 IRON BINDING TEST: CPT

## 2024-10-08 DIAGNOSIS — E05.00 GRAVES DISEASE: ICD-10-CM

## 2024-10-08 RX ORDER — PROPRANOLOL HYDROCHLORIDE 40 MG/1
40 TABLET ORAL 2 TIMES DAILY
Qty: 60 TABLET | Refills: 0 | Status: SHIPPED | OUTPATIENT
Start: 2024-10-08

## 2024-10-10 ENCOUNTER — PATIENT MESSAGE (OUTPATIENT)
Age: 24
End: 2024-10-10

## 2024-10-10 DIAGNOSIS — M79.18 MYOFASCIAL PAIN SYNDROME: Primary | ICD-10-CM

## 2024-10-10 RX ORDER — PREGABALIN 50 MG/1
50 CAPSULE ORAL DAILY
Qty: 30 CAPSULE | Refills: 2 | Status: SHIPPED | OUTPATIENT
Start: 2024-10-10 | End: 2025-01-08

## 2024-10-11 ENCOUNTER — OFFICE VISIT (OUTPATIENT)
Dept: ENDOCRINOLOGY | Facility: CLINIC | Age: 24
End: 2024-10-11
Payer: COMMERCIAL

## 2024-10-11 VITALS
HEIGHT: 61 IN | WEIGHT: 120.2 LBS | DIASTOLIC BLOOD PRESSURE: 88 MMHG | BODY MASS INDEX: 22.69 KG/M2 | SYSTOLIC BLOOD PRESSURE: 128 MMHG

## 2024-10-11 DIAGNOSIS — E05.90 HYPERTHYROIDISM: ICD-10-CM

## 2024-10-11 PROCEDURE — 99214 OFFICE O/P EST MOD 30 MIN: CPT | Performed by: INTERNAL MEDICINE

## 2024-10-11 RX ORDER — METHIMAZOLE 5 MG/1
TABLET ORAL
Qty: 180 TABLET | Refills: 1 | Status: SHIPPED | OUTPATIENT
Start: 2024-10-11

## 2024-10-11 NOTE — PROGRESS NOTES
10/11/2024    Assessment & Plan      Diagnoses and all orders for this visit:    Hyperthyroidism  -     methimazole (TAPAZOLE) 5 mg tablet; TAKE 2 TABLETS BY MOUTH EVERY DAY  -     TSH, 3rd generation; Future  -     T3, free; Future  -     T4, free; Future  -     Thyroid stimulating immunoglobulin; Future        Assessment/Plan:  1.  Hypothyroidism: Overall doing well on current regimen.  We will continue methimazole 10 mg daily.  Repeat labs prior to next point which would be in 3-4 months.  Asked her to let me know if in the future there are plans for pregnancy as I would recommend switching to PTU at that time and we discussed the rationale with regard to the first trimester.  She will contact me sooner with any questions or concerns.    2.  Iron deficiency: She does have constipation.  I asked her to consider discussing with her primary  doctor regarding supplementation or IV iron infusions.      CC: Follow-up    History of Present Illness     HPI: Russ Fajardo is a 24 y.o. year old female with history of hyperthyroidism due to Graves' disease who presents for a follow-up appointment.  She continues on methimazole 10 mg daily.  PTU was discontinued previously due to poor taste.  She is tolerating methimazole well.  She presents today overall feeling well from a thyroid standpoint.  Notes energy overall is doing well.  Denies any palpitations, tremor, shakiness.  She continues to work closely with rheumatology for joint pains.    Review of Systems   Constitutional:  Negative for fatigue.   HENT:  Negative for trouble swallowing and voice change.    Eyes:  Negative for visual disturbance.   Respiratory:  Negative for shortness of breath.    Cardiovascular:  Negative for palpitations and leg swelling.   Gastrointestinal:  Negative for abdominal pain, nausea and vomiting.   Endocrine: Negative for polydipsia and polyuria.   Musculoskeletal:  Negative for arthralgias and myalgias.   Skin:  Negative for rash.    Neurological:  Negative for dizziness, tremors and weakness.   Hematological:  Negative for adenopathy.   Psychiatric/Behavioral:  Negative for agitation and confusion.        Historical Information   Past Medical History:   Diagnosis Date    Constipation     Hyperthyroidism     Pilonidal cyst     Wears glasses      Past Surgical History:   Procedure Laterality Date    INCISION AND DRAINAGE ABSCESS ANAL      Pilonidal Cyst  4 times-cut & drained in office-reoccurred    UT EXCISION PILONIDAL CYST/SINUS COMPLICATED N/A 5/23/2019    Procedure: EXCISION PILONIDAL CYST;  Surgeon: Paola Chavarria MD;  Location: [facility];  Service: General    UT EXCISION PILONIDAL CYST/SINUS SIMPLE N/A 4/5/2018    Procedure: EXCISION PILONIDAL CYST and debriding;  Surgeon: Paola Chavarria MD;  [facility];  Service: General     Social History   Social History     Substance and Sexual Activity   Alcohol Use Yes    Comment: socially     Social History     Substance and Sexual Activity   Drug Use No     Social History     Tobacco Use   Smoking Status Never    Passive exposure: Past   Smokeless Tobacco Never     Family History:   Family History   Problem Relation Age of Onset    No Known Problems Mother     No Known Problems Father     Crohn's disease Brother     Cancer Maternal Grandmother     Diabetes Maternal Grandmother     Heart disease Maternal Grandmother     Hypertension Maternal Grandmother     Breast cancer Maternal Grandmother     Cancer Paternal Grandmother     Cancer Other     Cancer Other     Cancer Cousin     Asthma Sister        Meds/Allergies   Current Outpatient Medications   Medication Sig Dispense Refill    Blisovi 24 Fe 1-20 MG-MCG(24) per tablet Take 1 tablet by mouth daily      cyclobenzaprine (FLEXERIL) 5 mg tablet Take 1 tablet (5 mg total) by mouth 2 (two) times a day as needed for muscle spasms 180 tablet 1    famotidine (PEPCID) 20 mg tablet TAKE 1 TABLET BY MOUTH TWICE A  tablet 1    meloxicam  "(Mobic) 7.5 mg tablet Take 1 tablet (7.5 mg total) by mouth 2 (two) times a day as needed for moderate pain 180 tablet 1    methimazole (TAPAZOLE) 5 mg tablet TAKE 2 TABLETS BY MOUTH EVERY  tablet 1    omeprazole (PriLOSEC) 40 MG capsule Take 1 capsule (40 mg total) by mouth daily 30 minutes before breakfast 90 capsule 1    ondansetron (ZOFRAN) 4 mg tablet Take 1 tablet (4 mg total) by mouth every 8 (eight) hours as needed for nausea or vomiting 30 tablet 2    polyethylene glycol (MIRALAX) 17 g packet Take 17 g by mouth daily As needed for constipation 100 each 2    pregabalin (LYRICA) 50 mg capsule Take 1 capsule (50 mg total) by mouth daily 30 capsule 2    propranolol (INDERAL) 40 mg tablet TAKE 1 TABLET BY MOUTH TWICE A DAY 60 tablet 0    sucralfate (CARAFATE) 1 g tablet TAKE 1 TABLET (1 G TOTAL) BY MOUTH 4 (FOUR) TIMES A DAY AS NEEDED (ABDOMINAL PAIN) 360 tablet 1    gabapentin (Neurontin) 100 mg capsule Take 1 capsule po qhs for 1 week, then 2 capsules po qhs for 1 week, then 3 capsules po qhs thereafter (Patient not taking: Reported on 10/11/2024) 90 capsule 2    norethindrone-ethinyl estradiol (Loestrin Fe 1/20) 1-20 MG-MCG per tablet Take 1 tablet by mouth daily (Patient not taking: Reported on 6/19/2024) 84 tablet 2    nystatin-triamcinolone (MYCOLOG-II) ointment Apply topically 2 (two) times a day For up to 2 weeks, as needed for itching/irritation. (Patient not taking: Reported on 6/19/2024) 30 g 0    Sodium Fluoride 5000 PPM 1.1 % PSTE 2 (two) times a day (Patient not taking: Reported on 6/19/2024)      valACYclovir (VALTREX) 1,000 mg tablet Take 1 tablet (1,000 mg total) by mouth daily for 5 days 5 tablet 5     No current facility-administered medications for this visit.     Allergies   Allergen Reactions    Sulfa Antibiotics Hives       Objective   Vitals: Blood pressure 128/88, height 5' 1\" (1.549 m), weight 54.5 kg (120 lb 3.2 oz), not currently breastfeeding.  Invasive Devices       None     "               Physical Exam  Vitals reviewed.   Constitutional:       General: She is not in acute distress.     Appearance: She is well-developed. She is not diaphoretic.   HENT:      Head: Normocephalic and atraumatic.   Eyes:      Conjunctiva/sclera: Conjunctivae normal.      Pupils: Pupils are equal, round, and reactive to light.   Neck:      Thyroid: No thyromegaly.   Cardiovascular:      Rate and Rhythm: Normal rate and regular rhythm.   Pulmonary:      Effort: Pulmonary effort is normal. No respiratory distress.      Breath sounds: Normal breath sounds.   Abdominal:      General: Bowel sounds are normal.      Palpations: Abdomen is soft.   Musculoskeletal:         General: Normal range of motion.      Cervical back: Normal range of motion and neck supple.   Skin:     General: Skin is warm and dry.      Findings: No rash.   Neurological:      Mental Status: She is alert and oriented to person, place, and time.      Motor: No abnormal muscle tone.   Psychiatric:         Behavior: Behavior normal.         The history was obtained from the review of the chart and from the patient.    Lab Results:        Component      Latest Ref [facility] 10/5/2024   WBC      4.31 - 10.16 Thousand/uL 4.04 (L)    RBC      3.81 - 5.12 Million/uL 4.39    Hemoglobin      11.5 - 15.4 g/dL 12.9    Hematocrit      34.8 - 46.1 % 39.8    MCV      82 - 98 fL 91    MCH      26.8 - 34.3 pg 29.4    MCHC      31.4 - 37.4 g/dL 32.4    RDW      11.6 - 15.1 % 12.8    MPV      8.9 - 12.7 fL 13.1 (H)    Platelet Count      149 - 390 Thousands/uL 252    nRBC      /100 WBCs 0    Segmented %      43 - 75 % 60    Immature Grans %      0 - 2 % 0    Lymphocytes %      14 - 44 % 31    Monocytes %      4 - 12 % 8    Eosinophils %      0 - 6 % 0    Basophils %      0 - 1 % 1    Absolute Neutrophils      1.85 - 7.62 Thousands/µL 2.44    Absolute Immature Grans      0.00 - 0.20 Thousand/uL 0.01    Absolute Lymphocytes      0.60 - 4.47 Thousands/µL 1.25    Absolute  "Monocytes      0.17 - 1.22 Thousand/µL 0.32    Absolute Eosinophils      0.00 - 0.61 Thousand/µL 0.00    Absolute Basophils      0.00 - 0.10 Thousands/µL 0.02    Sodium      135 - 147 mmol/L 138    Potassium      3.5 - 5.3 mmol/L 4.2    Chloride      96 - 108 mmol/L 102    Carbon Dioxide      21 - 32 mmol/L 27    ANION GAP      4 - 13 mmol/L 9    BUN      5 - 25 mg/dL 11    Creatinine      0.60 - 1.30 mg/dL 0.74    GLUCOSE, FASTING      65 - 99 mg/dL 86    Calcium      8.4 - 10.2 mg/dL 9.2    AST      13 - 39 U/L 13    ALT      7 - 52 U/L 17    ALK PHOS      34 - 104 U/L 45    Total Protein      6.4 - 8.4 g/dL 8.0    Albumin      3.5 - 5.0 g/dL 4.5    Total Bilirubin      0.20 - 1.00 mg/dL 0.34    GFR, Calculated      ml/min/1.73sq m 115    Iron Saturation      15 - 50 % 11 (L)    TIBC      250 - 450 ug/dL 388    Iron      50 - 212 ug/dL 42 (L)    UIBC      155 - 355 ug/dL 346    TSH 3RD GENERATON      0.450 - 4.500 uIU/mL 0.513    FREE T4      0.61 - 1.12 ng/dL 0.99    FERRITIN      11 - 307 ng/mL 10 (L)       Legend:  (L) Low  (H) High    Future Appointments   Date Time Provider Department Center   10/29/2024  8:00 AM AN HV NM 1 AN HV Car [facility]   11/7/2024  9:30 AM Medina Stevens PA-C [facility]   12/5/2024  9:00 AM Georgia Aguilera PA-C [facility]   1/23/2025  1:30 PM Candice Burkett PA-C [facility]       Portions of the record may have been created with voice recognition software. Occasional wrong word or \"sound a like\" substitutions may have occurred due to the inherent limitations of voice recognition software. Read the chart carefully and recognize, using context, where substitutions have occurred.    "

## 2024-10-29 ENCOUNTER — HOSPITAL ENCOUNTER (OUTPATIENT)
Dept: NON INVASIVE DIAGNOSTICS | Facility: CLINIC | Age: 24
Discharge: HOME | End: 2024-10-29
Payer: COMMERCIAL

## 2024-10-29 DIAGNOSIS — R10.13 DYSPEPSIA: ICD-10-CM

## 2024-10-29 DIAGNOSIS — R68.81 EARLY SATIETY: ICD-10-CM

## 2024-10-29 PROCEDURE — A9541 TC99M SULFUR COLLOID: HCPCS

## 2024-10-29 PROCEDURE — 78264 GASTRIC EMPTYING IMG STUDY: CPT

## 2024-11-07 ENCOUNTER — OFFICE VISIT (OUTPATIENT)
Dept: GASTROENTEROLOGY | Facility: CLINIC | Age: 24
End: 2024-11-07
Payer: COMMERCIAL

## 2024-11-07 VITALS
SYSTOLIC BLOOD PRESSURE: 102 MMHG | DIASTOLIC BLOOD PRESSURE: 60 MMHG | BODY MASS INDEX: 22.28 KG/M2 | HEIGHT: 61 IN | WEIGHT: 118 LBS | TEMPERATURE: 98.5 F

## 2024-11-07 DIAGNOSIS — K59.04 CHRONIC IDIOPATHIC CONSTIPATION: Primary | ICD-10-CM

## 2024-11-07 DIAGNOSIS — R79.0 LOW FERRITIN: ICD-10-CM

## 2024-11-07 DIAGNOSIS — K21.9 GASTROESOPHAGEAL REFLUX DISEASE WITHOUT ESOPHAGITIS: ICD-10-CM

## 2024-11-07 PROCEDURE — 99214 OFFICE O/P EST MOD 30 MIN: CPT | Performed by: PHYSICIAN ASSISTANT

## 2024-11-07 NOTE — PATIENT INSTRUCTIONS
Start taking linzess every mornin-60 minutes before breakfast   It is normal to get diarrhea for the first 2 weeks. If it lasts longer than two weeks, let us know. If you take this for 2 weeks and it does not help, give us a call

## 2024-11-07 NOTE — PROGRESS NOTES
[facility] - Outpatient Follow-up Note  Russ Fajardo 24 y.o. female MRN: [id number]  Encounter: [id number]          ASSESSMENT AND PLAN:      1. Chronic idiopathic constipation  Patient says that she is maintaining at least 8 to 10 cups of water a day and did indeed try taking MiraLAX every day for 2 to 3 weeks but explains that this only allowed her to move her bowels once a week or so.  She did try Linzess 72 mcg in the past which did not help.  - linaCLOtide 290 MCG CAPS; Take 1 capsule by mouth daily before breakfast 30-60 minutes before breakfast  Dispense: 30 capsule; Refill: 3  -If the highest dose of Linzess also does not work, would recommend Amitiza at that time    2. Low ferritin  EGD and colonoscopy last year; other than her H. pylori infection, it did not depict any etiology of GI bleeding or cause of anemia.  H. pylori stool test confirmed eradication.  She says that she does get her period 3 times a month and is seeing OB GYN for this.  She would like to see hematology to discuss IV Venofer as p.o. iron worsens her constipation.  - Ambulatory Referral to Hematology / Oncology; Future  -Without any overt GI bleeding and with her having her period 3 times a month, would hold off on capsule study for now.  Would recommend she sees hematology and OB/GYN first    3. GERD  Stable on omeprazole 40 mg daily. Ges wnl  -Continue omeprazole 40 mg daily for now    ______________________________________________________________________    SUBJECTIVE:  Patient says that she is maintaining at least 8 to 10 cups of water a day and did indeed try taking MiraLAX every day for 2 to 3 weeks but explains that this only allowed her to move her bowels once a week or so.  She did try Linzess 72 mcg in the past which did not help.She says that she does get her period 3 times a month and is seeing OB GYN for this.  She would like to see hematology to discuss IV Venofer as p.o. iron worsens her  constipation.  She says she used to get bright red blood per rectum when she would strain with bowel movements but this is not bothering her at this time. She denies black bowel movements.  She denies nausea and vomiting, heartburn, abdominal pain, unintentional weight loss, fevers, chills, family history of colon cancer.      REVIEW OF SYSTEMS IS OTHERWISE NEGATIVE.      Historical Information   Past Medical History:   Diagnosis Date    Constipation     Hyperthyroidism     Pilonidal cyst     Wears glasses      Past Surgical History:   Procedure Laterality Date    INCISION AND DRAINAGE ABSCESS ANAL      Pilonidal Cyst  4 times-cut & drained in office-reoccurred    RI EXCISION PILONIDAL CYST/SINUS COMPLICATED N/A 5/23/2019    Procedure: EXCISION PILONIDAL CYST;  Surgeon: Paola Chavarria MD;  Location: [facility];  Service: General    RI EXCISION PILONIDAL CYST/SINUS SIMPLE N/A 4/5/2018    Procedure: EXCISION PILONIDAL CYST and debriding;  Surgeon: Paola Chavarria MD;  Location: [facility];  Service: General     Social History   Social History     Substance and Sexual Activity   Alcohol Use Yes    Comment: socially     Social History     Substance and Sexual Activity   Drug Use No     Social History     Tobacco Use   Smoking Status Never    Passive exposure: Past   Smokeless Tobacco Never     Family History   Problem Relation Age of Onset    No Known Problems Mother     No Known Problems Father     Crohn's disease Brother     Cancer Maternal Grandmother     Diabetes Maternal Grandmother     Heart disease Maternal Grandmother     Hypertension Maternal Grandmother     Breast cancer Maternal Grandmother     Cancer Paternal Grandmother     Cancer Other     Cancer Other     Cancer Cousin     Asthma Sister        Meds/Allergies       Current Outpatient Medications:     Blisovi 24 Fe 1-20 MG-MCG(24) per tablet    cyclobenzaprine (FLEXERIL) 5 mg tablet    famotidine (PEPCID) 20 mg tablet    linaCLOtide 290 MCG CAPS     "meloxicam (Mobic) 7.5 mg tablet    methimazole (TAPAZOLE) 5 mg tablet    omeprazole (PriLOSEC) 40 MG capsule    ondansetron (ZOFRAN) 4 mg tablet    polyethylene glycol (MIRALAX) 17 g packet    pregabalin (LYRICA) 50 mg capsule    propranolol (INDERAL) 40 mg tablet    gabapentin (Neurontin) 100 mg capsule    norethindrone-ethinyl estradiol (Loestrin Fe 1/20) 1-20 MG-MCG per tablet    nystatin-triamcinolone (MYCOLOG-II) ointment    Sodium Fluoride 5000 PPM 1.1 % PSTE    sucralfate (CARAFATE) 1 g tablet    valACYclovir (VALTREX) 1,000 mg tablet    Allergies   Allergen Reactions    Sulfa Antibiotics Hives           Objective     Blood pressure 102/60, temperature 98.5 °F (36.9 °C), temperature source Tympanic, height 5' 1\" (1.549 m), weight 53.5 kg (118 lb), not currently breastfeeding. Body mass index is 22.3 kg/m².      PHYSICAL EXAM:      General Appearance:   Alert, cooperative, no distress   HEENT:   Normocephalic, atraumatic, anicteric.     Neck:  Supple, symmetrical, trachea midline   Lungs:   Clear to auscultation bilaterally; no rales, rhonchi or wheezing; respirations unlabored    Heart::   Regular rate and rhythm; no murmur, rub, or gallop.   Abdomen:   Soft, non-tender, non-distended; normal bowel sounds; no masses, no organomegaly    Genitalia:   Deferred    Rectal:   Deferred    Extremities:  No cyanosis, clubbing or edema    Pulses:  2+ and symmetric    Skin:  No jaundice, rashes, or lesions    Lymph nodes:  No palpable cervical lymphadenopathy        Lab Results:   No visits with results within 1 Day(s) from this visit.   Latest known visit with results is:   Appointment on 10/05/2024   Component Date Value    TSH 3RD GENERATON 10/05/2024 0.513     Free T4 10/05/2024 0.99     Iron Saturation 10/05/2024 11 (L)     TIBC 10/05/2024 388     Iron 10/05/2024 42 (L)     UIBC 10/05/2024 346     Ferritin 10/05/2024 10 (L)          Radiology Results:   NM gastric emptying    Result Date: 10/29/2024  Narrative: " GASTRIC EMPTYING STUDY INDICATION: R68.81: Early satiety R10.13: Epigastric pain COMPARISON:  None available TECHNIQUE:   The study was performed following the oral administration of 1.0 mCi Tc-99m sulfur colloid combined with scrambled eggs, as part of a standard meal.  Following the meal, one minute anterior and posterior images were obtained immediately and at 0.25 hours, 0.5 hour, 1 hour, 1.5 hour, 2 hour, 3 hour and 4 hour intervals from the time of ingestion.  Geometric mean analyses were then performed. FINDINGS: Gastric emptying at 0.5 hours = 15% (N < 30%) Gastric emptying at 1 hour = 32% (N = 10 - 70%) Gastric emptying at 2 hours = 67% (N = > 40%) Gastric emptying at 3 hours = 93% (N = > 70%) Gastric emptying at 4 hours = (N = >90%) Linear T-1/2 = 94 minutes     Impression: Normal rate of gastric emptying. Workstation performed: [id number]

## 2024-11-22 DIAGNOSIS — E05.00 GRAVES DISEASE: ICD-10-CM

## 2024-11-22 RX ORDER — PROPRANOLOL HYDROCHLORIDE 40 MG/1
40 TABLET ORAL 2 TIMES DAILY
Qty: 60 TABLET | Refills: 0 | Status: SHIPPED | OUTPATIENT
Start: 2024-11-22

## 2024-11-25 ENCOUNTER — IMMUNIZATIONS (OUTPATIENT)
Age: 24
End: 2024-11-25
Payer: COMMERCIAL

## 2024-11-25 DIAGNOSIS — Z23 NEEDS FLU SHOT: Primary | ICD-10-CM

## 2024-11-25 PROCEDURE — 90471 IMMUNIZATION ADMIN: CPT

## 2024-11-25 PROCEDURE — 90673 RIV3 VACCINE NO PRESERV IM: CPT

## 2024-12-04 NOTE — PROGRESS NOTES
Name: Russ Fajrado      : 2000      MRN: [id number]  Encounter Provider: Georgia Aguilera PA-C  Encounter Date: 2024   [facility]  :  Assessment & Plan  Myofascial pain syndrome  Her myofascial pain symptoms have improved somewhat with Lyrica however she is still having generalized pain.  Recommend increasing her dose to 50 mg twice daily.  Encouraged regular home exercise as well.  Labs as ordered to monitor for medication side effects and toxicity.  Plan to follow-up in 6 months or sooner if needed.    Orders:    CBC and differential; Future    Comprehensive metabolic panel; Future    Sedimentation rate, automated; Future    C-reactive protein; Future    pregabalin (LYRICA) 50 mg capsule; Take 1 capsule (50 mg total) by mouth 2 (two) times a day    Encounter for long-term (current) use of medications    Orders:    CBC and differential; Future    Comprehensive metabolic panel; Future    Sedimentation rate, automated; Future    C-reactive protein; Future        History of Present Illness     Russ Fajardo is a 24 y.o. female.  She is here for follow-up of myofascial pain with history of a positive rheumatoid factor.  She has a history of widespread joint and myofascial pain.  She does note feeling tender or sore to touch and is also very sensitive to weather changes.  She has tried physical therapy in the past however this did not provide any significant relief for her.  She is currently taking cyclobenzaprine 5 mg as needed along with meloxicam.  She also added a vitamin D supplement as well as a magnesium malate supplement.  Earlier in the fall she began having worsening myofascial pain and generalized achiness.  She was started on gabapentin however after few weeks she did not find that this was helping with her symptoms and she had more brain fog.  She was ultimately started on Lyrica.  She had initially noticed an  improvement in her overall pain however more recently she has had increasing myofascial pain.  She does notice worsening symptoms in general with the colder, damp weather.  She has a history of hyperthyroidism and follows with endocrinology.  She is currently taking methimazole.  She has a history of iron deficiency anemia.      She had labs done on 10/5/2024.  WBC 4.04.  CMP unremarkable.  ESR, CRP within normal limits.          Review of Systems   Constitutional:  Positive for fatigue (Stable). Negative for chills and fever.   HENT:  Negative for hearing loss, sore throat and tinnitus.    Eyes:  Negative for pain and visual disturbance.   Respiratory:  Negative for cough and shortness of breath.    Cardiovascular:  Negative for chest pain and palpitations.   Gastrointestinal:  Negative for abdominal pain, nausea and vomiting.   Genitourinary:  Negative for difficulty urinating.   Musculoskeletal:  Positive for arthralgias, back pain, myalgias and neck pain. Negative for gait problem, joint swelling and neck stiffness.   Skin:  Negative for rash.   Neurological:  Negative for dizziness, seizures, weakness, numbness and headaches.   Psychiatric/Behavioral:  Negative for confusion, decreased concentration and sleep disturbance.      Current Outpatient Medications on File Prior to Visit   Medication Sig Dispense Refill    Blisovi 24 Fe 1-20 MG-MCG(24) per tablet Take 1 tablet by mouth daily      linaCLOtide 290 MCG CAPS Take 1 capsule by mouth daily before breakfast 30-60 minutes before breakfast 30 capsule 3    methimazole (TAPAZOLE) 5 mg tablet TAKE 2 TABLETS BY MOUTH EVERY  tablet 1    omeprazole (PriLOSEC) 40 MG capsule Take 1 capsule (40 mg total) by mouth daily 30 minutes before breakfast 90 capsule 1    ondansetron (ZOFRAN) 4 mg tablet Take 1 tablet (4 mg total) by mouth every 8 (eight) hours as needed for nausea or vomiting 30 tablet 2    polyethylene glycol (MIRALAX) 17 g packet Take 17 g by mouth  "daily As needed for constipation 100 each 2    propranolol (INDERAL) 40 mg tablet TAKE 1 TABLET BY MOUTH TWICE A DAY 60 tablet 0    [DISCONTINUED] pregabalin (LYRICA) 50 mg capsule Take 1 capsule (50 mg total) by mouth daily 30 capsule 2    cyclobenzaprine (FLEXERIL) 5 mg tablet Take 1 tablet (5 mg total) by mouth 2 (two) times a day as needed for muscle spasms 180 tablet 1    famotidine (PEPCID) 20 mg tablet TAKE 1 TABLET BY MOUTH TWICE A  tablet 1    meloxicam (Mobic) 7.5 mg tablet Take 1 tablet (7.5 mg total) by mouth 2 (two) times a day as needed for moderate pain 180 tablet 1    norethindrone-ethinyl estradiol (Loestrin Fe 1/20) 1-20 MG-MCG per tablet Take 1 tablet by mouth daily (Patient not taking: Reported on 6/19/2024) 84 tablet 2    nystatin-triamcinolone (MYCOLOG-II) ointment Apply topically 2 (two) times a day For up to 2 weeks, as needed for itching/irritation. (Patient not taking: Reported on 6/19/2024) 30 g 0    Sodium Fluoride 5000 PPM 1.1 % PSTE 2 (two) times a day (Patient not taking: Reported on 6/19/2024)      sucralfate (CARAFATE) 1 g tablet TAKE 1 TABLET (1 G TOTAL) BY MOUTH 4 (FOUR) TIMES A DAY AS NEEDED (ABDOMINAL PAIN) (Patient not taking: Reported on 11/7/2024) 360 tablet 1    valACYclovir (VALTREX) 1,000 mg tablet Take 1 tablet (1,000 mg total) by mouth daily for 5 days 5 tablet 5    [DISCONTINUED] gabapentin (Neurontin) 100 mg capsule Take 1 capsule po qhs for 1 week, then 2 capsules po qhs for 1 week, then 3 capsules po qhs thereafter (Patient not taking: Reported on 10/11/2024) 90 capsule 2     No current facility-administered medications on file prior to visit.         Objective   /60 (BP Location: Right arm, Patient Position: Sitting, Cuff Size: Adult)   Pulse 101   Temp 98.4 °F (36.9 °C) (Temporal)   Ht 5' 1.5\" (1.562 m)   Wt 53.3 kg (117 lb 9.6 oz)   SpO2 93%   BMI 21.86 kg/m²      Physical Exam  Constitutional:       Appearance: Normal appearance. "   Cardiovascular:      Rate and Rhythm: Normal rate and regular rhythm.   Pulmonary:      Breath sounds: Normal breath sounds.   Musculoskeletal:         General: No swelling or tenderness.   Skin:     General: Skin is warm and dry.   Neurological:      General: No focal deficit present.      Mental Status: She is alert.           Dragon Dictation software was used to dictate this note. It may contain errors with dictating incorrect words/spelling. Please contact provider directly for any questions.

## 2024-12-05 ENCOUNTER — OFFICE VISIT (OUTPATIENT)
Age: 24
End: 2024-12-05
Payer: COMMERCIAL

## 2024-12-05 VITALS
TEMPERATURE: 98.4 F | HEIGHT: 62 IN | DIASTOLIC BLOOD PRESSURE: 60 MMHG | OXYGEN SATURATION: 93 % | WEIGHT: 117.6 LBS | HEART RATE: 101 BPM | BODY MASS INDEX: 21.64 KG/M2 | SYSTOLIC BLOOD PRESSURE: 118 MMHG

## 2024-12-05 DIAGNOSIS — M79.18 MYOFASCIAL PAIN SYNDROME: Primary | ICD-10-CM

## 2024-12-05 DIAGNOSIS — Z79.899 ENCOUNTER FOR LONG-TERM (CURRENT) USE OF MEDICATIONS: ICD-10-CM

## 2024-12-05 PROCEDURE — 99214 OFFICE O/P EST MOD 30 MIN: CPT | Performed by: PHYSICIAN ASSISTANT

## 2024-12-05 RX ORDER — PREGABALIN 50 MG/1
50 CAPSULE ORAL 2 TIMES DAILY
Qty: 60 CAPSULE | Refills: 5 | Status: SHIPPED | OUTPATIENT
Start: 2024-12-05 | End: 2025-06-03

## 2024-12-05 NOTE — ASSESSMENT & PLAN NOTE
Her myofascial pain symptoms have improved somewhat with Lyrica however she is still having generalized pain.  Recommend increasing her dose to 50 mg twice daily.  Encouraged regular home exercise as well.  Labs as ordered to monitor for medication side effects and toxicity.  Plan to follow-up in 6 months or sooner if needed.    Orders:    CBC and differential; Future    Comprehensive metabolic panel; Future    Sedimentation rate, automated; Future    C-reactive protein; Future    pregabalin (LYRICA) 50 mg capsule; Take 1 capsule (50 mg total) by mouth 2 (two) times a day

## 2024-12-31 ENCOUNTER — OFFICE VISIT (OUTPATIENT)
Age: 24
End: 2024-12-31
Payer: COMMERCIAL

## 2024-12-31 VITALS
TEMPERATURE: 98.4 F | HEIGHT: 62 IN | WEIGHT: 117.2 LBS | OXYGEN SATURATION: 99 % | BODY MASS INDEX: 21.57 KG/M2 | DIASTOLIC BLOOD PRESSURE: 80 MMHG | SYSTOLIC BLOOD PRESSURE: 120 MMHG | HEART RATE: 90 BPM

## 2024-12-31 DIAGNOSIS — R10.13 EPIGASTRIC PAIN: ICD-10-CM

## 2024-12-31 DIAGNOSIS — M54.6 CHRONIC MIDLINE THORACIC BACK PAIN: ICD-10-CM

## 2024-12-31 DIAGNOSIS — G89.29 CHRONIC MIDLINE THORACIC BACK PAIN: ICD-10-CM

## 2024-12-31 DIAGNOSIS — E05.00 GRAVES DISEASE: ICD-10-CM

## 2024-12-31 DIAGNOSIS — Z00.00 ANNUAL PHYSICAL EXAM: Primary | ICD-10-CM

## 2024-12-31 PROCEDURE — 99395 PREV VISIT EST AGE 18-39: CPT | Performed by: INTERNAL MEDICINE

## 2024-12-31 NOTE — PATIENT INSTRUCTIONS
"Patient Education     Routine physical for adults   The Basics   Written by the doctors and editors at [facility]   What is a physical? -- A physical is a routine visit, or \"check-up,\" with your doctor. You might also hear it called a \"wellness visit\" or \"preventive visit.\"  During each visit, the doctor will:   Ask about your physical and mental health   Ask about your habits, behaviors, and lifestyle   Do an exam   Give you vaccines if needed   Talk to you about any medicines you take   Give advice about your health   Answer your questions  Getting regular check-ups is an important part of taking care of your health. It can help your doctor find and treat any problems you have. But it's also important for preventing health problems.  A routine physical is different from a \"sick visit.\" A sick visit is when you see a doctor because of a health concern or problem. Since physicals are scheduled ahead of time, you can think about what you want to ask the doctor.  How often should I get a physical? -- It depends on your age and health. In general, for people age 21 years and older:   If you are younger than 50 years, you might be able to get a physical every 3 years.   If you are 50 years or older, your doctor might recommend a physical every year.  If you have an ongoing health condition, like diabetes or high blood pressure, your doctor will probably want to see you more often.  What happens during a physical? -- In general, each visit will include:   Physical exam - The doctor or nurse will check your height, weight, heart rate, and blood pressure. They will also look at your eyes and ears. They will ask about how you are feeling and whether you have any symptoms that bother you.   Medicines - It's a good idea to bring a list of all the medicines you take to each doctor visit. Your doctor will talk to you about your medicines and answer any questions. Tell them if you are having any side effects that bother you. You " "should also tell them if you are having trouble paying for any of your medicines.   Habits and behaviors - This includes:   Your diet   Your exercise habits   Whether you smoke, drink alcohol, or use drugs   Whether you are sexually active   Whether you feel safe at home  Your doctor will talk to you about things you can do to improve your health and lower your risk of health problems. They will also offer help and support. For example, if you want to quit smoking, they can give you advice and might prescribe medicines. If you want to improve your diet or get more physical activity, they can help you with this, too.   Lab tests, if needed - The tests you get will depend on your age and situation. For example, your doctor might want to check your:   Cholesterol   Blood sugar   Iron level   Vaccines - The recommended vaccines will depend on your age, health, and what vaccines you already had. Vaccines are very important because they can prevent certain serious or deadly infections.   Discussion of screening - \"Screening\" means checking for diseases or other health problems before they cause symptoms. Your doctor can recommend screening based on your age, risk, and preferences. This might include tests to check for:   Cancer, such as breast, prostate, cervical, ovarian, colorectal, prostate, lung, or skin cancer   Sexually transmitted infections, such as chlamydia and gonorrhea   Mental health conditions like depression and anxiety  Your doctor will talk to you about the different types of screening tests. They can help you decide which screenings to have. They can also explain what the results might mean.   Answering questions - The physical is a good time to ask the doctor or nurse questions about your health. If needed, they can refer you to other doctors or specialists, too.  Adults older than 65 years often need other care, too. As you get older, your doctor will talk to you about:   How to prevent falling at " home   Hearing or vision tests   Memory testing   How to take your medicines safely   Making sure that you have the help and support you need at home  All topics are updated as new evidence becomes available and our peer review process is complete.  This topic retrieved from [employer] (formerly known as [employer]) on: May 02, 2024.  Topic 880407 Version 1.0  Release: 32.4.3 - C32.122  © 2024 UpToDate, Inc. and/or its affiliates. All rights reserved.  Consumer Information Use and Disclaimer   Disclaimer: This generalized information is a limited summary of diagnosis, treatment, and/or medication information. It is not meant to be comprehensive and should be used as a tool to help the user understand and/or assess potential diagnostic and treatment options. It does NOT include all information about conditions, treatments, medications, side effects, or risks that may apply to a specific patient. It is not intended to be medical advice or a substitute for the medical advice, diagnosis, or treatment of a health care provider based on the health care provider's examination and assessment of a patient's specific and unique circumstances. Patients must speak with a health care provider for complete information about their health, medical questions, and treatment options, including any risks or benefits regarding use of medications. This information does not endorse any treatments or medications as safe, effective, or approved for treating a specific patient. UpToDate, Inc. and its affiliates disclaim any warranty or liability relating to this information or the use thereof.The use of this information is governed by the Terms of Use, available at https://www.woltersWebcollageuwer.com/en/know/clinical-effectiveness-terms. 2024© UpToDate, Inc. and its affiliates and/or licensors. All rights reserved.  Copyright   © 2024 Up[employer]Date, Inc. and/or its affiliates. All rights reserved.

## 2024-12-31 NOTE — PROGRESS NOTES
Adult Annual Physical  Name: Russ Fajardo      : 2000      MRN: [id number]  Encounter Provider: Rina Xiong MD  Encounter Date: 2024   Encounter department: [facility] PRIMARY CARE    Assessment & Plan  Annual physical exam         Chronic midline thoracic back pain    Orders:    Ambulatory Referral to Orthopedic Surgery; Future    Epigastric pain    Orders:     right upper quadrant; Future      Immunizations and preventive care screenings were discussed with patient today. Appropriate education was printed on patient's after visit summary.    Counseling:  See below  Patient is here for annual physical.  Lately she has been experiencing upper back and neck discomfort.  She was noted to have mild scoliosis on x-ray few years ago.  She has been getting massages without much improvement pressure is altered.  She is also seeing rheumatology and has been placed on Lyrica without significant improvement in her back.  She would like to see orthopedics.  I will defer x-rays at this time.  Labs from this year were reviewed.  Patient is followed regularly with endocrinology.  Patient has ongoing dyspepsia and right upper quadrant discomfort.  She had an EGD which was unremarkable.  Would order liver ultrasound.         History of Present Illness     Adult Annual Physical  Review of Systems  Past Medical History   Past Medical History:   Diagnosis Date    Constipation     Hyperthyroidism     Pilonidal cyst     Wears glasses      Past Surgical History:   Procedure Laterality Date    INCISION AND DRAINAGE ABSCESS ANAL      Pilonidal Cyst  4 times-cut & drained in office-reoccurred    [place] EXCISION PILONIDAL CYST/SINUS COMPLICATED N/A 2019    Procedure: EXCISION PILONIDAL CYST;  Surgeon: Paola Chavarria MD;  Location: [facility];  Service: General    [place] EXCISION PILONIDAL CYST/SINUS SIMPLE N/A 2018    Procedure: EXCISION PILONIDAL CYST and debriding;  Surgeon: Paola Chavarria MD;   Location: [facility] OR;  Service: General     Family History   Problem Relation Age of Onset    No Known Problems Mother     No Known Problems Father     Crohn's disease Brother     Cancer Maternal Grandmother     Diabetes Maternal Grandmother     Heart disease Maternal Grandmother     Hypertension Maternal Grandmother     Breast cancer Maternal Grandmother     Arthritis Maternal Grandmother     Cancer Paternal Grandmother     Cancer Other     Cancer Other     Cancer Cousin     Asthma Sister       reports that she has never smoked. She has been exposed to tobacco smoke. She has never used smokeless tobacco. She reports current alcohol use. She reports that she does not use drugs.  Current Outpatient Medications on File Prior to Visit   Medication Sig Dispense Refill    cyclobenzaprine (FLEXERIL) 5 mg tablet Take 1 tablet (5 mg total) by mouth 2 (two) times a day as needed for muscle spasms 180 tablet 1    linaCLOtide 290 MCG CAPS Take 1 capsule by mouth daily before breakfast 30-60 minutes before breakfast 30 capsule 3    methimazole (TAPAZOLE) 5 mg tablet TAKE 2 TABLETS BY MOUTH EVERY  tablet 1    omeprazole (PriLOSEC) 40 MG capsule Take 1 capsule (40 mg total) by mouth daily 30 minutes before breakfast 90 capsule 1    ondansetron (ZOFRAN) 4 mg tablet Take 1 tablet (4 mg total) by mouth every 8 (eight) hours as needed for nausea or vomiting 30 tablet 2    pregabalin (LYRICA) 50 mg capsule Take 1 capsule (50 mg total) by mouth 2 (two) times a day 60 capsule 5    propranolol (INDERAL) 40 mg tablet TAKE 1 TABLET BY MOUTH TWICE A DAY 60 tablet 0    valACYclovir (VALTREX) 1,000 mg tablet Take 1 tablet (1,000 mg total) by mouth daily for 5 days 5 tablet 5    meloxicam (Mobic) 7.5 mg tablet Take 1 tablet (7.5 mg total) by mouth 2 (two) times a day as needed for moderate pain (Patient not taking: Reported on 12/31/2024) 180 tablet 1    polyethylene glycol (MIRALAX) 17 g packet Take 17 g by mouth daily As needed for  constipation (Patient not taking: Reported on 12/31/2024) 100 each 2    Sodium Fluoride 5000 PPM 1.1 % PSTE 2 (two) times a day (Patient not taking: Reported on 6/19/2024)      [DISCONTINUED] Blisovi 24 Fe 1-20 MG-MCG(24) per tablet Take 1 tablet by mouth daily      [DISCONTINUED] famotidine (PEPCID) 20 mg tablet TAKE 1 TABLET BY MOUTH TWICE A  tablet 1    [DISCONTINUED] norethindrone-ethinyl estradiol (Loestrin Fe 1/20) 1-20 MG-MCG per tablet Take 1 tablet by mouth daily (Patient not taking: Reported on 6/19/2024) 84 tablet 2    [DISCONTINUED] nystatin-triamcinolone (MYCOLOG-II) ointment Apply topically 2 (two) times a day For up to 2 weeks, as needed for itching/irritation. (Patient not taking: Reported on 6/19/2024) 30 g 0    [DISCONTINUED] sucralfate (CARAFATE) 1 g tablet TAKE 1 TABLET (1 G TOTAL) BY MOUTH 4 (FOUR) TIMES A DAY AS NEEDED (ABDOMINAL PAIN) (Patient not taking: Reported on 11/7/2024) 360 tablet 1     No current facility-administered medications on file prior to visit.     Allergies   Allergen Reactions    Sulfa Antibiotics Hives      Current Outpatient Medications on File Prior to Visit   Medication Sig Dispense Refill    cyclobenzaprine (FLEXERIL) 5 mg tablet Take 1 tablet (5 mg total) by mouth 2 (two) times a day as needed for muscle spasms 180 tablet 1    linaCLOtide 290 MCG CAPS Take 1 capsule by mouth daily before breakfast 30-60 minutes before breakfast 30 capsule 3    methimazole (TAPAZOLE) 5 mg tablet TAKE 2 TABLETS BY MOUTH EVERY  tablet 1    omeprazole (PriLOSEC) 40 MG capsule Take 1 capsule (40 mg total) by mouth daily 30 minutes before breakfast 90 capsule 1    ondansetron (ZOFRAN) 4 mg tablet Take 1 tablet (4 mg total) by mouth every 8 (eight) hours as needed for nausea or vomiting 30 tablet 2    pregabalin (LYRICA) 50 mg capsule Take 1 capsule (50 mg total) by mouth 2 (two) times a day 60 capsule 5    propranolol (INDERAL) 40 mg tablet TAKE 1 TABLET BY MOUTH TWICE A DAY  "60 tablet 0    valACYclovir (VALTREX) 1,000 mg tablet Take 1 tablet (1,000 mg total) by mouth daily for 5 days 5 tablet 5    meloxicam (Mobic) 7.5 mg tablet Take 1 tablet (7.5 mg total) by mouth 2 (two) times a day as needed for moderate pain (Patient not taking: Reported on 12/31/2024) 180 tablet 1    polyethylene glycol (MIRALAX) 17 g packet Take 17 g by mouth daily As needed for constipation (Patient not taking: Reported on 12/31/2024) 100 each 2    Sodium Fluoride 5000 PPM 1.1 % PSTE 2 (two) times a day (Patient not taking: Reported on 6/19/2024)      [DISCONTINUED] Blisovi 24 Fe 1-20 MG-MCG(24) per tablet Take 1 tablet by mouth daily      [DISCONTINUED] famotidine (PEPCID) 20 mg tablet TAKE 1 TABLET BY MOUTH TWICE A  tablet 1    [DISCONTINUED] norethindrone-ethinyl estradiol (Loestrin Fe 1/20) 1-20 MG-MCG per tablet Take 1 tablet by mouth daily (Patient not taking: Reported on 6/19/2024) 84 tablet 2    [DISCONTINUED] nystatin-triamcinolone (MYCOLOG-II) ointment Apply topically 2 (two) times a day For up to 2 weeks, as needed for itching/irritation. (Patient not taking: Reported on 6/19/2024) 30 g 0    [DISCONTINUED] sucralfate (CARAFATE) 1 g tablet TAKE 1 TABLET (1 G TOTAL) BY MOUTH 4 (FOUR) TIMES A DAY AS NEEDED (ABDOMINAL PAIN) (Patient not taking: Reported on 11/7/2024) 360 tablet 1     No current facility-administered medications on file prior to visit.      Social History     Tobacco Use    Smoking status: Never     Passive exposure: Past    Smokeless tobacco: Never   Vaping Use    Vaping status: Never Used   Substance and Sexual Activity    Alcohol use: Yes     Comment: socially    Drug use: No    Sexual activity: Yes     Partners: Male     Birth control/protection: Condom Male, Pill       Objective   /80 (BP Location: Left arm, Patient Position: Sitting, Cuff Size: Standard)   Pulse 90   Temp 98.4 °F (36.9 °C) (Tympanic)   Ht 5' 1.5\" (1.562 m)   Wt 53.2 kg (117 lb 3.2 oz)   SpO2 99%   " BMI 21.79 kg/m²     Physical Exam  Constitutional:       General: She is not in acute distress.     Appearance: She is well-developed.   HENT:      Head: Normocephalic.      Mouth/Throat:      Pharynx: No oropharyngeal exudate.   Eyes:      General: No scleral icterus.     Conjunctiva/sclera: Conjunctivae normal.   Neck:      Thyroid: No thyromegaly.      Vascular: No carotid bruit.   Cardiovascular:      Rate and Rhythm: Normal rate and regular rhythm.      Heart sounds: Normal heart sounds. No murmur heard.  Pulmonary:      Effort: Pulmonary effort is normal. No respiratory distress.      Breath sounds: Normal breath sounds. No wheezing or rales.   Abdominal:      General: Bowel sounds are normal. There is no distension.      Palpations: Abdomen is soft.      Tenderness: There is abdominal tenderness (Mild right upper quadrant tenderness on palpation). There is no guarding or rebound.   Musculoskeletal:         General: Deformity (Mild thoracolumbar scoliosis) present.      Right lower leg: No edema.      Left lower leg: No edema.   Lymphadenopathy:      Cervical: No cervical adenopathy.   Skin:     General: Skin is warm.   Neurological:      Mental Status: She is alert and oriented to person, place, and time.      Cranial Nerves: No cranial nerve deficit.      Deep Tendon Reflexes: Reflexes are normal and symmetric.   Psychiatric:         Behavior: Behavior normal.         Thought Content: Thought content normal.         Judgment: Judgment normal.

## 2025-01-02 RX ORDER — PROPRANOLOL HYDROCHLORIDE 40 MG/1
40 TABLET ORAL 2 TIMES DAILY
Qty: 60 TABLET | Refills: 5 | Status: SHIPPED | OUTPATIENT
Start: 2025-01-02

## 2025-01-03 ENCOUNTER — OFFICE VISIT (OUTPATIENT)
Dept: HEMATOLOGY ONCOLOGY | Facility: CLINIC | Age: 25
End: 2025-01-03
Payer: COMMERCIAL

## 2025-01-03 VITALS
RESPIRATION RATE: 16 BRPM | WEIGHT: 116 LBS | OXYGEN SATURATION: 98 % | TEMPERATURE: 98.5 F | BODY MASS INDEX: 21.35 KG/M2 | DIASTOLIC BLOOD PRESSURE: 72 MMHG | HEART RATE: 95 BPM | HEIGHT: 62 IN | SYSTOLIC BLOOD PRESSURE: 114 MMHG

## 2025-01-03 DIAGNOSIS — R79.0 LOW FERRITIN: Primary | ICD-10-CM

## 2025-01-03 DIAGNOSIS — E53.8 FOLATE DEFICIENCY: ICD-10-CM

## 2025-01-03 DIAGNOSIS — E53.8 VITAMIN B12 DEFICIENCY: ICD-10-CM

## 2025-01-03 PROCEDURE — 99203 OFFICE O/P NEW LOW 30 MIN: CPT

## 2025-01-03 NOTE — ASSESSMENT & PLAN NOTE
Orders:    Ambulatory Referral to Hematology / Oncology    CBC and differential; Future    Iron Panel (Includes Ferritin, Iron Sat%, Iron, and TIBC); Future    Vitamin B12; Future    Folate; Future

## 2025-01-03 NOTE — PROGRESS NOTES
Name: Russ Fajardo      : 2000      MRN: [id number]  Encounter Provider: VIVIAN Crockett  Encounter Date: 1/3/2025   Encounter department: [facility] [place]  :  Assessment & Plan  Low ferritin    Orders:    Ambulatory Referral to Hematology / Oncology    CBC and differential; Future    Iron Panel (Includes Ferritin, Iron Sat%, Iron, and TIBC); Future    Vitamin B12; Future    Folate; Future    Vitamin B12 deficiency    Orders:    Vitamin B12; Future    Folate deficiency    Orders:    Folate; Future    24-year-old female with iron deficiency.  We discussed that her iron deficiency likely secondary to menorrhagia.  Since patient is not able to tolerate her oral iron supplements due to constipation, she will likely benefit from IV iron treatment, Venofer 200 mg weekly x 4 doses.  Patient educated about the iron product, administration and common adverse effects including but not limited to: Anaphylaxis/allergic reaction, arthralgias/myalgias, nausea; we will determine to proceed after reviewing her most recent labs.    Since patient's labs were completed in 2024, we will need recent labs for further evaluation of her iron deficiency by obtaining iron studies, CBC, vitamin B12 and folate levels.  I will call her with the results.    We will tentatively set up a 4-month follow-up, which can be changed upon review of the results and labs prior (CBC, iron panel).  Patient is agreeable with this plan.  She is aware to contact us for any additional questions/concerns or worsening symptoms.    History of Present Illness   Chief Complaint   Patient presents with    Consult   Joycelyn Fajardo is a 24-year-old female presenting for initial consultation of her iron deficiency.  Patient has PMH significant for hypothyroidism, anemia, IBS with constipation.  She reports she has been anemic since she was 15 years of age.    Patient reports a monthly menstrual cycle, which  "last about 7 days, 4 days on the heavier side having to change her pad/tampon every hour.  She was previously started on birth control medication for menorrhagia however, she reports her cycle was lasting for a month.    Patient endorses increased fatigue, dizziness, light headedness, increased A1c of headaches, her last, intermittent CP, SOB, palpitations.  She is also having bloody stools, which she attributes to her hemorrhoids.  Denies any hematuria, gingival bleeds or epistaxis.    Patient had GI studies completed 6/2023 where she was found to have H. pylori infection and was treated with antibiotics:  Colonoscopy:  Normal    EGD:  Normal esophagus  Mild gastritis. Biopsied for h pylori gastritis  Normal duodenum. Biopsied for celiac disease.  Stomach biopsy positive for chronic active gastritis, positive for curvilinear Helicobacter microorganisms    Labs:  10/5/2024: Hgb 12.9, MCV 91, WBC 4.04, ANC 2.44, platelets 252,000, serum iron 42, iron saturation 11%, ferritin 10    Review of Systems   Constitutional:  Positive for fatigue.   HENT:  Negative for nosebleeds.    Respiratory:  Positive for shortness of breath.    Cardiovascular:  Positive for chest pain and palpitations. Negative for leg swelling.   Gastrointestinal:  Positive for blood in stool. Negative for abdominal pain.   Endocrine: Negative.    Genitourinary:  Negative for hematuria.   Musculoskeletal: Negative.    Neurological:  Positive for dizziness, light-headedness and headaches.   Hematological: Negative.            Objective   /72 (BP Location: Left arm, Patient Position: Sitting, Cuff Size: Adult)   Pulse 95   Temp 98.5 °F (36.9 °C) (Temporal)   Resp 16   Ht 5' 1.5\" (1.562 m)   Wt 52.6 kg (116 lb)   SpO2 98%   BMI 21.56 kg/m²     Physical Exam  Constitutional:       Appearance: Normal appearance.   HENT:      Head: Normocephalic and atraumatic.   Cardiovascular:      Rate and Rhythm: Regular rhythm. Tachycardia present.      " Heart sounds: Normal heart sounds.   Pulmonary:      Breath sounds: Normal breath sounds.   Musculoskeletal:      Cervical back: Normal range of motion.      Right lower leg: No edema.      Left lower leg: No edema.   Skin:     General: Skin is warm and dry.   Neurological:      Mental Status: She is alert and oriented to person, place, and time.   Psychiatric:         Mood and Affect: Mood normal.         Behavior: Behavior normal.         Thought Content: Thought content normal.         Judgment: Judgment normal.         Labs: I have reviewed the following labs:  Results for orders placed or performed in visit on 10/05/24   TSH, 3rd generation   Result Value Ref Range    TSH 3RD GENERATON 0.513 0.450 - 4.500 uIU/mL   T4, free   Result Value Ref Range    Free T4 0.99 0.61 - 1.12 ng/dL   TIBC Panel (incl. Iron, TIBC, % Iron Saturation)   Result Value Ref Range    Iron Saturation 11 (L) 15 - 50 %    TIBC 388 250 - 450 ug/dL    Iron 42 (L) 50 - 212 ug/dL    UIBC 346 155 - 355 ug/dL   Result Value Ref Range    Ferritin 10 (L) 11 - 307 ng/mL     I spent 40 minutes in chart review, counseling, coordination of care, and documentation.    This note has been generated by voice recognition software system.  Therefore, there may be spelling, grammar, and or syntax errors. Please contact if questions arise.

## 2025-01-09 DIAGNOSIS — Z86.19 HISTORY OF HERPES GENITALIS: ICD-10-CM

## 2025-01-11 ENCOUNTER — APPOINTMENT (OUTPATIENT)
Dept: LAB | Facility: CLINIC | Age: 25
End: 2025-01-11
Payer: COMMERCIAL

## 2025-01-11 DIAGNOSIS — R79.0 LOW FERRITIN: ICD-10-CM

## 2025-01-11 DIAGNOSIS — E53.8 VITAMIN B12 DEFICIENCY: ICD-10-CM

## 2025-01-11 DIAGNOSIS — E05.90 HYPERTHYROIDISM: ICD-10-CM

## 2025-01-11 DIAGNOSIS — Z79.899 ENCOUNTER FOR LONG-TERM (CURRENT) USE OF MEDICATIONS: ICD-10-CM

## 2025-01-11 DIAGNOSIS — M79.18 MYOFASCIAL PAIN SYNDROME: ICD-10-CM

## 2025-01-11 LAB
ALBUMIN SERPL BCG-MCNC: 4.3 G/DL (ref 3.5–5)
ALP SERPL-CCNC: 44 U/L (ref 34–104)
ALT SERPL W P-5'-P-CCNC: 10 U/L (ref 7–52)
ANION GAP SERPL CALCULATED.3IONS-SCNC: 9 MMOL/L (ref 4–13)
AST SERPL W P-5'-P-CCNC: 13 U/L (ref 13–39)
BASOPHILS # BLD AUTO: 0.02 THOUSANDS/ΜL (ref 0–0.1)
BASOPHILS NFR BLD AUTO: 1 % (ref 0–1)
BILIRUB SERPL-MCNC: 0.45 MG/DL (ref 0.2–1)
BUN SERPL-MCNC: 14 MG/DL (ref 5–25)
CALCIUM SERPL-MCNC: 9 MG/DL (ref 8.4–10.2)
CHLORIDE SERPL-SCNC: 101 MMOL/L (ref 96–108)
CO2 SERPL-SCNC: 27 MMOL/L (ref 21–32)
CREAT SERPL-MCNC: 0.68 MG/DL (ref 0.6–1.3)
CRP SERPL QL: 2.5 MG/L
EOSINOPHIL # BLD AUTO: 0 THOUSAND/ΜL (ref 0–0.61)
EOSINOPHIL NFR BLD AUTO: 0 % (ref 0–6)
ERYTHROCYTE [DISTWIDTH] IN BLOOD BY AUTOMATED COUNT: 12.6 % (ref 11.6–15.1)
ERYTHROCYTE [SEDIMENTATION RATE] IN BLOOD: 7 MM/HOUR (ref 0–19)
FERRITIN SERPL-MCNC: 24 NG/ML (ref 11–307)
GFR SERPL CREATININE-BSD FRML MDRD: 124 ML/MIN/1.73SQ M
GLUCOSE P FAST SERPL-MCNC: 84 MG/DL (ref 65–99)
HCT VFR BLD AUTO: 36.5 % (ref 34.8–46.1)
HGB BLD-MCNC: 11.7 G/DL (ref 11.5–15.4)
IMM GRANULOCYTES # BLD AUTO: 0.01 THOUSAND/UL (ref 0–0.2)
IMM GRANULOCYTES NFR BLD AUTO: 0 % (ref 0–2)
IRON SATN MFR SERPL: 14 % (ref 15–50)
IRON SERPL-MCNC: 56 UG/DL (ref 50–212)
LYMPHOCYTES # BLD AUTO: 1.3 THOUSANDS/ΜL (ref 0.6–4.47)
LYMPHOCYTES NFR BLD AUTO: 41 % (ref 14–44)
MCH RBC QN AUTO: 28.6 PG (ref 26.8–34.3)
MCHC RBC AUTO-ENTMCNC: 32.1 G/DL (ref 31.4–37.4)
MCV RBC AUTO: 89 FL (ref 82–98)
MONOCYTES # BLD AUTO: 0.34 THOUSAND/ΜL (ref 0.17–1.22)
MONOCYTES NFR BLD AUTO: 11 % (ref 4–12)
NEUTROPHILS # BLD AUTO: 1.52 THOUSANDS/ΜL (ref 1.85–7.62)
NEUTS SEG NFR BLD AUTO: 47 % (ref 43–75)
NRBC BLD AUTO-RTO: 0 /100 WBCS
PLATELET # BLD AUTO: 230 THOUSANDS/UL (ref 149–390)
PMV BLD AUTO: 12.7 FL (ref 8.9–12.7)
POTASSIUM SERPL-SCNC: 4 MMOL/L (ref 3.5–5.3)
PROT SERPL-MCNC: 7.6 G/DL (ref 6.4–8.4)
RBC # BLD AUTO: 4.09 MILLION/UL (ref 3.81–5.12)
SODIUM SERPL-SCNC: 137 MMOL/L (ref 135–147)
T3FREE SERPL-MCNC: 3.5 PG/ML (ref 2.5–3.9)
T4 FREE SERPL-MCNC: 0.93 NG/DL (ref 0.61–1.12)
TIBC SERPL-MCNC: 410.2 UG/DL (ref 250–450)
TRANSFERRIN SERPL-MCNC: 293 MG/DL (ref 203–362)
TSH SERPL DL<=0.05 MIU/L-ACNC: 0.51 UIU/ML (ref 0.45–4.5)
UIBC SERPL-MCNC: 354 UG/DL (ref 155–355)
VIT B12 SERPL-MCNC: 245 PG/ML (ref 180–914)
WBC # BLD AUTO: 3.19 THOUSAND/UL (ref 4.31–10.16)

## 2025-01-11 PROCEDURE — 84439 ASSAY OF FREE THYROXINE: CPT

## 2025-01-11 PROCEDURE — 84445 ASSAY OF TSI GLOBULIN: CPT

## 2025-01-11 PROCEDURE — 80053 COMPREHEN METABOLIC PANEL: CPT

## 2025-01-11 PROCEDURE — 82728 ASSAY OF FERRITIN: CPT

## 2025-01-11 PROCEDURE — 85025 COMPLETE CBC W/AUTO DIFF WBC: CPT

## 2025-01-11 PROCEDURE — 84443 ASSAY THYROID STIM HORMONE: CPT

## 2025-01-11 PROCEDURE — 36415 COLL VENOUS BLD VENIPUNCTURE: CPT

## 2025-01-11 PROCEDURE — 83540 ASSAY OF IRON: CPT

## 2025-01-11 PROCEDURE — 82607 VITAMIN B-12: CPT

## 2025-01-11 PROCEDURE — 84481 FREE ASSAY (FT-3): CPT

## 2025-01-11 PROCEDURE — 83550 IRON BINDING TEST: CPT

## 2025-01-11 PROCEDURE — 86140 C-REACTIVE PROTEIN: CPT

## 2025-01-11 PROCEDURE — 85652 RBC SED RATE AUTOMATED: CPT

## 2025-01-12 ENCOUNTER — HOSPITAL ENCOUNTER (OUTPATIENT)
Dept: ULTRASOUND IMAGING | Facility: HOSPITAL | Age: 25
Discharge: HOME | End: 2025-01-12
Payer: COMMERCIAL

## 2025-01-12 DIAGNOSIS — R10.13 EPIGASTRIC PAIN: ICD-10-CM

## 2025-01-12 PROCEDURE — 76705 ECHO EXAM OF ABDOMEN: CPT

## 2025-01-13 ENCOUNTER — TELEPHONE (OUTPATIENT)
Dept: HEMATOLOGY ONCOLOGY | Facility: CLINIC | Age: 25
End: 2025-01-13

## 2025-01-13 DIAGNOSIS — E53.8 VITAMIN B12 DEFICIENCY: ICD-10-CM

## 2025-01-13 DIAGNOSIS — D50.8 IRON DEFICIENCY ANEMIA SECONDARY TO INADEQUATE DIETARY IRON INTAKE: Primary | ICD-10-CM

## 2025-01-13 DIAGNOSIS — D70.9 NEUTROPENIA, UNSPECIFIED TYPE (HCC): ICD-10-CM

## 2025-01-13 LAB — TSI SER-ACNC: <0.1 IU/L (ref 0–0.55)

## 2025-01-13 RX ORDER — CYANOCOBALAMIN 1000 UG/ML
1000 INJECTION, SOLUTION INTRAMUSCULAR; SUBCUTANEOUS ONCE
Status: CANCELLED | OUTPATIENT
Start: 2025-01-27 | End: 2025-01-27

## 2025-01-13 RX ORDER — CYANOCOBALAMIN 1000 UG/ML
1000 INJECTION, SOLUTION INTRAMUSCULAR; SUBCUTANEOUS ONCE
OUTPATIENT
Start: 2025-01-27 | End: 2025-01-27

## 2025-01-13 RX ORDER — SODIUM CHLORIDE 9 MG/ML
20 INJECTION, SOLUTION INTRAVENOUS ONCE
OUTPATIENT
Start: 2025-01-27

## 2025-01-13 RX ORDER — MULTIVITAMIN WITH IRON
500 TABLET ORAL DAILY
Qty: 90 TABLET | Refills: 0 | Status: SHIPPED | OUTPATIENT
Start: 2025-01-13

## 2025-01-13 RX ORDER — SODIUM CHLORIDE 9 MG/ML
20 INJECTION, SOLUTION INTRAVENOUS ONCE
Status: CANCELLED | OUTPATIENT
Start: 2025-01-27

## 2025-01-13 NOTE — TELEPHONE ENCOUNTER
Spoke with patient to review her blood work.  Her iron studies did improve overall, she will likely benefit from IV iron due to her symptoms.  Spoken about Venofer 300 mg weekly x 4 doses.  We reviewed the side effects and symptoms at the office visit.  In addition, her vitamin B12 is on the low end of normal therefore, we will give her vitamin B12 injections with the iron infusions.  In addition, recommend she start vitamin B12 500 mcg daily.  I will send this supplement to her pharmacy.  Patient is also noted to be neutropenic, she is losing weight unintentionally, she has lost about 6 pounds in the last month.  Advised her to keep an eye on it and if the weight loss continues, to let me know.    We will see her back in the office in 4 months with labs prior (vitamin B12, CBC, iron panel).  She is agreeable with the plan.

## 2025-01-14 ENCOUNTER — RESULTS FOLLOW-UP (OUTPATIENT)
Dept: ENDOCRINOLOGY | Facility: CLINIC | Age: 25
End: 2025-01-14

## 2025-01-14 RX ORDER — VALACYCLOVIR HYDROCHLORIDE 1 G/1
1000 TABLET, FILM COATED ORAL DAILY
Qty: 5 TABLET | Refills: 5 | Status: SHIPPED | OUTPATIENT
Start: 2025-01-14 | End: 2025-01-23 | Stop reason: SDUPTHER

## 2025-01-15 ENCOUNTER — RESULTS FOLLOW-UP (OUTPATIENT)
Age: 25
End: 2025-01-15

## 2025-01-23 ENCOUNTER — ANNUAL EXAM (OUTPATIENT)
Dept: OBGYN CLINIC | Facility: CLINIC | Age: 25
End: 2025-01-23
Payer: COMMERCIAL

## 2025-01-23 VITALS
DIASTOLIC BLOOD PRESSURE: 68 MMHG | HEIGHT: 61 IN | BODY MASS INDEX: 21.52 KG/M2 | WEIGHT: 114 LBS | SYSTOLIC BLOOD PRESSURE: 104 MMHG

## 2025-01-23 DIAGNOSIS — Z30.41 ENCOUNTER FOR SURVEILLANCE OF CONTRACEPTIVE PILLS: ICD-10-CM

## 2025-01-23 DIAGNOSIS — Z86.19 HISTORY OF HERPES GENITALIS: ICD-10-CM

## 2025-01-23 DIAGNOSIS — Z01.419 ENCOUNTER FOR ANNUAL ROUTINE GYNECOLOGICAL EXAMINATION: Primary | ICD-10-CM

## 2025-01-23 PROCEDURE — 99395 PREV VISIT EST AGE 18-39: CPT | Performed by: PHYSICIAN ASSISTANT

## 2025-01-23 RX ORDER — VALACYCLOVIR HYDROCHLORIDE 1 G/1
1000 TABLET, FILM COATED ORAL DAILY
Qty: 5 TABLET | Refills: 5 | Status: SHIPPED | OUTPATIENT
Start: 2025-01-23 | End: 2025-01-23

## 2025-01-23 RX ORDER — VALACYCLOVIR HYDROCHLORIDE 1 G/1
TABLET, FILM COATED ORAL
Qty: 20 TABLET | Refills: 0 | Status: SHIPPED | OUTPATIENT
Start: 2025-01-23 | End: 2026-01-23

## 2025-01-23 RX ORDER — NORETHINDRONE ACETATE AND ETHINYL ESTRADIOL 1.5-30(21)
1 KIT ORAL DAILY
Qty: 90 TABLET | Refills: 3 | Status: SHIPPED | OUTPATIENT
Start: 2025-01-23

## 2025-01-23 NOTE — PROGRESS NOTES
Assessment   24 y.o.  presenting for annual exam.     Plan   Diagnoses and all orders for this visit:    Encounter for annual routine gynecological examination    Normal findings on routine exam.  Encouraged 150 min of exercise per week.  Reviewed balanced diet.   Multivitamin encouraged   Breast awareness/SBE encouraged     Encounter for surveillance of contraceptive pills  -     norethindrone-ethinyl estradiol-iron (Loestrin Fe 1.530) 1.5-30 MG-MCG tablet; Take 1 tablet by mouth daily    Stopped loestrin 1 month because of BTB. Previously thought depo caused weight gain but now thinks it was her idet and exercise.   Options reviewed - desires to restart pill.     Use / SE reviewed.  Reviewed possibility for irregular bleeding in first 3 months of pill use. Can call with concerns.  Condoms recommended for STD prevention.   Warning sings of use reviewed. She will report these immediately should they occur.     History of herpes genitalis  -     valACYclovir (VALTREX) 1,000 mg tablet; Take two tablets (2000 mg) twice daily for one day. Repeat with each episode.  Dispense: 20 tablet; Refill: 0      PRN use for cold sores sent. Discussed use for genital herpes but hasn't had outbreak in a few years.       Pap -due   Mammo - due @ 42        RTO one year for yearly exam or sooner as needed.    __________________________________________________________________    Subjective     Russ Fajardo is a 24 y.o.  presenting for annual exam.     Weight lifting and doing cardio a few days per week  Things going well with boyfriend     SCREENING  Last Pap: 10/11/2023 NILM  Last Mammo: n/a   Last Colonoscopy: 2023  -  return to routine screening     GYN    Periods are regular, monthly, not heavy or painful.     Sexually active: Yes - single partner - male (same as prior)  Concerns: denies pain, bleeding, dryness   Contraception: Stopped her OCP (Lo estrin) last month due to BTB. Was consistent with  timing. Now using condoms and withdrawal.     Hx Abnormal pap: no  We reviewed ASCCP guidelines for Pap testing today.  Gardasil: She has completed the Gardasil series.    Denies vaginal discharge, itching, odor, dyspareunia, pelvic pain and vulvar/vaginal symptoms    OB         Complaints: denies   Denies urgency, frequency, hematuria, leakage / change in stream, difficulty urinating.       BREAST  Complaints: denies   Denies: breast lump, breast tenderness, nipple discharge, skin color change, and skin lesion(s)    Pertinent Family Hx:   Family hx of breast cancer: MGM  Family hx of ovarian cancer: no  Family hx of colon cancer: no      GENERAL  PMH reviewed/updated and is as below.     Past Medical History:   Diagnosis Date    Constipation     Hyperthyroidism     Pilonidal cyst     Wears glasses        Past Surgical History:   Procedure Laterality Date    INCISION AND DRAINAGE ABSCESS ANAL      Pilonidal Cyst  4 times-cut & drained in office-reoccurred    [place] EXCISION PILONIDAL CYST/SINUS COMPLICATED N/A 2019    Procedure: EXCISION PILONIDAL CYST;  Surgeon: Paola Chavarria MD;  Location: [facility];  Service: General    [place] EXCISION PILONIDAL CYST/SINUS SIMPLE N/A 2018    Procedure: EXCISION PILONIDAL CYST and debriding;  Surgeon: Paola Chavarria MD;  Location: [facility];  Service: General         Current Outpatient Medications:     cyclobenzaprine (FLEXERIL) 5 mg tablet, Take 1 tablet (5 mg total) by mouth 2 (two) times a day as needed for muscle spasms, Disp: 180 tablet, Rfl: 1    linaCLOtide 290 MCG CAPS, Take 1 capsule by mouth daily before breakfast 30-60 minutes before breakfast, Disp: 30 capsule, Rfl: 3    meloxicam (Mobic) 7.5 mg tablet, Take 1 tablet (7.5 mg total) by mouth 2 (two) times a day as needed for moderate pain, Disp: 180 tablet, Rfl: 1    methimazole (TAPAZOLE) 5 mg tablet, TAKE 2 TABLETS BY MOUTH EVERY DAY, Disp: 180 tablet, Rfl: 1    norethindrone-ethinyl estradiol-iron  (Loestrin Fe 1.5/30) 1.5-30 MG-MCG tablet, Take 1 tablet by mouth daily, Disp: 90 tablet, Rfl: 3    omeprazole (PriLOSEC) 40 MG capsule, Take 1 capsule (40 mg total) by mouth daily 30 minutes before breakfast, Disp: 90 capsule, Rfl: 1    ondansetron (ZOFRAN) 4 mg tablet, Take 1 tablet (4 mg total) by mouth every 8 (eight) hours as needed for nausea or vomiting, Disp: 30 tablet, Rfl: 2    pregabalin (LYRICA) 50 mg capsule, Take 1 capsule (50 mg total) by mouth 2 (two) times a day, Disp: 60 capsule, Rfl: 5    propranolol (INDERAL) 40 mg tablet, TAKE 1 TABLET BY MOUTH TWICE A DAY, Disp: 60 tablet, Rfl: 5    Sodium Fluoride 5000 PPM 1.1 % PSTE, 2 (two) times a day, Disp: , Rfl:     valACYclovir (VALTREX) 1,000 mg tablet, Take 1 tablet (1,000 mg total) by mouth daily for 5 days, Disp: 5 tablet, Rfl: 5    vitamin B-12 (VITAMIN B-12) 500 mcg tablet, Take 1 tablet (500 mcg total) by mouth daily, Disp: 90 tablet, Rfl: 0    polyethylene glycol (MIRALAX) 17 g packet, Take 17 g by mouth daily As needed for constipation (Patient not taking: Reported on 12/31/2024), Disp: 100 each, Rfl: 2    Allergies   Allergen Reactions    Sulfa Antibiotics Hives       Social History     Socioeconomic History    Marital status: Single     Spouse name: Not on file    Number of children: Not on file    Years of education: Not on file    Highest education level: Not on file   Occupational History    Occupation: [employer]   Tobacco Use    Smoking status: Never     Passive exposure: Past    Smokeless tobacco: Never   Vaping Use    Vaping status: Never Used   Substance and Sexual Activity    Alcohol use: Yes     Comment: socially    Drug use: No    Sexual activity: Yes     Partners: Male     Birth control/protection: Condom Male   Other Topics Concern    Not on file   Social History Narrative    Not on file     Social Drivers of Health     Financial Resource Strain: Low Risk  (3/17/2023)    Overall Financial Resource Strain (CARDIA)     Difficulty  "of Paying Living Expenses: Not hard at all   Food Insecurity: No Food Insecurity (3/17/2023)    Hunger Vital Sign     Worried About Running Out of Food in the Last Year: Never true     Ran Out of Food in the Last Year: Never true   Transportation Needs: No Transportation Needs (3/17/2023)    PRAPARE - Transportation     Lack of Transportation (Medical): No     Lack of Transportation (Non-Medical): No   Physical Activity: Sufficiently Active (4/7/2022)    Exercise Vital Sign     Days of Exercise per Week: 3 days     Minutes of Exercise per Session: 60 min   Stress: No Stress Concern Present (4/7/2022)    [employer] [place] [employer] - Occupational Stress Questionnaire     Feeling of Stress : Not at all   Social Connections: Not on file   Intimate Partner Violence: Not At Risk (8/17/2022)    Humiliation, Afraid, Rape, and Kick questionnaire     Fear of Current or Ex-Partner: No     Emotionally Abused: No     Physically Abused: No     Sexually Abused: No   Housing Stability: Low Risk  (3/17/2023)    Housing Stability Vital Sign     Unable to Pay for Housing in the Last Year: No     Number of Places Lived in the Last Year: 2     Unstable Housing in the Last Year: No       Review of Systems     Constitutional: Negative.   Respiratory: Negative.    Cardiovascular: Negative   Gastrointestinal: Negative   Breasts: As noted above.   Genitourinary: As noted above.   Psychiatric: Negative     Objective      /68 (BP Location: Left arm, Patient Position: Sitting, Cuff Size: Standard)   Ht 5' 1\" (1.549 m)   Wt 51.7 kg (114 lb)   LMP 01/09/2025   BMI 21.54 kg/m²     Physical Examination:    Patient appears well and is not in distress  Breasts are symmetrical without mass, tenderness, nipple discharge, skin changes or adenopathy.   Abdomen is soft and nontender without masses.   External genitals are normal without lesions or rashes.  Urethral meatus and urethra are normal  Bladder is normal to " palpation  Vagina is normal without discharge or bleeding.   Cervix is normal without discharge or lesion.   Uterus is normal, mobile, nontender without palpable mass.  Adnexa are normal, nontender, without palpable mass.

## 2025-01-24 ENCOUNTER — PATIENT MESSAGE (OUTPATIENT)
Dept: OBGYN CLINIC | Facility: CLINIC | Age: 25
End: 2025-01-24

## 2025-01-30 ENCOUNTER — TELEPHONE (OUTPATIENT)
Dept: INFUSION CENTER | Facility: CLINIC | Age: 25
End: 2025-01-30

## 2025-01-30 DIAGNOSIS — K59.04 CHRONIC IDIOPATHIC CONSTIPATION: ICD-10-CM

## 2025-01-30 NOTE — TELEPHONE ENCOUNTER
Called pt for new infusion pt phone call, LM for pt and provided contact information for any questions.

## 2025-01-31 ENCOUNTER — HOSPITAL ENCOUNTER (OUTPATIENT)
Dept: INFUSION CENTER | Facility: CLINIC | Age: 25
End: 2025-01-31
Payer: COMMERCIAL

## 2025-01-31 VITALS
SYSTOLIC BLOOD PRESSURE: 118 MMHG | TEMPERATURE: 98.2 F | OXYGEN SATURATION: 100 % | HEART RATE: 82 BPM | RESPIRATION RATE: 18 BRPM | DIASTOLIC BLOOD PRESSURE: 71 MMHG

## 2025-01-31 DIAGNOSIS — E53.8 VITAMIN B12 DEFICIENCY: Primary | ICD-10-CM

## 2025-01-31 DIAGNOSIS — D50.8 IRON DEFICIENCY ANEMIA SECONDARY TO INADEQUATE DIETARY IRON INTAKE: ICD-10-CM

## 2025-01-31 PROCEDURE — 96365 THER/PROPH/DIAG IV INF INIT: CPT

## 2025-01-31 PROCEDURE — 96372 THER/PROPH/DIAG INJ SC/IM: CPT

## 2025-01-31 RX ORDER — SODIUM CHLORIDE 9 MG/ML
20 INJECTION, SOLUTION INTRAVENOUS ONCE
Status: COMPLETED | OUTPATIENT
Start: 2025-01-31 | End: 2025-01-31

## 2025-01-31 RX ORDER — CYANOCOBALAMIN 1000 UG/ML
1000 INJECTION, SOLUTION INTRAMUSCULAR; SUBCUTANEOUS ONCE
Status: COMPLETED | OUTPATIENT
Start: 2025-01-31 | End: 2025-01-31

## 2025-01-31 RX ORDER — SODIUM CHLORIDE 9 MG/ML
20 INJECTION, SOLUTION INTRAVENOUS ONCE
Status: CANCELLED | OUTPATIENT
Start: 2025-02-07

## 2025-01-31 RX ORDER — CYANOCOBALAMIN 1000 UG/ML
1000 INJECTION, SOLUTION INTRAMUSCULAR; SUBCUTANEOUS ONCE
Status: CANCELLED | OUTPATIENT
Start: 2025-02-07 | End: 2025-02-07

## 2025-01-31 RX ADMIN — IRON SUCROSE 300 MG: 20 INJECTION, SOLUTION INTRAVENOUS at 16:01

## 2025-01-31 RX ADMIN — CYANOCOBALAMIN 1000 MCG: 1000 INJECTION, SOLUTION INTRAMUSCULAR at 16:03

## 2025-01-31 RX ADMIN — SODIUM CHLORIDE 20 ML/HR: 0.9 INJECTION, SOLUTION INTRAVENOUS at 16:01

## 2025-01-31 NOTE — PROGRESS NOTES
Tolerated infusion without incident: No adverse reactions noted: Verified follow up appt with patient ( 02/07/25 ): AVS offered and declined

## 2025-02-07 ENCOUNTER — HOSPITAL ENCOUNTER (OUTPATIENT)
Dept: INFUSION CENTER | Facility: CLINIC | Age: 25
End: 2025-02-07
Payer: COMMERCIAL

## 2025-02-07 VITALS
OXYGEN SATURATION: 98 % | SYSTOLIC BLOOD PRESSURE: 118 MMHG | HEART RATE: 93 BPM | TEMPERATURE: 99.4 F | DIASTOLIC BLOOD PRESSURE: 78 MMHG | RESPIRATION RATE: 16 BRPM

## 2025-02-07 DIAGNOSIS — E53.8 VITAMIN B12 DEFICIENCY: Primary | ICD-10-CM

## 2025-02-07 DIAGNOSIS — D50.8 IRON DEFICIENCY ANEMIA SECONDARY TO INADEQUATE DIETARY IRON INTAKE: ICD-10-CM

## 2025-02-07 PROCEDURE — 96372 THER/PROPH/DIAG INJ SC/IM: CPT

## 2025-02-07 PROCEDURE — 96365 THER/PROPH/DIAG IV INF INIT: CPT

## 2025-02-07 RX ORDER — SODIUM CHLORIDE 9 MG/ML
20 INJECTION, SOLUTION INTRAVENOUS ONCE
Status: CANCELLED | OUTPATIENT
Start: 2025-02-14

## 2025-02-07 RX ORDER — SODIUM CHLORIDE 9 MG/ML
20 INJECTION, SOLUTION INTRAVENOUS ONCE
Status: COMPLETED | OUTPATIENT
Start: 2025-02-07 | End: 2025-02-07

## 2025-02-07 RX ORDER — CYANOCOBALAMIN 1000 UG/ML
1000 INJECTION, SOLUTION INTRAMUSCULAR; SUBCUTANEOUS ONCE
Status: COMPLETED | OUTPATIENT
Start: 2025-02-07 | End: 2025-02-07

## 2025-02-07 RX ORDER — CYANOCOBALAMIN 1000 UG/ML
1000 INJECTION, SOLUTION INTRAMUSCULAR; SUBCUTANEOUS ONCE
Status: CANCELLED | OUTPATIENT
Start: 2025-02-14 | End: 2025-02-14

## 2025-02-07 RX ADMIN — IRON SUCROSE 300 MG: 20 INJECTION, SOLUTION INTRAVENOUS at 15:58

## 2025-02-07 RX ADMIN — CYANOCOBALAMIN 1000 MCG: 1000 INJECTION, SOLUTION INTRAMUSCULAR at 15:56

## 2025-02-07 RX ADMIN — SODIUM CHLORIDE 20 ML/HR: 0.9 INJECTION, SOLUTION INTRAVENOUS at 15:56

## 2025-02-07 NOTE — PROGRESS NOTES
Patient arrives for Venofer infusion. PIV placed R FA by MICHAEL RN without issue, brisk blood return noted, flushed without resistance. Patient resting comfortably on recliner, call bell within reach.

## 2025-02-07 NOTE — PROGRESS NOTES
Patient tolerated treatment without incident. Peripheral IV removed. Next appointment confirmed for 2/14/2025 at 1530. Work note provided, AVS offered and declined.

## 2025-02-10 DIAGNOSIS — M79.18 MYOFASCIAL PAIN SYNDROME: ICD-10-CM

## 2025-02-11 RX ORDER — CYCLOBENZAPRINE HCL 5 MG
5 TABLET ORAL 2 TIMES DAILY PRN
Qty: 60 TABLET | Refills: 8 | Status: SHIPPED | OUTPATIENT
Start: 2025-02-11 | End: 2025-08-10

## 2025-02-14 ENCOUNTER — HOSPITAL ENCOUNTER (EMERGENCY)
Facility: HOSPITAL | Age: 25
Discharge: HOME | End: 2025-02-14
Attending: EMERGENCY MEDICINE
Payer: COMMERCIAL

## 2025-02-14 ENCOUNTER — HOSPITAL ENCOUNTER (OUTPATIENT)
Dept: INFUSION CENTER | Facility: CLINIC | Age: 25
End: 2025-02-14
Payer: COMMERCIAL

## 2025-02-14 VITALS
DIASTOLIC BLOOD PRESSURE: 80 MMHG | RESPIRATION RATE: 21 BRPM | SYSTOLIC BLOOD PRESSURE: 124 MMHG | HEART RATE: 87 BPM | TEMPERATURE: 98 F | OXYGEN SATURATION: 99 %

## 2025-02-14 VITALS
RESPIRATION RATE: 16 BRPM | TEMPERATURE: 97.7 F | DIASTOLIC BLOOD PRESSURE: 82 MMHG | HEART RATE: 92 BPM | SYSTOLIC BLOOD PRESSURE: 130 MMHG | OXYGEN SATURATION: 98 %

## 2025-02-14 DIAGNOSIS — D50.8 IRON DEFICIENCY ANEMIA SECONDARY TO INADEQUATE DIETARY IRON INTAKE: ICD-10-CM

## 2025-02-14 DIAGNOSIS — E53.8 VITAMIN B12 DEFICIENCY: Primary | ICD-10-CM

## 2025-02-14 DIAGNOSIS — T78.40XA ALLERGIC REACTION, INITIAL ENCOUNTER: Primary | ICD-10-CM

## 2025-02-14 PROCEDURE — 96372 THER/PROPH/DIAG INJ SC/IM: CPT

## 2025-02-14 PROCEDURE — 96365 THER/PROPH/DIAG IV INF INIT: CPT

## 2025-02-14 PROCEDURE — 96375 TX/PRO/DX INJ NEW DRUG ADDON: CPT

## 2025-02-14 PROCEDURE — 96374 THER/PROPH/DIAG INJ IV PUSH: CPT

## 2025-02-14 PROCEDURE — 99284 EMERGENCY DEPT VISIT MOD MDM: CPT | Performed by: EMERGENCY MEDICINE

## 2025-02-14 PROCEDURE — 99283 EMERGENCY DEPT VISIT LOW MDM: CPT

## 2025-02-14 RX ORDER — SODIUM CHLORIDE 9 MG/ML
20 INJECTION, SOLUTION INTRAVENOUS ONCE
OUTPATIENT
Start: 2025-02-21

## 2025-02-14 RX ORDER — EPINEPHRINE 1 MG/ML
INJECTION, SOLUTION, CONCENTRATE INTRAVENOUS
Status: DISCONTINUED
Start: 2025-02-14 | End: 2025-02-14 | Stop reason: WASHOUT

## 2025-02-14 RX ORDER — SODIUM CHLORIDE 9 MG/ML
20 INJECTION, SOLUTION INTRAVENOUS ONCE
Status: COMPLETED | OUTPATIENT
Start: 2025-02-14 | End: 2025-02-14

## 2025-02-14 RX ORDER — METHYLPREDNISOLONE SODIUM SUCCINATE 125 MG/2ML
125 INJECTION, POWDER, LYOPHILIZED, FOR SOLUTION INTRAMUSCULAR; INTRAVENOUS ONCE
Status: COMPLETED | OUTPATIENT
Start: 2025-02-14 | End: 2025-02-14

## 2025-02-14 RX ORDER — CYANOCOBALAMIN 1000 UG/ML
1000 INJECTION, SOLUTION INTRAMUSCULAR; SUBCUTANEOUS ONCE
OUTPATIENT
Start: 2025-02-21 | End: 2025-02-21

## 2025-02-14 RX ORDER — DIPHENHYDRAMINE HYDROCHLORIDE 50 MG/ML
25 INJECTION INTRAMUSCULAR; INTRAVENOUS ONCE
Status: COMPLETED | OUTPATIENT
Start: 2025-02-14 | End: 2025-02-14

## 2025-02-14 RX ORDER — FAMOTIDINE 10 MG/ML
20 INJECTION, SOLUTION INTRAVENOUS ONCE
Status: COMPLETED | OUTPATIENT
Start: 2025-02-14 | End: 2025-02-14

## 2025-02-14 RX ORDER — CYANOCOBALAMIN 1000 UG/ML
1000 INJECTION, SOLUTION INTRAMUSCULAR; SUBCUTANEOUS ONCE
Status: COMPLETED | OUTPATIENT
Start: 2025-02-14 | End: 2025-02-14

## 2025-02-14 RX ADMIN — METHYLPREDNISOLONE SODIUM SUCCINATE 125 MG: 125 INJECTION, POWDER, FOR SOLUTION INTRAMUSCULAR; INTRAVENOUS at 18:57

## 2025-02-14 RX ADMIN — SODIUM CHLORIDE 20 ML/HR: 0.9 INJECTION, SOLUTION INTRAVENOUS at 16:10

## 2025-02-14 RX ADMIN — FAMOTIDINE 20 MG: 10 INJECTION, SOLUTION INTRAVENOUS at 18:59

## 2025-02-14 RX ADMIN — DIPHENHYDRAMINE HYDROCHLORIDE 25 MG: 50 INJECTION, SOLUTION INTRAMUSCULAR; INTRAVENOUS at 18:58

## 2025-02-14 RX ADMIN — CYANOCOBALAMIN 1000 MCG: 1000 INJECTION, SOLUTION INTRAMUSCULAR at 16:08

## 2025-02-14 RX ADMIN — IRON SUCROSE 300 MG: 20 INJECTION, SOLUTION INTRAVENOUS at 16:05

## 2025-02-14 NOTE — PROGRESS NOTES
Patient presents to [facility] for Venofer and B12 injection. Patient offers no complaints at this time. PIV placed in R arm with positive blood return. B12 administered into R deltoid.

## 2025-02-15 NOTE — DISCHARGE INSTRUCTIONS
You were evaluated in the emergency department for an allergic reaction.  You were given Solu-Medrol, Pepcid, Benadryl.  You can take Benadryl at home for itching.  It is important to return to the emergency department if you have throat swelling, shortness of breath, wheezing, or if your symptoms return.

## 2025-02-15 NOTE — ED PROVIDER NOTES
Time reflects when diagnosis was documented in both MDM as applicable and the Disposition within this note       Time User Action Codes Description Comment    2/14/2025  8:20 PM Delmer Camarillo Add [T78.40XA] Allergic reaction, initial encounter           ED Disposition       ED Disposition   Discharge    Condition   Stable    Date/Time   Fri Feb 14, 2025  8:20 PM    Comment   Russ Fajardo discharge to home/self care.                   Assessment & Plan       Medical Decision Making  24-year-old female presents ED for evaluation of allergic reaction status post iron infusion.  On exam, patient with tachycardia upon vital signs.  No acute distress.  She is saturating well on room air.  She has clear bilateral breath sounds with no wheezing.  Oropharynx is clear.  No rashes.  Differential diagnosis: Allergic reaction.  Doubt anaphylaxis.  Plan: Will give patient Solu-Medrol, Pepcid, Benadryl IV and reevaluate.  Reassessment: After above medications, patient reports symptoms improved.  Will discharge to home.  Please see ED course for additional information    Risk  Prescription drug management.        ED Course as of 02/14/25 2050 Fri Feb 14, 2025 1925 Upon reevaluation, patient reports symptoms are stable and have not worsened.   2019 On reevaluation, patient reports that she no longer has pruritus.  Normal respiratory effort and no wheezing.  Vital signs stable.  Patient stable for discharge.  Strict return precautions discussed with patient including throat swelling, shortness of breath, wheezing.  Patient agreeable to plan       Medications   diphenhydrAMINE (BENADRYL) injection 25 mg (25 mg Intravenous Given 2/14/25 1858)   Famotidine (PF) (PEPCID) injection 20 mg (20 mg Intravenous Given 2/14/25 1859)   methylPREDNISolone sodium succinate (Solu-MEDROL) injection 125 mg (125 mg Intravenous Given 2/14/25 1857)       ED Risk Strat Scores                            SBIRT 22yo+      Flowsheet Row Most Recent  Value   Initial Alcohol Screen: US AUDIT-C     1. How often do you have a drink containing alcohol? 0 Filed at: 02/14/2025 1846   2. How many drinks containing alcohol do you have on a typical day you are drinking?  0 Filed at: 02/14/2025 1846   3b. FEMALE Any Age, or MALE 65+: How often do you have 4 or more drinks on one occassion? 0 Filed at: 02/14/2025 1846   Audit-C Score 0 Filed at: 02/14/2025 1846   KAYKAY: How many times in the past year have you...    Used an illegal drug or used a prescription medication for non-medical reasons? Never Filed at: 02/14/2025 1846                            History of Present Illness       Chief Complaint   Patient presents with    Allergic Reaction     Pt states she has had 3 iron infusion, 1 being today. Pt states that she has difficulty swallowing and everything is burning and itching.        Past Medical History:   Diagnosis Date    Anemia     Constipation     Hyperthyroidism     Pilonidal cyst     Wears glasses       Past Surgical History:   Procedure Laterality Date    INCISION AND DRAINAGE ABSCESS ANAL      Pilonidal Cyst  4 times-cut & drained in office-reoccurred    NE EXCISION PILONIDAL CYST/SINUS COMPLICATED N/A 5/23/2019    Procedure: EXCISION PILONIDAL CYST;  Surgeon: Paola Chavarria MD;  Location: [facility];  Service: General    NE EXCISION PILONIDAL CYST/SINUS SIMPLE N/A 4/5/2018    Procedure: EXCISION PILONIDAL CYST and debriding;  Surgeon: Paola Chavarria MD;  Location: [facility];  Service: General      Family History   Problem Relation Age of Onset    No Known Problems Mother     No Known Problems Father     Asthma Sister     Crohn's disease Brother     Cancer Maternal Grandmother     Diabetes Maternal Grandmother     Heart disease Maternal Grandmother     Hypertension Maternal Grandmother     Breast cancer Maternal Grandmother     Arthritis Maternal Grandmother     Cancer Paternal Grandmother     Cancer Cousin     Cancer Other     Cancer Other     Cervical  cancer Neg Hx     Colon cancer Neg Hx       Social History     Tobacco Use    Smoking status: Never     Passive exposure: Past    Smokeless tobacco: Never   Vaping Use    Vaping status: Never Used   Substance Use Topics    Alcohol use: Yes     Comment: socially    Drug use: No      E-Cigarette/Vaping    E-Cigarette Use Never User       E-Cigarette/Vaping Substances    Nicotine No     THC No     CBD No     Flavoring No     Other No     Unknown No       I have reviewed and agree with the history as documented.     24-year-old female presents to ED for evaluation of allergic reaction.  Patient states that she completed an iron infusion when she began to feel tingling and swelling in her throat.  She also reports generalized pruritus, tingling and swelling of the hands.  She denies trouble breathing, chest pain, cough.  Patient states that she has not had a similar reaction before.  Denies a history of allergies.  She states that she took Benadryl orally prior to arrival.        Review of Systems   HENT:  Positive for sore throat. Negative for trouble swallowing and voice change.    Respiratory:  Negative for cough, shortness of breath and wheezing.    Gastrointestinal:  Negative for abdominal pain, nausea and vomiting.   Skin:  Positive for color change.        Pruritus   Neurological:  Negative for headaches.           Objective       ED Triage Vitals [02/14/25 1845]   Temperature Pulse Blood Pressure Respirations SpO2 Patient Position - Orthostatic VS   98 °F (36.7 °C) (!) 110 121/84 16 100 % Sitting      Temp Source Heart Rate Source BP Location FiO2 (%) Pain Score    Oral Monitor Left arm -- No Pain      Vitals      Date and Time Temp Pulse SpO2 Resp BP Pain Score FACES Pain Rating User   02/14/25 2000 -- 87 99 % 21 124/80 -- -- RS   02/14/25 1930 -- 88 99 % 19 127/81 -- -- SR   02/14/25 1926 -- 89 100 % 25 129/94 -- -- RS   02/14/25 1902 -- 92 100 % 18 137/88 -- -- LZ   02/14/25 1845 98 °F (36.7 °C) 110 100 % 16  121/84 No Pain -- JS            Physical Exam  Vitals and nursing note reviewed.   Constitutional:       General: She is not in acute distress.     Appearance: Normal appearance. She is normal weight. She is not ill-appearing, toxic-appearing or diaphoretic.   HENT:      Head: Normocephalic and atraumatic.      Nose: No rhinorrhea.      Mouth/Throat:      Mouth: Mucous membranes are moist.      Pharynx: Oropharynx is clear. No oropharyngeal exudate or posterior oropharyngeal erythema.   Eyes:      Extraocular Movements: Extraocular movements intact.      Conjunctiva/sclera: Conjunctivae normal.   Cardiovascular:      Rate and Rhythm: Normal rate and regular rhythm.      Heart sounds: Normal heart sounds.   Pulmonary:      Effort: Pulmonary effort is normal. No respiratory distress.      Breath sounds: Normal breath sounds. No wheezing.   Abdominal:      Palpations: Abdomen is soft.      Tenderness: There is no abdominal tenderness.   Musculoskeletal:      Cervical back: Neck supple.   Skin:     General: Skin is warm and dry.      Capillary Refill: Capillary refill takes less than 2 seconds.      Findings: No rash.   Neurological:      Mental Status: She is alert and oriented to person, place, and time.         Results Reviewed       None            No orders to display       Procedures    ED Medication and Procedure Management   Prior to Admission Medications   Prescriptions Last Dose Informant Patient Reported? Taking?   Sodium Fluoride 5000 PPM 1.1 % PSTE  Self Yes No   Si (two) times a day   cyclobenzaprine (FLEXERIL) 5 mg tablet   No No   Sig: TAKE 1 TABLET (5 MG TOTAL) BY MOUTH 2 (TWO) TIMES A DAY AS NEEDED FOR MUSCLE SPASMS.   linaCLOtide 290 MCG CAPS   No No   Sig: Take 1 capsule by mouth daily before breakfast 30-60 minutes before breakfast   meloxicam (Mobic) 7.5 mg tablet  Self No No   Sig: Take 1 tablet (7.5 mg total) by mouth 2 (two) times a day as needed for moderate pain   methimazole (TAPAZOLE) 5  mg tablet  Self No No   Sig: TAKE 2 TABLETS BY MOUTH EVERY DAY   norethindrone-ethinyl estradiol-iron (Loestrin Fe 1.5/30) 1.5-30 MG-MCG tablet   No No   Sig: Take 1 tablet by mouth daily   omeprazole (PriLOSEC) 40 MG capsule  Self No No   Sig: Take 1 capsule (40 mg total) by mouth daily 30 minutes before breakfast   ondansetron (ZOFRAN) 4 mg tablet  Self No No   Sig: Take 1 tablet (4 mg total) by mouth every 8 (eight) hours as needed for nausea or vomiting   polyethylene glycol (MIRALAX) 17 g packet  Self No No   Sig: Take 17 g by mouth daily As needed for constipation   Patient not taking: Reported on 12/31/2024   pregabalin (LYRICA) 50 mg capsule  Self No No   Sig: Take 1 capsule (50 mg total) by mouth 2 (two) times a day   propranolol (INDERAL) 40 mg tablet  Self No No   Sig: TAKE 1 TABLET BY MOUTH TWICE A DAY   valACYclovir (VALTREX) 1,000 mg tablet   No No   Sig: Take two tablets (2000 mg) twice daily for one day. Repeat with each episode.   vitamin B-12 (VITAMIN B-12) 500 mcg tablet   No No   Sig: Take 1 tablet (500 mcg total) by mouth daily      Facility-Administered Medications: None     Discharge Medication List as of 2/14/2025  8:22 PM        CONTINUE these medications which have NOT CHANGED    Details   cyclobenzaprine (FLEXERIL) 5 mg tablet TAKE 1 TABLET (5 MG TOTAL) BY MOUTH 2 (TWO) TIMES A DAY AS NEEDED FOR MUSCLE SPASMS., Starting Tue 2/11/2025, Until Sun 8/10/2025 at 2359, Normal      linaCLOtide 290 MCG CAPS Take 1 capsule by mouth daily before breakfast 30-60 minutes before breakfast, Starting Thu 1/30/2025, Normal      meloxicam (Mobic) 7.5 mg tablet Take 1 tablet (7.5 mg total) by mouth 2 (two) times a day as needed for moderate pain, Starting Thu 5/23/2024, Until Thu 1/23/2025 at 2359, Normal      methimazole (TAPAZOLE) 5 mg tablet TAKE 2 TABLETS BY MOUTH EVERY DAY, Normal      norethindrone-ethinyl estradiol-iron (Loestrin Fe 1.5/30) 1.5-30 MG-MCG tablet Take 1 tablet by mouth daily, Starting  Thu 1/23/2025, Normal      omeprazole (PriLOSEC) 40 MG capsule Take 1 capsule (40 mg total) by mouth daily 30 minutes before breakfast, Starting Thu 7/25/2024, Normal      ondansetron (ZOFRAN) 4 mg tablet Take 1 tablet (4 mg total) by mouth every 8 (eight) hours as needed for nausea or vomiting, Starting u 7/25/2024, Normal      polyethylene glycol (MIRALAX) 17 g packet Take 17 g by mouth daily As needed for constipation, Starting u 7/25/2024, Normal      pregabalin (LYRICA) 50 mg capsule Take 1 capsule (50 mg total) by mouth 2 (two) times a day, Starting Thu 12/5/2024, Until Tue 6/3/2025, Normal      propranolol (INDERAL) 40 mg tablet TAKE 1 TABLET BY MOUTH TWICE A DAY, Starting Thu 1/2/2025, Normal      Sodium Fluoride 5000 PPM 1.1 % PSTE 2 (two) times a day, Starting Thu 2/22/2024, Historical Med      valACYclovir (VALTREX) 1,000 mg tablet Take two tablets (2000 mg) twice daily for one day. Repeat with each episode., Normal      vitamin B-12 (VITAMIN B-12) 500 mcg tablet Take 1 tablet (500 mcg total) by mouth daily, Starting Mon 1/13/2025, Normal           No discharge procedures on file.  ED SEPSIS DOCUMENTATION   Time reflects when diagnosis was documented in both MDM as applicable and the Disposition within this note       Time User Action Codes Description Comment    2/14/2025  8:20 PM Delmer Camarillo Add [T78.40XA] Allergic reaction, initial encounter                  Delmer Camarillo DO  02/14/25 2050

## 2025-02-17 ENCOUNTER — TELEPHONE (OUTPATIENT)
Dept: HEMATOLOGY ONCOLOGY | Facility: CLINIC | Age: 25
End: 2025-02-17

## 2025-02-19 ENCOUNTER — TELEPHONE (OUTPATIENT)
Age: 25
End: 2025-02-19

## 2025-02-19 ENCOUNTER — OFFICE VISIT (OUTPATIENT)
Dept: OBGYN CLINIC | Facility: CLINIC | Age: 25
End: 2025-02-19
Payer: COMMERCIAL

## 2025-02-19 VITALS
SYSTOLIC BLOOD PRESSURE: 130 MMHG | WEIGHT: 116 LBS | DIASTOLIC BLOOD PRESSURE: 82 MMHG | HEIGHT: 61 IN | BODY MASS INDEX: 21.9 KG/M2

## 2025-02-19 DIAGNOSIS — Z11.3 SCREEN FOR STD (SEXUALLY TRANSMITTED DISEASE): ICD-10-CM

## 2025-02-19 DIAGNOSIS — N90.89 VULVAR IRRITATION: Primary | ICD-10-CM

## 2025-02-19 LAB
CLUE CELLS SPEC QL WET PREP: NORMAL
T VAGINALIS VAG QL WET PREP: NORMAL
YEAST VAG QL WET PREP: NORMAL

## 2025-02-19 PROCEDURE — 87591 N.GONORRHOEAE DNA AMP PROB: CPT | Performed by: PHYSICIAN ASSISTANT

## 2025-02-19 PROCEDURE — 99213 OFFICE O/P EST LOW 20 MIN: CPT | Performed by: PHYSICIAN ASSISTANT

## 2025-02-19 PROCEDURE — 87210 SMEAR WET MOUNT SALINE/INK: CPT | Performed by: PHYSICIAN ASSISTANT

## 2025-02-19 PROCEDURE — 87491 CHLMYD TRACH DNA AMP PROBE: CPT | Performed by: PHYSICIAN ASSISTANT

## 2025-02-19 RX ORDER — TRIAMCINOLONE ACETONIDE 1 MG/G
OINTMENT TOPICAL 2 TIMES DAILY
Qty: 15 G | Refills: 0 | Status: SHIPPED | OUTPATIENT
Start: 2025-02-19 | End: 2025-03-01

## 2025-02-19 NOTE — PROGRESS NOTES
Name: Russ Fajardo      : 2000      MRN: [id number]  Encounter Provider: Candice Philippe PA-C  Encounter Date: 2025   Encounter department: [facility] [place]  :  Assessment & Plan  Vulvar irritation  - wet mount negative  -Perineal hygiene reviewed.  She is using unscented soaps and detergents.  Advise she switch to organic pantiliners, pads, tampons  -Will give course of topical Kenalog cream to be used twice daily for the next 7 to 10 days.  If no improvement and irritation advised to call the office  Orders:    POCT wet mount    triamcinolone (KENALOG) 0.1 % ointment; Apply topically 2 (two) times a day for 10 days    Screen for STD (sexually transmitted disease)  -STI cultures obtained  Orders:    Chlamydia/GC amplified DNA by PCR        History of Present Illness   HPI  Russ Fajardo is a 24 y.o. female who presents to the office today for a problem visit.  Woke up this morning with vulvar irritation, burning.  Notes brownish-red discharge.  Also notes a small pustule/pimple like lesion at clitoral king.    She did just restart birth control pills this month so is not surprised to have some breakthrough light spotting.  The irritation is what concerned patient.  Notes external irritation only.  Reports some burning with urination due to urine contacting irritated skin    She would like STI cultures.    Denies abdominal pain, fever, chills true dysuria, hematuria, urgency, frequency, vaginal odor.        Review of Systems   Constitutional: Negative.    Respiratory: Negative.     Cardiovascular: Negative.    Gastrointestinal: Negative.    Genitourinary:  Positive for vaginal bleeding. Negative for dysuria, frequency, hematuria, pelvic pain, urgency and vaginal discharge.        Vulvar irritation     Musculoskeletal: Negative.    Skin: Negative.    Psychiatric/Behavioral: Negative.            Objective   /82 (BP Location: Left arm, Patient Position:  "Sitting, Cuff Size: Standard)   Ht 5' 1\" (1.549 m)   Wt 52.6 kg (116 lb)   LMP 02/06/2025   BMI 21.92 kg/m²      Physical Exam  Vitals and nursing note reviewed.   Constitutional:       General: She is not in acute distress.     Appearance: Normal appearance. She is well-developed.   HENT:      Head: Normocephalic and atraumatic.   Eyes:      Conjunctiva/sclera: Conjunctivae normal.   Pulmonary:      Effort: Pulmonary effort is normal. No respiratory distress.   Abdominal:      General: Abdomen is flat. There is no distension.   Genitourinary:     General: Normal vulva.      Exam position: Lithotomy position.      Vagina: Vaginal discharge (brown discharge) present.      Cervix: No cervical motion tenderness, discharge, lesion or cervical bleeding.      Uterus: Not enlarged and not tender.       Adnexa:         Right: No mass, tenderness or fullness.          Left: No mass, tenderness or fullness.         Musculoskeletal:      Cervical back: Neck supple.      Right lower leg: No edema.      Left lower leg: No edema.   Skin:     General: Skin is warm and dry.   Neurological:      General: No focal deficit present.      Mental Status: She is alert. Mental status is at baseline.   Psychiatric:         Mood and Affect: Mood normal.         Behavior: Behavior normal.         Thought Content: Thought content normal.         Judgment: Judgment normal.           "

## 2025-02-19 NOTE — TELEPHONE ENCOUNTER
Pt c/o vag discharge, irritation and burning, requests appt today. Reviewed schedules with DEANA CASTANEDA, nothing available for today, offered visits for tomorrow, but pt works tomorrow. Message sent to office for pt to be contacted for cx today.

## 2025-02-20 NOTE — ED ATTENDING ATTESTATION
"I saw and evaluated the patient. I have discussed the patient with the resident physician and agree with the resident's findings, assessment and plan as documented in the resident physician's note, unless otherwise documented below. All available laboratory and imaging studies were reviewed by myself.  I was present for key portions of any procedure(s) performed by the resident and I was immediately available to provide assistance.     I agree with the current assessment done in the Emergency Department. I have conducted an independent evaluation of this patient    Final Diagnosis:  1. Allergic reaction, initial encounter            Chief Complaint   Patient presents with    Allergic Reaction     Pt states she has had 3 iron infusion, 1 being today. Pt states that she has difficulty swallowing and everything is burning and itching.      This is a 24 y.o. female presenting for evaluation of possible allergic reaction. Patient had iron infusion today, and after developed trouble swallowing, describes \"feeling like [her] whole body is burning\" and having diffuse itching. Denies fever, chills, cough, chest pain, SOB, wheezing, n/v/d, abdominal pain, headache, any other complaints.    PMH:   has a past medical history of Anemia, Constipation, Hyperthyroidism, Pilonidal cyst, and Wears glasses.    PSH:   has a past surgical history that includes Incision and drainage abscess anal; pr excision pilonidal cyst/sinus simple (N/A, 4/5/2018); and pr excision pilonidal cyst/sinus complicated (N/A, 5/23/2019).    Social:  Social History     Substance and Sexual Activity   Alcohol Use Yes    Comment: socially     Social History     Tobacco Use   Smoking Status Never    Passive exposure: Past   Smokeless Tobacco Never     Social History     Substance and Sexual Activity   Drug Use No     PE:  Vitals:    02/14/25 1902 02/14/25 1926 02/14/25 1930 02/14/25 2000   BP: 137/88 129/94 127/81 124/80   BP Location: Right arm      Pulse: 92 " 89 88 87   Resp: 18 (!) 25 19 21   Temp:       TempSrc:       SpO2: 100% 100% 99% 99%           Physical exam:  GENERAL APPEARANCE: Appears anxious  NEURO: GCS 15, no gross focal deficits, cranial nerves grossly intact, clear fluent speech, no facial asymmetry   HEENT: Normocephalic, atraumatic, moist mucous membranes   Neck: Supple, full ROM  CV: RRR, no murmurs, rubs, or gallops  LUNGS: CTAB, no wheezing, rales, or rhonchi  GI: Abdomen soft, non-tender, no rebound or guarding   MSK: Extremities non-tender, no pitting edema  SKIN: Warm and dry      Assessment and plan: This is a 24 y.o. female presenting for evaluation of possible allergic reaction. Normal exam but patient still says she feels symptomatic. Will treat with Benadryl, Pepcid, steroids.          Final assessment: Patient feels improved and feels okay returning home. Strict ED return precautions provided should symptoms worsen and patient can otherwise follow up outpatient. Patient expresses an understanding and agreement with the plan and remains in good condition for discharge.       Code Status: No Order  Advance Directive and Living Will:      Power of :    POLST:      Medications   diphenhydrAMINE (BENADRYL) injection 25 mg (25 mg Intravenous Given 2/14/25 1858)   Famotidine (PF) (PEPCID) injection 20 mg (20 mg Intravenous Given 2/14/25 1859)   methylPREDNISolone sodium succinate (Solu-MEDROL) injection 125 mg (125 mg Intravenous Given 2/14/25 1857)     No orders to display     Orders Placed This Encounter   Procedures    Continuous cardiac monitoring    Continuous pulse oximetry     Labs Reviewed - No data to display      Time reflects when diagnosis was documented in both MDM as applicable and the Disposition within this note       Time User Action Codes Description Comment    2/14/2025  8:20 PM Delmer Camarillo [T78.40XA] Allergic reaction, initial encounter           ED Disposition       ED Disposition   Discharge    Condition    Stable    Date/Time   Fri Feb 14, 2025  8:20 PM    Comment   Russ Fajardo discharge to home/self care.                   Follow-up Information    None       Discharge Medication List as of 2/14/2025  8:22 PM        CONTINUE these medications which have NOT CHANGED    Details   cyclobenzaprine (FLEXERIL) 5 mg tablet TAKE 1 TABLET (5 MG TOTAL) BY MOUTH 2 (TWO) TIMES A DAY AS NEEDED FOR MUSCLE SPASMS., Starting Tue 2/11/2025, Until Sun 8/10/2025 at 2359, Normal      linaCLOtide 290 MCG CAPS Take 1 capsule by mouth daily before breakfast 30-60 minutes before breakfast, Starting Thu 1/30/2025, Normal      meloxicam (Mobic) 7.5 mg tablet Take 1 tablet (7.5 mg total) by mouth 2 (two) times a day as needed for moderate pain, Starting Thu 5/23/2024, Until Thu 1/23/2025 at 2359, Normal      methimazole (TAPAZOLE) 5 mg tablet TAKE 2 TABLETS BY MOUTH EVERY DAY, Normal      norethindrone-ethinyl estradiol-iron (Loestrin Fe 1.5/30) 1.5-30 MG-MCG tablet Take 1 tablet by mouth daily, Starting Thu 1/23/2025, Normal      omeprazole (PriLOSEC) 40 MG capsule Take 1 capsule (40 mg total) by mouth daily 30 minutes before breakfast, Starting Thu 7/25/2024, Normal      ondansetron (ZOFRAN) 4 mg tablet Take 1 tablet (4 mg total) by mouth every 8 (eight) hours as needed for nausea or vomiting, Starting Thu 7/25/2024, Normal      polyethylene glycol (MIRALAX) 17 g packet Take 17 g by mouth daily As needed for constipation, Starting Thu 7/25/2024, Normal      pregabalin (LYRICA) 50 mg capsule Take 1 capsule (50 mg total) by mouth 2 (two) times a day, Starting Thu 12/5/2024, Until Tue 6/3/2025, Normal      propranolol (INDERAL) 40 mg tablet TAKE 1 TABLET BY MOUTH TWICE A DAY, Starting Thu 1/2/2025, Normal      Sodium Fluoride 5000 PPM 1.1 % PSTE 2 (two) times a day, Starting Thu 2/22/2024, Historical Med      valACYclovir (VALTREX) 1,000 mg tablet Take two tablets (2000 mg) twice daily for one day. Repeat with each episode., Normal       vitamin B-12 (VITAMIN B-12) 500 mcg tablet Take 1 tablet (500 mcg total) by mouth daily, Starting Mon 2025, Normal           No discharge procedures on file.  Prior to Admission Medications   Prescriptions Last Dose Informant Patient Reported? Taking?   Sodium Fluoride 5000 PPM 1.1 % PSTE  Self Yes No   Si (two) times a day   cyclobenzaprine (FLEXERIL) 5 mg tablet   No No   Sig: TAKE 1 TABLET (5 MG TOTAL) BY MOUTH 2 (TWO) TIMES A DAY AS NEEDED FOR MUSCLE SPASMS.   linaCLOtide 290 MCG CAPS   No No   Sig: Take 1 capsule by mouth daily before breakfast 30-60 minutes before breakfast   meloxicam (Mobic) 7.5 mg tablet  Self No No   Sig: Take 1 tablet (7.5 mg total) by mouth 2 (two) times a day as needed for moderate pain   methimazole (TAPAZOLE) 5 mg tablet  Self No No   Sig: TAKE 2 TABLETS BY MOUTH EVERY DAY   norethindrone-ethinyl estradiol-iron (Loestrin Fe ) 1.5-30 MG-MCG tablet   No No   Sig: Take 1 tablet by mouth daily   omeprazole (PriLOSEC) 40 MG capsule  Self No No   Sig: Take 1 capsule (40 mg total) by mouth daily 30 minutes before breakfast   ondansetron (ZOFRAN) 4 mg tablet  Self No No   Sig: Take 1 tablet (4 mg total) by mouth every 8 (eight) hours as needed for nausea or vomiting   polyethylene glycol (MIRALAX) 17 g packet  Self No No   Sig: Take 17 g by mouth daily As needed for constipation   Patient not taking: Reported on 2024   pregabalin (LYRICA) 50 mg capsule  Self No No   Sig: Take 1 capsule (50 mg total) by mouth 2 (two) times a day   propranolol (INDERAL) 40 mg tablet  Self No No   Sig: TAKE 1 TABLET BY MOUTH TWICE A DAY   valACYclovir (VALTREX) 1,000 mg tablet   No No   Sig: Take two tablets (2000 mg) twice daily for one day. Repeat with each episode.   vitamin B-12 (VITAMIN B-12) 500 mcg tablet   No No   Sig: Take 1 tablet (500 mcg total) by mouth daily      Facility-Administered Medications: None         Portions of the record may have been created with voice recognition  "software. Occasional wrong word or \"sound a like\" substitutions may have occurred due to the inherent limitations of voice recognition software. Read the chart carefully and recognize, using context, where substitutions have occurred.    Electronically signed by:  Jazzy Holliday    "

## 2025-02-21 ENCOUNTER — OFFICE VISIT (OUTPATIENT)
Age: 25
End: 2025-02-21
Payer: COMMERCIAL

## 2025-02-21 VITALS
SYSTOLIC BLOOD PRESSURE: 110 MMHG | HEART RATE: 85 BPM | TEMPERATURE: 98.3 F | OXYGEN SATURATION: 98 % | HEIGHT: 61 IN | DIASTOLIC BLOOD PRESSURE: 80 MMHG | WEIGHT: 115 LBS | BODY MASS INDEX: 21.71 KG/M2

## 2025-02-21 DIAGNOSIS — H92.01 EAR DISCOMFORT, RIGHT: ICD-10-CM

## 2025-02-21 DIAGNOSIS — R39.9 UTI SYMPTOMS: Primary | ICD-10-CM

## 2025-02-21 LAB
C TRACH DNA SPEC QL NAA+PROBE: NEGATIVE
N GONORRHOEA DNA SPEC QL NAA+PROBE: NEGATIVE

## 2025-02-21 PROCEDURE — 99213 OFFICE O/P EST LOW 20 MIN: CPT | Performed by: INTERNAL MEDICINE

## 2025-02-21 RX ORDER — LEVOFLOXACIN 500 MG/1
500 TABLET, FILM COATED ORAL EVERY 24 HOURS
Qty: 3 TABLET | Refills: 0 | Status: SHIPPED | OUTPATIENT
Start: 2025-02-21 | End: 2025-02-24

## 2025-02-21 NOTE — PROGRESS NOTES
"Name: Russ Fajardo      : 2000      MRN: [id number]  Encounter Provider: Rina Xiong MD  Encounter Date: 2025   Encounter department: [facility] PRIMARY CARE  :  Assessment & Plan  UTI symptoms    Orders:    levofloxacin (LEVAQUIN) 500 mg tablet; Take 1 tablet (500 mg total) by mouth every 24 hours for 3 days    Ear discomfort, right    Orders:    levofloxacin (LEVAQUIN) 500 mg tablet; Take 1 tablet (500 mg total) by mouth every 24 hours for 3 days           History of Present Illness   Earache     Urinary Tract Infection       Patient is here for sick visit complaining of right ear discomfort that started Couple of days ago.  She reports no fevers or chills no URI.  Recently she has been having episodes of nausea and vomiting.  She she was in the ER with allergic reaction to iron infusion.  She also reports dysuria frequency urgency this time yesterday.  She also is on her cycle last month.  I will start her on treatment.    Review of Systems   HENT:  Positive for ear pain.        Objective   /80 (BP Location: Left arm, Patient Position: Sitting, Cuff Size: Standard)   Pulse 85   Temp 98.3 °F (36.8 °C) (Temporal)   Ht 5' 1\" (1.549 m)   Wt 52.2 kg (115 lb)   LMP 2025   SpO2 98%   BMI 21.73 kg/m²      Physical Exam  HENT:      Right Ear: Tympanic membrane normal.      Mouth/Throat:      Pharynx: No oropharyngeal exudate or posterior oropharyngeal erythema.      Comments: Tonsillar enlargement  Neurological:      Mental Status: She is alert.         "

## 2025-02-25 ENCOUNTER — RESULTS FOLLOW-UP (OUTPATIENT)
Dept: OBGYN CLINIC | Facility: CLINIC | Age: 25
End: 2025-02-25

## 2025-02-25 DIAGNOSIS — E53.8 VITAMIN B12 DEFICIENCY: ICD-10-CM

## 2025-02-26 RX ORDER — CALCIUM CARB/VIT D3/MINERALS 600 MG-200
500 TABLET,CHEWABLE ORAL DAILY
Qty: 90 TABLET | Refills: 0 | Status: SHIPPED | OUTPATIENT
Start: 2025-02-26

## 2025-03-05 ENCOUNTER — HOSPITAL ENCOUNTER (EMERGENCY)
Facility: HOSPITAL | Age: 25
Discharge: HOME | End: 2025-03-05
Attending: EMERGENCY MEDICINE
Payer: COMMERCIAL

## 2025-03-05 ENCOUNTER — APPOINTMENT (EMERGENCY)
Dept: RADIOLOGY | Facility: HOSPITAL | Age: 25
End: 2025-03-05
Payer: COMMERCIAL

## 2025-03-05 VITALS
SYSTOLIC BLOOD PRESSURE: 128 MMHG | RESPIRATION RATE: 18 BRPM | BODY MASS INDEX: 20.97 KG/M2 | HEART RATE: 92 BPM | OXYGEN SATURATION: 100 % | TEMPERATURE: 98.2 F | DIASTOLIC BLOOD PRESSURE: 88 MMHG | WEIGHT: 111 LBS

## 2025-03-05 DIAGNOSIS — R10.10 PAIN OF UPPER ABDOMEN: Primary | ICD-10-CM

## 2025-03-05 LAB
ALBUMIN SERPL BCG-MCNC: 4.2 G/DL (ref 3.5–5)
ALP SERPL-CCNC: 44 U/L (ref 34–104)
ALT SERPL W P-5'-P-CCNC: 10 U/L (ref 7–52)
ANION GAP SERPL CALCULATED.3IONS-SCNC: 8 MMOL/L (ref 4–13)
AST SERPL W P-5'-P-CCNC: 11 U/L (ref 13–39)
BASOPHILS # BLD AUTO: 0.02 THOUSANDS/ÂΜL (ref 0–0.1)
BASOPHILS NFR BLD AUTO: 0 % (ref 0–1)
BILIRUB DIRECT SERPL-MCNC: 0.06 MG/DL (ref 0–0.2)
BILIRUB SERPL-MCNC: 0.29 MG/DL (ref 0.2–1)
BUN SERPL-MCNC: 11 MG/DL (ref 5–25)
CALCIUM SERPL-MCNC: 9 MG/DL (ref 8.4–10.2)
CHLORIDE SERPL-SCNC: 102 MMOL/L (ref 96–108)
CO2 SERPL-SCNC: 28 MMOL/L (ref 21–32)
CREAT SERPL-MCNC: 0.62 MG/DL (ref 0.6–1.3)
EOSINOPHIL # BLD AUTO: 0.01 THOUSAND/ÂΜL (ref 0–0.61)
EOSINOPHIL NFR BLD AUTO: 0 % (ref 0–6)
ERYTHROCYTE [DISTWIDTH] IN BLOOD BY AUTOMATED COUNT: 13.3 % (ref 11.6–15.1)
EXT PREGNANCY TEST URINE: NEGATIVE
EXT. CONTROL: NORMAL
GFR SERPL CREATININE-BSD FRML MDRD: 128 ML/MIN/1.73SQ M
GLUCOSE SERPL-MCNC: 80 MG/DL (ref 65–140)
HCT VFR BLD AUTO: 35.6 % (ref 34.8–46.1)
HGB BLD-MCNC: 11.8 G/DL (ref 11.5–15.4)
IMM GRANULOCYTES # BLD AUTO: 0.02 THOUSAND/UL (ref 0–0.2)
IMM GRANULOCYTES NFR BLD AUTO: 0 % (ref 0–2)
LIPASE SERPL-CCNC: 18 U/L (ref 11–82)
LYMPHOCYTES # BLD AUTO: 1.83 THOUSANDS/ÂΜL (ref 0.6–4.47)
LYMPHOCYTES NFR BLD AUTO: 27 % (ref 14–44)
MCH RBC QN AUTO: 30 PG (ref 26.8–34.3)
MCHC RBC AUTO-ENTMCNC: 33.1 G/DL (ref 31.4–37.4)
MCV RBC AUTO: 91 FL (ref 82–98)
MONOCYTES # BLD AUTO: 0.63 THOUSAND/ÂΜL (ref 0.17–1.22)
MONOCYTES NFR BLD AUTO: 9 % (ref 4–12)
NEUTROPHILS # BLD AUTO: 4.34 THOUSANDS/ÂΜL (ref 1.85–7.62)
NEUTS SEG NFR BLD AUTO: 64 % (ref 43–75)
NRBC BLD AUTO-RTO: 0 /100 WBCS
PLATELET # BLD AUTO: 202 THOUSANDS/UL (ref 149–390)
PMV BLD AUTO: 12 FL (ref 8.9–12.7)
POTASSIUM SERPL-SCNC: 3.6 MMOL/L (ref 3.5–5.3)
PROT SERPL-MCNC: 7.5 G/DL (ref 6.4–8.4)
RBC # BLD AUTO: 3.93 MILLION/UL (ref 3.81–5.12)
SODIUM SERPL-SCNC: 138 MMOL/L (ref 135–147)
WBC # BLD AUTO: 6.85 THOUSAND/UL (ref 4.31–10.16)

## 2025-03-05 PROCEDURE — 80048 BASIC METABOLIC PNL TOTAL CA: CPT | Performed by: EMERGENCY MEDICINE

## 2025-03-05 PROCEDURE — 36415 COLL VENOUS BLD VENIPUNCTURE: CPT | Performed by: EMERGENCY MEDICINE

## 2025-03-05 PROCEDURE — 93005 ELECTROCARDIOGRAM TRACING: CPT

## 2025-03-05 PROCEDURE — 80076 HEPATIC FUNCTION PANEL: CPT | Performed by: EMERGENCY MEDICINE

## 2025-03-05 PROCEDURE — 99284 EMERGENCY DEPT VISIT MOD MDM: CPT | Performed by: EMERGENCY MEDICINE

## 2025-03-05 PROCEDURE — 71046 X-RAY EXAM CHEST 2 VIEWS: CPT

## 2025-03-05 PROCEDURE — 85025 COMPLETE CBC W/AUTO DIFF WBC: CPT | Performed by: EMERGENCY MEDICINE

## 2025-03-05 PROCEDURE — 81025 URINE PREGNANCY TEST: CPT | Performed by: EMERGENCY MEDICINE

## 2025-03-05 PROCEDURE — 96375 TX/PRO/DX INJ NEW DRUG ADDON: CPT

## 2025-03-05 PROCEDURE — 83690 ASSAY OF LIPASE: CPT | Performed by: EMERGENCY MEDICINE

## 2025-03-05 PROCEDURE — 99284 EMERGENCY DEPT VISIT MOD MDM: CPT

## 2025-03-05 PROCEDURE — 96374 THER/PROPH/DIAG INJ IV PUSH: CPT

## 2025-03-05 RX ORDER — SUCRALFATE 1 G/1
1 TABLET ORAL ONCE
Status: COMPLETED | OUTPATIENT
Start: 2025-03-05 | End: 2025-03-05

## 2025-03-05 RX ORDER — KETOROLAC TROMETHAMINE 30 MG/ML
15 INJECTION, SOLUTION INTRAMUSCULAR; INTRAVENOUS ONCE
Status: COMPLETED | OUTPATIENT
Start: 2025-03-05 | End: 2025-03-05

## 2025-03-05 RX ORDER — ONDANSETRON 2 MG/ML
4 INJECTION INTRAMUSCULAR; INTRAVENOUS ONCE
Status: COMPLETED | OUTPATIENT
Start: 2025-03-05 | End: 2025-03-05

## 2025-03-05 RX ADMIN — SUCRALFATE 1 G: 1 TABLET ORAL at 14:06

## 2025-03-05 RX ADMIN — ONDANSETRON 4 MG: 2 INJECTION, SOLUTION INTRAMUSCULAR; INTRAVENOUS at 14:10

## 2025-03-05 RX ADMIN — KETOROLAC TROMETHAMINE 15 MG: 30 INJECTION, SOLUTION INTRAMUSCULAR; INTRAVENOUS at 14:11

## 2025-03-05 NOTE — Clinical Note
Russ Fajardo was seen and treated in our emergency department on 3/5/2025.                Diagnosis:     Russ  is off the rest of the shift today.    She may return on this date:          If you have any questions or concerns, please don't hesitate to call.      Yousuf Hunt, DO    ______________________________           _______________          _______________  Hospital Representative                              Date                                Time

## 2025-03-05 NOTE — ED PROVIDER NOTES
Emergency Department Note- Russ Fajardo 24 y.o. female MRN: [id number]    Unit/Bed#: ED 08 Encounter: [id number]      Final Diagnoses:     1. Pain of upper abdomen      ED Course as of 03/05/25 1611   Wed Mar 05, 2025   1359 ECG interpreted by me, sinus rhythm, rate of 98, no acute ischemic or infarctive changes, no acute change from August 8, 2023         HPI:   Patient is a 24-year-old female with a history of anemia, hypothyroidism, previous pilonidal cysts, says that on Saturday, 4 days ago she noticed a slight dull ache in her left upper abdomen and epigastric area, relatively constant, unrelated to food, some nausea but no vomiting.  She says it stayed relatively mild did not bother her very much until yesterday afternoon when he became a little sharper, nausea got worse, she noticed it was now worse when she would lay flat, twist, turn, move around, or take deep breaths.  No relationship to food, no chest pain, no cough, no myalgias or arthralgias.  No diarrhea, no constipation, last bowel movement this morning.  No vaginal bleeding, no vaginal discharge, she is sexually active with a male partner, uses barrier devices for contraception, not taking fertility treatments or attempting to get pregnant.  The patient does say that she has occasionally had some mild epigastric pain on palpation, had been seen by her primary care physician and ordered outpatient imaging which she says was unremarkable.  Patient says that she does have a primary care physician, has not sought medical attention for this current episode of pain.    The patient had an unremarkable right upper quadrant ultrasound on January 12, 2025, has also had an unremarkable CT abdomen and pelvis without contrast on April 14, 2023      MEDICAL DECISION MAKING   At this point the cause of the patient's symptoms is unclear but unlikely represents an acute emergency.  There is no evidence of life-threatening ectopic pregnancy, I do not believe this  is pancreatitis, doubt acute appendicitis, no sign of life threatening dysrhythmia, doubt biliary colic, may be gastric in nature.  Given her benign examination, unremarkable labs, unremarkable ultrasound recently as well as her previous unremarkable CT, I do not believe that at this point repeating the CT or ultrasound is indicated.  While the cause of the patient's complaints is most likely benign, it is possible that this is the early presentation of a more serious condition. This diagnostic uncertainty was discussed with the patient, as was the importance of follow up care, as well as the need to return to immediately return to the closest emergency department for worsening abdominal pain, hematemesis, migration of abdominal pain, the signs/symptoms in the discharge instruction sheets, or they were otherwise concerned about their medical condition. The patient stated they were aware of this diagnostic uncertainty, understood the importance of follow up and were comfortable being discharged.      COLLECTION AND INTERPRETATION OF DATA  I reviewed prior external notes, including outpatient imaging as noted above    I ordered each unique test  Tests reviewed personally by me:  ECG: See my ED course  Labs: See above  Imaging: I independently interpreted the chest x-ray as noted.      Physical:     Vitals:    03/05/25 1312   BP: 128/88   BP Location: Left arm   Pulse: 92   Resp: 18   Temp: 98.2 °F (36.8 °C)   TempSrc: Temporal   SpO2: 100%   Weight: 50.3 kg (111 lb)       General:  Patient is well-appearing  Head:  Atraumatic  Eyes:  Conjunctiva pink  ENT:  Mucous membranes are moist  Neck:  Supple  Cardiac:  S1-S2, without murmurs  Lungs:  Clear to auscultation bilaterally  Abdomen: Abdomen is soft, mild epigastric and left upper abdominal tenderness, no tympany, no rigidity, no guarding, no CVA tenderness.  Extremities:  Normal range of motion  Neurologic:  Awake, fluent speech, normal comprehension, AAOx3  Skin:   Pink warm and dry  Psychiatric:  Alert, pleasant, cooperative        Critical Care Time:   Procedures      Past Medical:    has a past medical history of Anemia, Constipation, Hyperthyroidism, Pilonidal cyst, and Wears glasses.    Past Surgical:    has a past surgical history that includes Incision and drainage abscess anal; pr excision pilonidal cyst/sinus simple (N/A, 4/5/2018); and pr excision pilonidal cyst/sinus complicated (N/A, 5/23/2019).    Social:     Social History     Substance and Sexual Activity   Alcohol Use Yes    Comment: socially     Social History     Tobacco Use   Smoking Status Never    Passive exposure: Past   Smokeless Tobacco Never     Social History     Substance and Sexual Activity   Drug Use No       Outpatient Medications:     No current facility-administered medications on file prior to encounter.     Current Outpatient Medications on File Prior to Encounter   Medication Sig Dispense Refill    [employer] B-12 500 MCG tablet TAKE 1 TABLET BY MOUTH DAILY 90 tablet 0    cyclobenzaprine (FLEXERIL) 5 mg tablet TAKE 1 TABLET (5 MG TOTAL) BY MOUTH 2 (TWO) TIMES A DAY AS NEEDED FOR MUSCLE SPASMS. 60 tablet 8    linaCLOtide 290 MCG CAPS Take 1 capsule by mouth daily before breakfast 30-60 minutes before breakfast 30 capsule 5    meloxicam (Mobic) 7.5 mg tablet Take 1 tablet (7.5 mg total) by mouth 2 (two) times a day as needed for moderate pain 180 tablet 1    methimazole (TAPAZOLE) 5 mg tablet TAKE 2 TABLETS BY MOUTH EVERY  tablet 1    norethindrone-ethinyl estradiol-iron (Loestrin Fe 1.5/30) 1.5-30 MG-MCG tablet Take 1 tablet by mouth daily 90 tablet 3    omeprazole (PriLOSEC) 40 MG capsule Take 1 capsule (40 mg total) by mouth daily 30 minutes before breakfast 90 capsule 1    ondansetron (ZOFRAN) 4 mg tablet Take 1 tablet (4 mg total) by mouth every 8 (eight) hours as needed for nausea or vomiting 30 tablet 2    polyethylene glycol (MIRALAX) 17 g packet Take 17 g by mouth daily As needed for  constipation (Patient not taking: Reported on 2024) 100 each 2    pregabalin (LYRICA) 50 mg capsule Take 1 capsule (50 mg total) by mouth 2 (two) times a day 60 capsule 5    propranolol (INDERAL) 40 mg tablet TAKE 1 TABLET BY MOUTH TWICE A DAY 60 tablet 5    Sodium Fluoride 5000 PPM 1.1 % PSTE 2 (two) times a day      triamcinolone (KENALOG) 0.1 % ointment Apply topically 2 (two) times a day for 10 days 15 g 0    valACYclovir (VALTREX) 1,000 mg tablet Take two tablets (2000 mg) twice daily for one day. Repeat with each episode. 20 tablet 0     Prior to Admission Medications   Prescriptions Last Dose Informant Patient Reported? Taking?   [employer] B-12 500 MCG tablet   No No   Sig: TAKE 1 TABLET BY MOUTH DAILY   Sodium Fluoride 5000 PPM 1.1 % PSTE  Self Yes No   Si (two) times a day   cyclobenzaprine (FLEXERIL) 5 mg tablet   No No   Sig: TAKE 1 TABLET (5 MG TOTAL) BY MOUTH 2 (TWO) TIMES A DAY AS NEEDED FOR MUSCLE SPASMS.   linaCLOtide 290 MCG CAPS   No No   Sig: Take 1 capsule by mouth daily before breakfast 30-60 minutes before breakfast   meloxicam (Mobic) 7.5 mg tablet  Self No No   Sig: Take 1 tablet (7.5 mg total) by mouth 2 (two) times a day as needed for moderate pain   methimazole (TAPAZOLE) 5 mg tablet  Self No No   Sig: TAKE 2 TABLETS BY MOUTH EVERY DAY   norethindrone-ethinyl estradiol-iron (Loestrin Fe 1.5/30) 1.5-30 MG-MCG tablet   No No   Sig: Take 1 tablet by mouth daily   omeprazole (PriLOSEC) 40 MG capsule  Self No No   Sig: Take 1 capsule (40 mg total) by mouth daily 30 minutes before breakfast   ondansetron (ZOFRAN) 4 mg tablet  Self No No   Sig: Take 1 tablet (4 mg total) by mouth every 8 (eight) hours as needed for nausea or vomiting   polyethylene glycol (MIRALAX) 17 g packet  Self No No   Sig: Take 17 g by mouth daily As needed for constipation   Patient not taking: Reported on 2024   pregabalin (LYRICA) 50 mg capsule  Self No No   Sig: Take 1 capsule (50 mg total) by mouth 2 (two)  times a day   propranolol (INDERAL) 40 mg tablet  Self No No   Sig: TAKE 1 TABLET BY MOUTH TWICE A DAY   triamcinolone (KENALOG) 0.1 % ointment   No No   Sig: Apply topically 2 (two) times a day for 10 days   valACYclovir (VALTREX) 1,000 mg tablet   No No   Sig: Take two tablets (2000 mg) twice daily for one day. Repeat with each episode.      Facility-Administered Medications: None           Medications   ondansetron (ZOFRAN) injection 4 mg (4 mg Intravenous Given 3/5/25 1410)   ketorolac (TORADOL) injection 15 mg (15 mg Intravenous Given 3/5/25 1411)   sucralfate (CARAFATE) tablet 1 g (1 g Oral Given 3/5/25 1406)     XR chest pa and lateral   ED Interpretation   PA lateral chest x-ray interpreted me shows no free air under the diaphragm, no pneumothorax, no acute infiltrate or effusion, no acute cardiopulmonary disease      Final Result      No acute cardiopulmonary disease.            Workstation performed: [id number]           Orders Placed This Encounter   Procedures    XR chest pa and lateral    Basic metabolic panel    CBC and differential    Hepatic function panel    Lipase    Insert peripheral IV    POCT pregnancy, urine    ECG 12 lead    ECG 12 lead    ECG 12 lead     Labs Reviewed   HEPATIC FUNCTION PANEL - Abnormal       Result Value Ref Range Status    Total Bilirubin 0.29  0.20 - 1.00 mg/dL Final    Comment: Use of this assay is not recommended for patients undergoing treatment with eltrombopag due to the potential for falsely elevated results.  N-acetyl-p-benzoquinone imine (metabolite of Acetaminophen) will generate erroneously low results in samples for patients that have taken an overdose of Acetaminophen.    Bilirubin, Direct 0.06  0.00 - 0.20 mg/dL Final    Alkaline Phosphatase 44  34 - 104 U/L Final    AST 11 (*) 13 - 39 U/L Final    ALT 10  7 - 52 U/L Final    Comment: Specimen collection should occur prior to Sulfasalazine administration due to the potential for falsely depressed results.      Total Protein 7.5  6.4 - 8.4 g/dL Final    Albumin 4.2  3.5 - 5.0 g/dL Final   LIPASE - Normal    Lipase 18  11 - 82 u/L Final   POCT PREGNANCY, URINE - Normal    EXT Preg Test, Ur Negative   Final    Control Valid   Final   BASIC METABOLIC PANEL    Sodium 138  135 - 147 mmol/L Final    Potassium 3.6  3.5 - 5.3 mmol/L Final    Chloride 102  96 - 108 mmol/L Final    CO2 28  21 - 32 mmol/L Final    ANION GAP 8  4 - 13 mmol/L Final    BUN 11  5 - 25 mg/dL Final    Creatinine 0.62  0.60 - 1.30 mg/dL Final    Comment: Standardized to IDMS reference method    Glucose 80  65 - 140 mg/dL Final    Comment: If the patient is fasting, the ADA then defines impaired fasting glucose as > 100 mg/dL and diabetes as > or equal to 123 mg/dL.    Calcium 9.0  8.4 - 10.2 mg/dL Final    eGFR 128  ml/min/1.73sq m Final    Narrative:     National Kidney [employer] Foundation guidelines for Chronic Kidney Disease (CKD):                     Stage 1 with normal or high GFR (GFR > 90 mL/min/1.73 square meters)                    Stage 2 Mild CKD (GFR = 60-89 mL/min/1.73 square meters)                    Stage 3A Moderate CKD (GFR = 45-59 mL/min/1.73 square meters)                    Stage 3B Moderate CKD (GFR = 30-44 mL/min/1.73 square meters)                    Stage 4 Severe CKD (GFR = 15-29 mL/min/1.73 square meters)                    Stage 5 End Stage CKD (GFR <15 mL/min/1.73 square meters)                  Note: GFR calculation is accurate only with a steady state creatinine   CBC AND DIFFERENTIAL    WBC 6.85  4.31 - 10.16 Thousand/uL Final    RBC 3.93  3.81 - 5.12 Million/uL Final    Hemoglobin 11.8  11.5 - 15.4 g/dL Final    Hematocrit 35.6  34.8 - 46.1 % Final    MCV 91  82 - 98 fL Final    MCH 30.0  26.8 - 34.3 pg Final    MCHC 33.1  31.4 - 37.4 g/dL Final    RDW 13.3  11.6 - 15.1 % Final    MPV 12.0  8.9 - 12.7 fL Final    Platelets 202  149 - 390 Thousands/uL Final    nRBC 0  /100 WBCs Final    Segmented % 64  43 - 75 % Final     Immature Grans % 0  0 - 2 % Final    Lymphocytes % 27  14 - 44 % Final    Monocytes % 9  4 - 12 % Final    Eosinophils Relative 0  0 - 6 % Final    Basophils Relative 0  0 - 1 % Final    Absolute Neutrophils 4.34  1.85 - 7.62 Thousands/µL Final    Absolute Immature Grans 0.02  0.00 - 0.20 Thousand/uL Final    Absolute Lymphocytes 1.83  0.60 - 4.47 Thousands/µL Final    Absolute Monocytes 0.63  0.17 - 1.22 Thousand/µL Final    Eosinophils Absolute 0.01  0.00 - 0.61 Thousand/µL Final    Basophils Absolute 0.02  0.00 - 0.10 Thousands/µL Final     Time reflects when diagnosis was documented in both MDM as applicable and the Disposition within this note       Time User Action Codes Description Comment    3/5/2025  4:00 PM Yousuf Hunt Add [R10.10] Pain of upper abdomen     3/5/2025  4:00 PM Yousuf Hunt Modify [R10.10] Pain of upper abdomen acute          ED Disposition       ED Disposition   Discharge    Condition   Stable    Date/Time   Wed Mar 5, 2025  4:00 PM    Comment   Thongciaraisis Fajardo discharge to home/self care.                   Follow-up Information       Follow up With Specialties Details Why Contact Info    Rina Xiong MD Internal Medicine Schedule an appointment as soon as possible for a visit on 3/10/2025  [address] [place] [id number] [phone]            Discharge Medication List as of 3/5/2025  4:00 PM        CONTINUE these medications which have NOT CHANGED    Details   [employer] B-12 500 MCG tablet TAKE 1 TABLET BY MOUTH DAILY, Starting Wed 2/26/2025, Normal      cyclobenzaprine (FLEXERIL) 5 mg tablet TAKE 1 TABLET (5 MG TOTAL) BY MOUTH 2 (TWO) TIMES A DAY AS NEEDED FOR MUSCLE SPASMS., Starting Tue 2/11/2025, Until Sun 8/10/2025 at 2359, Normal      linaCLOtide 290 MCG CAPS Take 1 capsule by mouth daily before breakfast 30-60 minutes before breakfast, Starting Thu 1/30/2025, Normal      meloxicam (Mobic) 7.5 mg tablet Take 1 tablet (7.5 mg total) by mouth 2 (two) times a day as needed  for moderate pain, Starting Thu 5/23/2024, Until Wed 2/19/2025 at 2359, Normal      methimazole (TAPAZOLE) 5 mg tablet TAKE 2 TABLETS BY MOUTH EVERY DAY, Normal      norethindrone-ethinyl estradiol-iron (Loestrin Fe 1.5/30) 1.5-30 MG-MCG tablet Take 1 tablet by mouth daily, Starting Thu 1/23/2025, Normal      omeprazole (PriLOSEC) 40 MG capsule Take 1 capsule (40 mg total) by mouth daily 30 minutes before breakfast, Starting Thu 7/25/2024, Normal      ondansetron (ZOFRAN) 4 mg tablet Take 1 tablet (4 mg total) by mouth every 8 (eight) hours as needed for nausea or vomiting, Starting Thu 7/25/2024, Normal      polyethylene glycol (MIRALAX) 17 g packet Take 17 g by mouth daily As needed for constipation, Starting Thu 7/25/2024, Normal      pregabalin (LYRICA) 50 mg capsule Take 1 capsule (50 mg total) by mouth 2 (two) times a day, Starting Thu 12/5/2024, Until Tue 6/3/2025, Normal      propranolol (INDERAL) 40 mg tablet TAKE 1 TABLET BY MOUTH TWICE A DAY, Starting Thu 1/2/2025, Normal      Sodium Fluoride 5000 PPM 1.1 % PSTE 2 (two) times a day, Starting Thu 2/22/2024, Historical Med      triamcinolone (KENALOG) 0.1 % ointment Apply topically 2 (two) times a day for 10 days, Starting Wed 2/19/2025, Until Sat 3/1/2025, Normal      valACYclovir (VALTREX) 1,000 mg tablet Take two tablets (2000 mg) twice daily for one day. Repeat with each episode., Normal           No discharge procedures on file.                       Electronically signed by:  MICHELLE Lundberg,   03/05/25 0905

## 2025-03-05 NOTE — DISCHARGE INSTRUCTIONS
Acute Abdominal Pain     DISCHARGE INSTRUCTIONS:   Return to the emergency department if:   Your abdominal pain changes, gets worse or still there in  24 hours  You vomit blood or cannot stop vomiting.    You have blood in your bowel movement or it looks like tar.     You have bleeding from your rectum.     Your abdomen is larger than usual, more painful, and hard.     You stop passing gas and having bowel movements.     You feel weak, dizzy, or faint.  You are concerned about anything else

## 2025-03-06 LAB
ATRIAL RATE: 90 BPM
P AXIS: 36 DEGREES
PR INTERVAL: 128 MS
QRS AXIS: 84 DEGREES
QRSD INTERVAL: 72 MS
QT INTERVAL: 338 MS
QTC INTERVAL: 414 MS
T WAVE AXIS: 34 DEGREES
VENTRICULAR RATE: 90 BPM

## 2025-03-06 PROCEDURE — 93010 ELECTROCARDIOGRAM REPORT: CPT | Performed by: STUDENT IN AN ORGANIZED HEALTH CARE EDUCATION/TRAINING PROGRAM

## 2025-03-07 ENCOUNTER — OFFICE VISIT (OUTPATIENT)
Age: 25
End: 2025-03-07
Payer: COMMERCIAL

## 2025-03-07 VITALS
HEIGHT: 61 IN | SYSTOLIC BLOOD PRESSURE: 120 MMHG | BODY MASS INDEX: 21.52 KG/M2 | TEMPERATURE: 97.6 F | WEIGHT: 114 LBS | DIASTOLIC BLOOD PRESSURE: 80 MMHG

## 2025-03-07 DIAGNOSIS — R10.12 LEFT UPPER QUADRANT ABDOMINAL PAIN: Primary | ICD-10-CM

## 2025-03-07 DIAGNOSIS — Z86.19 HISTORY OF HELICOBACTER PYLORI INFECTION: ICD-10-CM

## 2025-03-07 DIAGNOSIS — R10.816 EPIGASTRIC ABDOMINAL TENDERNESS ON DIRECT PALPATION: ICD-10-CM

## 2025-03-07 DIAGNOSIS — E05.00 GRAVES DISEASE: ICD-10-CM

## 2025-03-07 PROCEDURE — 99214 OFFICE O/P EST MOD 30 MIN: CPT | Performed by: INTERNAL MEDICINE

## 2025-03-07 NOTE — PROGRESS NOTES
"Name: Russ Fajardo      : 2000      MRN: [id number]  Encounter Provider: Rina Xiong MD  Encounter Date: 3/7/2025   Encounter department: [facility] PRIMARY [facility]  :  Assessment & Plan  Left upper quadrant abdominal pain  Patient has been on PPI all this time and has persistent discomfort which is concerning.  Since she is on a PPI stool test would be likely negative for H. pylori.  A CT scan of the abdomen pelvis would be ordered.  Follow-up with GI.    Orders:    CT abdomen pelvis wo contrast; Future    Epigastric abdominal tenderness on direct palpation    Orders:    CT abdomen pelvis wo contrast; Future    History of Helicobacter pylori infection         Graves disease                History of Present Illness   Abdominal Pain      Patient with hyperthyroidism on methimazole Toprol chronic idiopathic constipation is here to follow-up on recent ER visit with epigastric and left upper abdominal discomfort that got worse over the last 1 week prompting her to go to the hospital.  She felt nauseous.  She reports no fever and chills.  No diarrhea.  Nobody else was sick at home.  Lab studies in the ER unremarkable.  To recap she had right upper quadrant ultrasound in the past which was unremarkable.  She had gastric emptying study in the past which was not remarkable EGD had revealed mild inflammation H. pylori came back positive which was treated and she had a follow-up stool test which was negative.  Patient has been taking a PPI all this while.  She has not been taking NSAIDs.  She reports her bowels have been unremarkable.  The pain is so intense that it gets worse even with deep inspiration.  Patient reports no weight loss.   Review of Systems   Gastrointestinal:  Positive for abdominal pain.       Objective   /80 (BP Location: Left arm, Patient Position: Sitting, Cuff Size: Standard)   Temp 97.6 °F (36.4 °C) (Temporal)   Ht 5' 1\" (1.549 m)   Wt 51.7 kg (114 lb)   LMP 2025  " FYI - patient is following up with you in 1 week for recheck  BMI 21.54 kg/m²      Physical Exam  HENT:      Mouth/Throat:      Mouth: Mucous membranes are moist.   Abdominal:      General: There is no distension.      Palpations: There is no mass.      Tenderness: There is abdominal tenderness (Significant epigastric tenderness on palpation as well as left upper quadran.  Negative splenomegaly0). There is no guarding.   Skin:     Coloration: Skin is not jaundiced or pale.   Neurological:      Mental Status: She is alert.

## 2025-03-11 NOTE — PROGRESS NOTES
"Name: Russ Fajardo      : 2000      MRN: [id number]  Encounter Provider: Medina Stevens PA-C  Encounter Date: 3/12/2025   Encounter department: [facility] [place]  :  Assessment & Plan  Chronic idiopathic constipation  High dose linzess was sent in at pt's last appt. The pt admits to not being compliant with this \"for a few weeks\" and when she stopped taking it, she started having upper abdominal pain. the patient recently went to the ER for this abdominal pain as the pain did not get better; lipase, CMP, CBC within normal limits.  Patient was scheduled for CT scan on 3/13 but this appears to be rescheduled for 3/14. Thankfully, she says the pain is \"much better\" than it was last week and she is only feeling it with deep breathing. Colonoscopy about 2 years ago in 2023 was essentially normal with random colon biopsies negative for signs of microscopic colitis or IBD.  -please get CT done: I explained to the pt that we will be able to monitor her stool burden and if there are signs of gastritis, enteritis, etc that could be attributing to her pain  -please re-start linzess 290 mcg daily  -ensure you are drinking at least 64 ounces of water/day        Pain of upper abdomen  See above.   -keep CT appt  -start OTC Ibgard as needed  -Continue omeprazole 40 mg daily for now  -if pain persists and CT is not helpful, would consider repeat EGD           History of Present Illness   HPI  Russ Fajardo is a 24 y.o. female who presents for follow-up.  The pt admits to not being compliant with this \"for a few weeks\" and when she stopped taking it, she started having upper abdominal pain. she says the pain is \"much better\" than it was last week and she is only feeling it with deep breathing.  She says that she was quite backed up when she was not taking her Linzess and that she is only moving her bowels once every few days and only evacuating small amounts.  She denies " diarrhea, nausea or vomiting, trouble swallowing or eating, heartburn, weight changes, fevers, chills, night sweats, bloody or black bowel movements.  History obtained from: patient    Review of Systems   Constitutional:  Negative for chills and fever.   HENT:  Negative for ear pain and sore throat.    Eyes:  Negative for pain and visual disturbance.   Respiratory:  Negative for cough and shortness of breath.    Cardiovascular:  Negative for chest pain and palpitations.   Gastrointestinal:  Positive for abdominal pain and constipation. Negative for abdominal distention, anal bleeding, blood in stool, diarrhea, nausea, rectal pain and vomiting.   Musculoskeletal:  Negative for arthralgias and back pain.   Skin:  Negative for color change and rash.   All other systems reviewed and are negative.    Medical History Reviewed by provider this encounter:     .  Past Medical History   Past Medical History:   Diagnosis Date    Anemia     Constipation     Hyperthyroidism     Pilonidal cyst     Wears glasses      Past Surgical History:   Procedure Laterality Date    INCISION AND DRAINAGE ABSCESS ANAL      Pilonidal Cyst  4 times-cut & drained in office-reoccurred    OH EXCISION PILONIDAL CYST/SINUS COMPLICATED N/A 5/23/2019    Procedure: EXCISION PILONIDAL CYST;  Surgeon: Paola Chavarria MD;  Location: [facility];  Service: General    OH EXCISION PILONIDAL CYST/SINUS SIMPLE N/A 4/5/2018    Procedure: EXCISION PILONIDAL CYST and debriding;  Surgeon: Paola Chavarria MD;  Location: [facility];  Service: General     Family History   Problem Relation Age of Onset    No Known Problems Mother     No Known Problems Father     Asthma Sister     Crohn's disease Brother     Cancer Maternal Grandmother     Diabetes Maternal Grandmother     Heart disease Maternal Grandmother     Hypertension Maternal Grandmother     Breast cancer Maternal Grandmother     Arthritis Maternal Grandmother     Cancer Paternal Grandmother     Cancer Cousin      Cancer Other     Cancer Other     Cervical cancer Neg Hx     Colon cancer Neg Hx       reports that she has never smoked. She has been exposed to tobacco smoke. She has never used smokeless tobacco. She reports current alcohol use. She reports that she does not use drugs.  Current Outpatient Medications   Medication Instructions    [employer] B-12 500 mcg, Oral, Daily    cyclobenzaprine (FLEXERIL) 5 mg, Oral, 2 times daily PRN    linaCLOtide 290 MCG CAPS 1 capsule, Oral, Daily before breakfast, 30-60 minutes before breakfast    meloxicam (MOBIC) 7.5 mg, Oral, 2 times daily PRN    methimazole (TAPAZOLE) 5 mg tablet TAKE 2 TABLETS BY MOUTH EVERY DAY    norethindrone-ethinyl estradiol-iron (Loestrin Fe 1.5/30) 1.5-30 MG-MCG tablet 1 tablet, Oral, Daily    omeprazole (PRILOSEC) 40 mg, Oral, Daily, 30 minutes before breakfast    ondansetron (ZOFRAN) 4 mg, Oral, Every 8 hours PRN    polyethylene glycol (MIRALAX) 17 g, Oral, Daily, As needed for constipation    pregabalin (LYRICA) 50 mg, Oral, 2 times daily    propranolol (INDERAL) 40 mg, Oral, 2 times daily    Sodium Fluoride 5000 PPM 1.1 % PSTE 2 times daily    triamcinolone (KENALOG) 0.1 % ointment Topical, 2 times daily    valACYclovir (VALTREX) 1,000 mg tablet Take two tablets (2000 mg) twice daily for one day. Repeat with each episode.     Allergies   Allergen Reactions    Iron Anaphylaxis    Sulfa Antibiotics Hives      Current Outpatient Medications on File Prior to Visit   Medication Sig Dispense Refill    CVS B-12 500 MCG tablet TAKE 1 TABLET BY MOUTH DAILY 90 tablet 0    cyclobenzaprine (FLEXERIL) 5 mg tablet TAKE 1 TABLET (5 MG TOTAL) BY MOUTH 2 (TWO) TIMES A DAY AS NEEDED FOR MUSCLE SPASMS. 60 tablet 8    linaCLOtide 290 MCG CAPS Take 1 capsule by mouth daily before breakfast 30-60 minutes before breakfast 30 capsule 5    methimazole (TAPAZOLE) 5 mg tablet TAKE 2 TABLETS BY MOUTH EVERY  tablet 1    norethindrone-ethinyl estradiol-iron (Loestrin Fe 1.5/30)  1.5-30 MG-MCG tablet Take 1 tablet by mouth daily 90 tablet 3    omeprazole (PriLOSEC) 40 MG capsule Take 1 capsule (40 mg total) by mouth daily 30 minutes before breakfast 90 capsule 1    ondansetron (ZOFRAN) 4 mg tablet Take 1 tablet (4 mg total) by mouth every 8 (eight) hours as needed for nausea or vomiting 30 tablet 2    pregabalin (LYRICA) 50 mg capsule Take 1 capsule (50 mg total) by mouth 2 (two) times a day 60 capsule 5    propranolol (INDERAL) 40 mg tablet TAKE 1 TABLET BY MOUTH TWICE A DAY 60 tablet 5    Sodium Fluoride 5000 PPM 1.1 % PSTE 2 (two) times a day      valACYclovir (VALTREX) 1,000 mg tablet Take two tablets (2000 mg) twice daily for one day. Repeat with each episode. 20 tablet 0    meloxicam (Mobic) 7.5 mg tablet Take 1 tablet (7.5 mg total) by mouth 2 (two) times a day as needed for moderate pain 180 tablet 1    polyethylene glycol (MIRALAX) 17 g packet Take 17 g by mouth daily As needed for constipation (Patient not taking: Reported on 12/31/2024) 100 each 2    triamcinolone (KENALOG) 0.1 % ointment Apply topically 2 (two) times a day for 10 days 15 g 0     No current facility-administered medications on file prior to visit.      Social History     Tobacco Use    Smoking status: Never     Passive exposure: Past    Smokeless tobacco: Never   Vaping Use    Vaping status: Never Used   Substance and Sexual Activity    Alcohol use: Yes     Comment: socially    Drug use: No    Sexual activity: Yes     Partners: Male     Birth control/protection: Condom Male        Objective   LMP 02/06/2025      Physical Exam  Constitutional:       Appearance: Normal appearance.   Cardiovascular:      Rate and Rhythm: Normal rate and regular rhythm.   Pulmonary:      Breath sounds: Normal breath sounds.   Abdominal:      General: Bowel sounds are normal. There is distension.      Palpations: There is no mass.      Tenderness: There is no abdominal tenderness. There is no guarding or rebound.   Neurological:       Mental Status: She is alert.         Administrative Statements   I have spent a total time of 30 minutes in caring for this patient on the day of the visit/encounter including Diagnostic results, Prognosis, Risks and benefits of tx options, Instructions for management, Patient and family education, Importance of tx compliance, Risk factor reductions, Impressions, Counseling / Coordination of care, Documenting in the medical record, Reviewing/placing orders in the medical record (including tests, medications, and/or procedures), Obtaining or reviewing history  , and Communicating with other healthcare professionals .

## 2025-03-12 ENCOUNTER — OFFICE VISIT (OUTPATIENT)
Dept: GASTROENTEROLOGY | Facility: CLINIC | Age: 25
End: 2025-03-12
Payer: COMMERCIAL

## 2025-03-12 VITALS
DIASTOLIC BLOOD PRESSURE: 70 MMHG | HEIGHT: 61 IN | BODY MASS INDEX: 21.52 KG/M2 | WEIGHT: 114 LBS | TEMPERATURE: 98.6 F | SYSTOLIC BLOOD PRESSURE: 116 MMHG

## 2025-03-12 DIAGNOSIS — R10.10 PAIN OF UPPER ABDOMEN: ICD-10-CM

## 2025-03-12 DIAGNOSIS — K59.04 CHRONIC IDIOPATHIC CONSTIPATION: Primary | ICD-10-CM

## 2025-03-12 PROCEDURE — 99214 OFFICE O/P EST MOD 30 MIN: CPT | Performed by: PHYSICIAN ASSISTANT

## 2025-03-14 ENCOUNTER — HOSPITAL ENCOUNTER (OUTPATIENT)
Dept: RADIOLOGY | Facility: HOSPITAL | Age: 25
Discharge: HOME | End: 2025-03-14
Payer: COMMERCIAL

## 2025-03-14 DIAGNOSIS — R10.816 EPIGASTRIC ABDOMINAL TENDERNESS ON DIRECT PALPATION: ICD-10-CM

## 2025-03-14 DIAGNOSIS — R10.12 LEFT UPPER QUADRANT ABDOMINAL PAIN: ICD-10-CM

## 2025-03-14 PROCEDURE — 74176 CT ABD & PELVIS W/O CONTRAST: CPT

## 2025-03-17 ENCOUNTER — PATIENT MESSAGE (OUTPATIENT)
Dept: OBGYN CLINIC | Facility: CLINIC | Age: 25
End: 2025-03-17

## 2025-03-17 DIAGNOSIS — M79.18 MYOFASCIAL PAIN SYNDROME: ICD-10-CM

## 2025-03-17 DIAGNOSIS — B37.9 YEAST INFECTION: Primary | ICD-10-CM

## 2025-03-17 RX ORDER — MELOXICAM 7.5 MG/1
7.5 TABLET ORAL 2 TIMES DAILY PRN
Qty: 60 TABLET | Refills: 5 | Status: SHIPPED | OUTPATIENT
Start: 2025-03-17

## 2025-03-19 RX ORDER — FLUCONAZOLE 150 MG/1
150 TABLET ORAL ONCE
Qty: 1 TABLET | Refills: 0 | Status: SHIPPED | OUTPATIENT
Start: 2025-03-19 | End: 2025-03-19

## 2025-03-21 ENCOUNTER — RESULTS FOLLOW-UP (OUTPATIENT)
Age: 25
End: 2025-03-21

## 2025-04-11 ENCOUNTER — OFFICE VISIT (OUTPATIENT)
Dept: OBGYN CLINIC | Facility: CLINIC | Age: 25
End: 2025-04-11

## 2025-04-11 VITALS — WEIGHT: 113.2 LBS | SYSTOLIC BLOOD PRESSURE: 108 MMHG | BODY MASS INDEX: 21.39 KG/M2 | DIASTOLIC BLOOD PRESSURE: 64 MMHG

## 2025-04-11 DIAGNOSIS — N76.0 ACUTE VULVOVAGINITIS: Primary | ICD-10-CM

## 2025-04-11 PROCEDURE — 87480 CANDIDA DNA DIR PROBE: CPT | Performed by: PHYSICIAN ASSISTANT

## 2025-04-11 PROCEDURE — 87070 CULTURE OTHR SPECIMN AEROBIC: CPT | Performed by: PHYSICIAN ASSISTANT

## 2025-04-11 PROCEDURE — 87106 FUNGI IDENTIFICATION YEAST: CPT | Performed by: PHYSICIAN ASSISTANT

## 2025-04-11 PROCEDURE — 87660 TRICHOMONAS VAGIN DIR PROBE: CPT | Performed by: PHYSICIAN ASSISTANT

## 2025-04-11 PROCEDURE — 87510 GARDNER VAG DNA DIR PROBE: CPT | Performed by: PHYSICIAN ASSISTANT

## 2025-04-11 RX ORDER — VALACYCLOVIR HYDROCHLORIDE 500 MG/1
500 TABLET, FILM COATED ORAL 2 TIMES DAILY
Qty: 10 TABLET | Refills: 5 | Status: SHIPPED | OUTPATIENT
Start: 2025-04-11 | End: 2025-04-16

## 2025-04-11 NOTE — PROGRESS NOTES
Name: Russ Fajardo      : 2000      MRN: [id number]  Encounter Provider: Sharri Kim PA-C  Encounter Date: 2025   Encounter department: [facility] [place]  :  Assessment & Plan  Acute vulvovaginitis  - cultures taken to r/o infection to see if Diflucan resistant yeast present.  - early HSV outbreak  - recommend take Valtrex. Last outbreak was 5 yrs ago. She states she has been under more stress lately.    Orders:    VAGINOSIS DNA PROBE    Genital Comprehensive Culture    valACYclovir (VALTREX) 500 mg tablet; Take 1 tablet (500 mg total) by mouth 2 (two) times a day for 5 days        History of Present Illness   HPI  Russ Fajardo  25 y.o. presents for vaginitis symptoms.  Patient message on 2025 reviewed.  Pt went to urgent care dx with yeast infection. Prescribed diflucan 1 pill. Pt requested 2 given that she normally needs 2 to treat. Pt took second dosage and still has symptoms.  Pt advised to have a visit.    Prior Vaginitis visits (within last 2 yrs)  - 2025  - 2024  - 2024    Patient's last menstrual period was 2025.    Symptoms present x 1 month.  She states that culture grew yeast at urgent care.  She is having dysuria as urine hits her skin.    Vulvar symptoms present: itching, pain, and burning    New Sexual Partner(s): No    Birth Control: Combined Oral Contraceptive Pills    Vulvar Hygiene Review  - Soaps used with scent or dye:  No      - Vaginal Washes used:    No  - Douches:      No  - Laundry Detergent with scent or dye used: No      History obtained from: patient    Review of Systems       Objective   /64   Wt 51.3 kg (113 lb 3.2 oz)   LMP 2025   BMI 21.39 kg/m²      Physical Exam  Constitutional:       General: She is not in acute distress.     Appearance: She is not ill-appearing.   HENT:      Head: Normocephalic and atraumatic.      Nose: Nose normal.   Eyes:       General: Lids are normal.      Extraocular Movements: Extraocular movements intact.   Genitourinary:     Exam position: Lithotomy position.          Comments: Very early HSV outbreak. Some vesicular lesions and other early papules.  Labia minor and vestibule with redness.    Speculum exam deferred secondary to early HSV outbreak.  Skin:     General: Skin is cool and dry.   Neurological:      General: No focal deficit present.      Mental Status: She is alert and oriented to person, place, and time.   Psychiatric:         Attention and Perception: Attention normal.         Speech: Speech normal.

## 2025-04-12 LAB
CANDIDA RRNA VAG QL PROBE: DETECTED
G VAGINALIS RRNA GENITAL QL PROBE: NOT DETECTED
T VAGINALIS RRNA GENITAL QL PROBE: NOT DETECTED

## 2025-04-13 LAB — BACTERIA GENITAL AEROBE CULT: ABNORMAL

## 2025-04-14 ENCOUNTER — RESULTS FOLLOW-UP (OUTPATIENT)
Dept: OBGYN CLINIC | Facility: CLINIC | Age: 25
End: 2025-04-14

## 2025-04-14 DIAGNOSIS — B37.9 YEAST INFECTION: Primary | ICD-10-CM

## 2025-04-14 LAB
BACTERIA GENITAL AEROBE CULT: ABNORMAL
BACTERIA GENITAL AEROBE CULT: ABNORMAL

## 2025-04-14 RX ORDER — FLUCONAZOLE 150 MG/1
150 TABLET ORAL
Qty: 3 TABLET | Refills: 0 | Status: SHIPPED | OUTPATIENT
Start: 2025-04-14 | End: 2025-04-21

## 2025-04-25 DIAGNOSIS — K29.70 GASTRITIS WITHOUT BLEEDING, UNSPECIFIED CHRONICITY, UNSPECIFIED GASTRITIS TYPE: ICD-10-CM

## 2025-04-25 RX ORDER — OMEPRAZOLE 40 MG/1
40 CAPSULE, DELAYED RELEASE ORAL DAILY
Qty: 30 CAPSULE | Refills: 5 | Status: SHIPPED | OUTPATIENT
Start: 2025-04-25

## 2025-05-16 DIAGNOSIS — A60.04 HERPES SIMPLEX VULVOVAGINITIS: Primary | ICD-10-CM

## 2025-05-16 RX ORDER — VALACYCLOVIR HYDROCHLORIDE 1 G/1
1000 TABLET, FILM COATED ORAL 2 TIMES DAILY
Qty: 20 TABLET | Refills: 0 | Status: SHIPPED | OUTPATIENT
Start: 2025-05-16 | End: 2025-05-26

## 2025-05-22 ENCOUNTER — OFFICE VISIT (OUTPATIENT)
Dept: OBGYN CLINIC | Facility: CLINIC | Age: 25
End: 2025-05-22

## 2025-05-22 ENCOUNTER — APPOINTMENT (OUTPATIENT)
Dept: LAB | Facility: CLINIC | Age: 25
End: 2025-05-22
Payer: COMMERCIAL

## 2025-05-22 VITALS — WEIGHT: 113 LBS | SYSTOLIC BLOOD PRESSURE: 100 MMHG | BODY MASS INDEX: 21.35 KG/M2 | DIASTOLIC BLOOD PRESSURE: 74 MMHG

## 2025-05-22 DIAGNOSIS — D50.8 IRON DEFICIENCY ANEMIA SECONDARY TO INADEQUATE DIETARY IRON INTAKE: ICD-10-CM

## 2025-05-22 DIAGNOSIS — E53.8 FOLATE DEFICIENCY: ICD-10-CM

## 2025-05-22 DIAGNOSIS — R79.0 LOW FERRITIN: ICD-10-CM

## 2025-05-22 DIAGNOSIS — D70.9 NEUTROPENIA, UNSPECIFIED TYPE (HCC): ICD-10-CM

## 2025-05-22 DIAGNOSIS — E53.8 VITAMIN B12 DEFICIENCY: ICD-10-CM

## 2025-05-22 DIAGNOSIS — L24.3 IRRITANT CONTACT DERMATITIS DUE TO COSMETICS: Primary | ICD-10-CM

## 2025-05-22 LAB
BASOPHILS # BLD AUTO: 0.03 THOUSANDS/ÂΜL (ref 0–0.1)
BASOPHILS NFR BLD AUTO: 1 % (ref 0–1)
BV WHIFF TEST VAG QL: NEGATIVE
CLUE CELLS SPEC QL WET PREP: NEGATIVE
EOSINOPHIL # BLD AUTO: 0.01 THOUSAND/ÂΜL (ref 0–0.61)
EOSINOPHIL NFR BLD AUTO: 0 % (ref 0–6)
ERYTHROCYTE [DISTWIDTH] IN BLOOD BY AUTOMATED COUNT: 12.9 % (ref 11.6–15.1)
FERRITIN SERPL-MCNC: 138 NG/ML (ref 30–307)
FOLATE SERPL-MCNC: 8.7 NG/ML
HCT VFR BLD AUTO: 41.5 % (ref 34.8–46.1)
HGB BLD-MCNC: 13.8 G/DL (ref 11.5–15.4)
IMM GRANULOCYTES # BLD AUTO: 0.01 THOUSAND/UL (ref 0–0.2)
IMM GRANULOCYTES NFR BLD AUTO: 0 % (ref 0–2)
IRON SATN MFR SERPL: 30 % (ref 15–50)
IRON SERPL-MCNC: 89 UG/DL (ref 50–212)
LYMPHOCYTES # BLD AUTO: 1.32 THOUSANDS/ÂΜL (ref 0.6–4.47)
LYMPHOCYTES NFR BLD AUTO: 33 % (ref 14–44)
MCH RBC QN AUTO: 31.1 PG (ref 26.8–34.3)
MCHC RBC AUTO-ENTMCNC: 33.3 G/DL (ref 31.4–37.4)
MCV RBC AUTO: 94 FL (ref 82–98)
MONOCYTES # BLD AUTO: 0.26 THOUSAND/ÂΜL (ref 0.17–1.22)
MONOCYTES NFR BLD AUTO: 7 % (ref 4–12)
NEUTROPHILS # BLD AUTO: 2.39 THOUSANDS/ÂΜL (ref 1.85–7.62)
NEUTS SEG NFR BLD AUTO: 59 % (ref 43–75)
NRBC BLD AUTO-RTO: 0 /100 WBCS
PH SMN: 4 [PH]
PLATELET # BLD AUTO: 273 THOUSANDS/UL (ref 149–390)
PMV BLD AUTO: 11.8 FL (ref 8.9–12.7)
RBC # BLD AUTO: 4.44 MILLION/UL (ref 3.81–5.12)
T VAGINALIS VAG QL WET PREP: NEGATIVE
TIBC SERPL-MCNC: 296.8 UG/DL (ref 250–450)
TRANSFERRIN SERPL-MCNC: 212 MG/DL (ref 203–362)
UIBC SERPL-MCNC: 208 UG/DL (ref 155–355)
VIT B12 SERPL-MCNC: 409 PG/ML (ref 180–914)
WBC # BLD AUTO: 4.02 THOUSAND/UL (ref 4.31–10.16)
YEAST VAG QL WET PREP: NEGATIVE

## 2025-05-22 PROCEDURE — 36415 COLL VENOUS BLD VENIPUNCTURE: CPT

## 2025-05-22 PROCEDURE — 82728 ASSAY OF FERRITIN: CPT

## 2025-05-22 PROCEDURE — 82607 VITAMIN B-12: CPT

## 2025-05-22 PROCEDURE — 83540 ASSAY OF IRON: CPT

## 2025-05-22 PROCEDURE — 83550 IRON BINDING TEST: CPT

## 2025-05-22 PROCEDURE — 82746 ASSAY OF FOLIC ACID SERUM: CPT

## 2025-05-22 PROCEDURE — 85025 COMPLETE CBC W/AUTO DIFF WBC: CPT

## 2025-05-22 RX ORDER — TRIAMCINOLONE ACETONIDE 1 MG/G
OINTMENT TOPICAL 2 TIMES DAILY
Qty: 30 G | Refills: 0 | Status: SHIPPED | OUTPATIENT
Start: 2025-05-22

## 2025-05-22 NOTE — PROGRESS NOTES
Name: Russ Fajardo      : 2000      MRN: [id number]  Encounter Provider: Candice Burkett PA-C  Encounter Date: 2025   Encounter department: [facility] [place]  :  Assessment & Plan  Irritant contact dermatitis due to cosmetics  Reviewed hygiene recommendations.  Recommended steroid ointment to reduce itching and irritation.  Otherwise  no products to vulva.  Wet mount negative.  Monitoring advised.  Return to office in 1 to 2 weeks if symptoms persist, sooner as needed.  Orders:    triamcinolone (KENALOG) 0.1 % ointment; Apply topically 2 (two) times a day For up to 2 weeks    POCT wet mount        History of Present Illness   25-year-old female with complaint of vulvar rash times nearly 10 days.  Rash began very shortly after using a new cream to the vulva.  Thought she bought coconut oil, which she has previously utilized for dryness and irritation with good results.  After application noted that this was a cream that included coconut oil as well as other oils and fragrances.   because of the tingling and burning sensation, she treated with Valtrex x 3 days.  With onset of blistering and red rash she is treated with a subsequent 5 days of Valtrex with no improvement.  Now believes that she may have an allergic reaction.  No vaginal discharge, vaginal bleeding, or odor.        Review of Systems   Constitutional:  Negative for fever.   Genitourinary:  Positive for pelvic pain. Negative for difficulty urinating, dysuria, vaginal bleeding and vaginal discharge.   Skin:  Positive for rash.     Medical History Reviewed by provider this encounter:     .  Medications Ordered Prior to Encounter[1]   Social History[2]     Objective   /74 (BP Location: Left arm, Patient Position: Sitting, Cuff Size: Standard)   Wt 51.3 kg (113 lb)   LMP 2025   BMI 21.35 kg/m²      Physical Exam  Vitals and nursing note reviewed.   Constitutional:       General: She  is not in acute distress.     Appearance: Normal appearance. She is not ill-appearing.   HENT:      Head: Normocephalic and atraumatic.     Eyes:      Conjunctiva/sclera: Conjunctivae normal.     Pulmonary:      Effort: Pulmonary effort is normal.   Abdominal:      General: There is no distension.      Palpations: Abdomen is soft.      Tenderness: There is no abdominal tenderness.   Genitourinary:     General: Normal vulva.      Labia:         Left: No rash.       Vagina: Normal.      Cervix: Normal.      Uterus: Normal.       Adnexa: Right adnexa normal and left adnexa normal.      Comments: Papulovesicular erythematous rash of the inferior vulva and perineum with superficial desquamation.  Lymphadenopathy:      Lower Body: Right inguinal adenopathy present.     Skin:     General: Skin is warm and dry.     Neurological:      General: No focal deficit present.      Mental Status: She is alert.      Cranial Nerves: No cranial nerve deficit.     Psychiatric:         Mood and Affect: Mood normal.         Behavior: Behavior normal.            [1]   Current Outpatient Medications on File Prior to Visit   Medication Sig Dispense Refill    CVS B-12 500 MCG tablet TAKE 1 TABLET BY MOUTH DAILY 90 tablet 0    cyclobenzaprine (FLEXERIL) 5 mg tablet TAKE 1 TABLET (5 MG TOTAL) BY MOUTH 2 (TWO) TIMES A DAY AS NEEDED FOR MUSCLE SPASMS. 60 tablet 8    linaCLOtide 290 MCG CAPS Take 1 capsule by mouth daily before breakfast 30-60 minutes before breakfast 30 capsule 5    methimazole (TAPAZOLE) 5 mg tablet TAKE 2 TABLETS BY MOUTH EVERY  tablet 1    norethindrone-ethinyl estradiol-iron (Loestrin Fe 1.5/30) 1.5-30 MG-MCG tablet Take 1 tablet by mouth daily 90 tablet 3    omeprazole (PriLOSEC) 40 MG capsule TAKE 1 CAPSULE (40 MG TOTAL) BY MOUTH DAILY 30 MINUTES BEFORE BREAKFAST 30 capsule 5    ondansetron (ZOFRAN) 4 mg tablet Take 1 tablet (4 mg total) by mouth every 8 (eight) hours as needed for nausea or vomiting 30 tablet 2     pregabalin (LYRICA) 50 mg capsule Take 1 capsule (50 mg total) by mouth 2 (two) times a day 60 capsule 5    propranolol (INDERAL) 40 mg tablet TAKE 1 TABLET BY MOUTH TWICE A DAY 60 tablet 5    Sodium Fluoride 5000 PPM 1.1 % PSTE in the morning and in the evening.      valACYclovir (VALTREX) 1,000 mg tablet Take 1 tablet (1,000 mg total) by mouth 2 (two) times a day for 10 days 20 tablet 0     No current facility-administered medications on file prior to visit.   [2]   Social History  Tobacco Use    Smoking status: Never     Passive exposure: Past    Smokeless tobacco: Never   Vaping Use    Vaping status: Never Used   Substance and Sexual Activity    Alcohol use: Yes     Comment: socially    Drug use: No    Sexual activity: Yes     Partners: Male     Birth control/protection: Condom Male

## 2025-05-27 ENCOUNTER — OFFICE VISIT (OUTPATIENT)
Dept: HEMATOLOGY ONCOLOGY | Facility: CLINIC | Age: 25
End: 2025-05-27
Payer: COMMERCIAL

## 2025-05-27 VITALS
RESPIRATION RATE: 17 BRPM | TEMPERATURE: 98.2 F | SYSTOLIC BLOOD PRESSURE: 108 MMHG | HEIGHT: 61 IN | WEIGHT: 117 LBS | BODY MASS INDEX: 22.09 KG/M2 | DIASTOLIC BLOOD PRESSURE: 80 MMHG | HEART RATE: 81 BPM | OXYGEN SATURATION: 94 %

## 2025-05-27 DIAGNOSIS — E55.9 VITAMIN D DEFICIENCY: ICD-10-CM

## 2025-05-27 DIAGNOSIS — D50.8 IRON DEFICIENCY ANEMIA SECONDARY TO INADEQUATE DIETARY IRON INTAKE: Primary | ICD-10-CM

## 2025-05-27 DIAGNOSIS — E53.8 VITAMIN B12 DEFICIENCY: ICD-10-CM

## 2025-05-27 DIAGNOSIS — E53.8 FOLATE DEFICIENCY: ICD-10-CM

## 2025-05-27 PROCEDURE — 99213 OFFICE O/P EST LOW 20 MIN: CPT

## 2025-05-27 NOTE — PROGRESS NOTES
Name: Russ Fajardo      : 2000      MRN: [id number]  Encounter Provider: VIVIAN Crockett  Encounter Date: 2025   Encounter department: [facility] [place]  :  Assessment & Plan  Iron deficiency anemia secondary to inadequate dietary iron intake    Orders:    CBC; Future    Iron Panel (Includes Ferritin, Iron Sat%, Iron, and TIBC); Future    Vitamin D deficiency    Orders:    Vitamin D 25 hydroxy; Future    Vitamin B12 deficiency    Orders:    Vitamin B12; Future    Folate deficiency    Orders:    Folate; Future    25-year-old female with iron deficiency.  States patient had a significant reaction to Venofer, we will have a future plan in place should she need additional iron treatments.  We discussed Ferrlecit to be run over 2 hours with premedications of Benadryl 25 mg IVPB, Pepcid 20 mg IVPB, Solu-Medrol 20 mg IV push, Tylenol 650 mg.  Patient is willing to try if needed.    Since patient is not able to tolerate oral iron supplements due to constipation and nausea, recommend she focus on an iron rich diet.    Recommend she take vitamin B12 500 mcg and folic acid 400 mcg daily if she is not taking a multivitamin daily.  Will see patient back in the office in 4 months with labs prior.  Patient is agreeable with the plan above.  She is aware to contact us for any additional questions/concerns or worsening symptoms.    Return in about 4 months (around 2025).    History of Present Illness   Chief Complaint   Patient presents with    Follow-up     Pertinent Medical History     25: Joycelyn Fajardo is a 24-year-old female was seen for her initial consultation for iron deficiency on 1/3/2025.  Patient presents for follow-up today.  Patient has PMH significant for hypothyroidism, anemia, IBS with constipation where she has a bowel movement once a week.  She reports she has been anemic since she was 15 years of age.     Patient continues to have a monthly  "menstrual cycle, which last about 7 days, 4 days on the heavier side having to change her pad/tampon every hour.  She was previously started on birth control medication for menorrhagia however, she reports her cycle was lasting for a month.     She is status post IV iron treatment, Venofer 300 mg plus IM B12 injections.  She received 3 doses and after the third dose, patient had a reaction of burning and itching in the legs, her legs turned \"purple,\" her right hand was swollen, she had hives on her face then she was short of breath.  She went to the ER and was given Solu-Medrol, Benadryl and Pepcid.  Within an hour, patient reports her symptoms had resolved.    Patient continues to endorse fatigue, reports no improvement.  Reports her dizziness and lightheadedness have resolved and the frequency of her headaches have lessened.  Denies any CP, SOB, palpitations.  Patient denies abnormal bleeding: epistaxis, gingival bleeding, hematuria, dark tarry stools.       Patient had GI studies completed 6/2023 where she was found to have H. pylori infection and was treated with antibiotics:  Colonoscopy:  Normal     EGD:  Normal esophagus  Mild gastritis. Biopsied for h pylori gastritis  Normal duodenum. Biopsied for celiac disease.  Stomach biopsy positive for chronic active gastritis, positive for curvilinear Helicobacter microorganisms     Labs:  5/22/2025: Hgb 13.8, MCV 94, WBC 4.02, ANC 2.39, folate 8.7, vitamin B12 409, serum iron 89, iron saturation 30%, ferritin 138    1/11/2025: Hgb 11.7, MCV 89, WBC 3.19, platelets 230,000, CRP/sed rate within the normal limits, serum iron 56, iron saturation 14%, ferritin 24, vitamin B12 245    10/5/2024: Hgb 12.9, MCV 91, WBC 4.04, ANC 2.44, platelets 252,000, serum iron 42, iron saturation 11%, ferritin 10     Review of Systems   Constitutional:  Positive for fatigue. Negative for activity change, appetite change, diaphoresis, fever and unexpected weight change.   HENT:  Negative " "for nosebleeds.    Eyes: Negative.    Respiratory:  Negative for cough, chest tightness and shortness of breath.    Cardiovascular:  Negative for chest pain, palpitations and leg swelling.   Gastrointestinal:  Negative for abdominal pain and blood in stool.   Endocrine: Negative.    Genitourinary:  Negative for hematuria.   Musculoskeletal: Negative.    Skin: Negative.    Neurological:  Positive for headaches. Negative for dizziness and light-headedness.   Hematological: Negative.            Objective   /80 (BP Location: Left arm, Patient Position: Sitting, Cuff Size: Adult)   Pulse 81   Temp 98.2 °F (36.8 °C) (Temporal)   Resp 17   Ht 5' 1\" (1.549 m)   Wt 53.1 kg (117 lb)   LMP 05/14/2025   SpO2 94% Comment: acrylic nails  BMI 22.11 kg/m²     Physical Exam  Constitutional:       Appearance: Normal appearance.   HENT:      Head: Normocephalic and atraumatic.     Cardiovascular:      Rate and Rhythm: Regular rhythm.      Heart sounds: Normal heart sounds.   Pulmonary:      Breath sounds: Normal breath sounds.     Musculoskeletal:      Cervical back: Normal range of motion.      Right lower leg: No edema.      Left lower leg: No edema.     Skin:     General: Skin is warm and dry.     Neurological:      Mental Status: She is alert and oriented to person, place, and time.     Psychiatric:         Mood and Affect: Mood normal.         Behavior: Behavior normal.         Thought Content: Thought content normal.         Judgment: Judgment normal.         Labs: I have reviewed the following labs:  Results for orders placed or performed in visit on 05/22/25   POCT wet mount   Result Value Ref Range    Yeast, Wet Prep Negative     pH 4     Whiff Test Negative     Clue Cells Negative     Trich, Wet Prep Negative    I spent 20 minutes in chart review, counseling, coordination of care, and documentation.    This note has been generated by voice recognition software system.  Therefore, there may be spelling, grammar, " and or syntax errors. Please contact if questions arise.

## 2025-06-02 ENCOUNTER — PATIENT MESSAGE (OUTPATIENT)
Dept: OBGYN CLINIC | Facility: CLINIC | Age: 25
End: 2025-06-02

## 2025-06-02 DIAGNOSIS — A60.04 HERPES SIMPLEX VULVOVAGINITIS: ICD-10-CM

## 2025-06-02 RX ORDER — VALACYCLOVIR HYDROCHLORIDE 1 G/1
1000 TABLET, FILM COATED ORAL DAILY
Qty: 5 TABLET | Refills: 5 | Status: SHIPPED | OUTPATIENT
Start: 2025-06-02 | End: 2025-06-07

## 2025-06-03 NOTE — LETTER
[facility]  [address]  [address]  [place] [id number]  Dept: [phone]    January 31, 2025     Patient: Russ Fjaardo   YOB: 2000   Date of Visit: 1/31/2025       To Whom it May Concern:    Russ Fajardo is under my professional care. She was seen in the hospital from 1/31/2025 to 01/31/25. She may return to work on 02/03/25 without limitations.    If you have any questions or concerns, please don't hesitate to call.         Sincerely,          Flor Orellana RN             LOV 05/28/25   Please advise;vaginal bleeding

## 2025-06-05 ENCOUNTER — OFFICE VISIT (OUTPATIENT)
Age: 25
End: 2025-06-05
Payer: COMMERCIAL

## 2025-06-05 VITALS
BODY MASS INDEX: 21.52 KG/M2 | HEIGHT: 61 IN | HEART RATE: 71 BPM | WEIGHT: 114 LBS | DIASTOLIC BLOOD PRESSURE: 70 MMHG | SYSTOLIC BLOOD PRESSURE: 108 MMHG | OXYGEN SATURATION: 98 %

## 2025-06-05 DIAGNOSIS — Z79.899 ENCOUNTER FOR LONG-TERM (CURRENT) USE OF MEDICATIONS: ICD-10-CM

## 2025-06-05 DIAGNOSIS — M79.18 MYOFASCIAL PAIN SYNDROME: Primary | ICD-10-CM

## 2025-06-05 PROCEDURE — 99214 OFFICE O/P EST MOD 30 MIN: CPT | Performed by: PHYSICIAN ASSISTANT

## 2025-06-05 RX ORDER — METHYLPREDNISOLONE 4 MG/1
TABLET ORAL
Qty: 1 EACH | Refills: 0 | Status: SHIPPED | OUTPATIENT
Start: 2025-06-05

## 2025-06-05 NOTE — ASSESSMENT & PLAN NOTE
Overall her myofascial pain has improved with Lyrica however she is having a flare.  I will give her prescription for Medrol Dosepak to help with her flare symptoms.  If her symptoms do not improve after this we could consider increasing her dose of Lyrica.  Encouraged regular home exercise program as well.  Labs ordered to monitor for medication side effects and toxicity.  Follow-up in 6 months or sooner if needed.    Orders:    CBC and differential; Future    Comprehensive metabolic panel; Future    Sedimentation rate, automated; Future    C-reactive protein; Future    methylPREDNISolone 4 MG tablet therapy pack; Use as directed on package

## 2025-06-05 NOTE — PROGRESS NOTES
Name: Russ Fajardo      : 2000      MRN: [id number]  Encounter Provider: Georgia Aguilera PA-C  Encounter Date: 2025   Encounter department: [facility] RHEUMATOLOGY [facility]  :  Assessment & Plan  Myofascial pain syndrome  Overall her myofascial pain has improved with Lyrica however she is having a flare.  I will give her prescription for Medrol Dosepak to help with her flare symptoms.  If her symptoms do not improve after this we could consider increasing her dose of Lyrica.  Encouraged regular home exercise program as well.  Labs ordered to monitor for medication side effects and toxicity.  Follow-up in 6 months or sooner if needed.    Orders:    CBC and differential; Future    Comprehensive metabolic panel; Future    Sedimentation rate, automated; Future    C-reactive protein; Future    methylPREDNISolone 4 MG tablet therapy pack; Use as directed on package    Encounter for long-term (current) use of medications    Orders:    CBC and differential; Future    Comprehensive metabolic panel; Future    Sedimentation rate, automated; Future    C-reactive protein; Future        History of Present Illness   Russ Fajardo is a 25 y.o. female.  She is here for follow-up of myofascial pain with history of a positive rheumatoid factor.  She has a history of widespread joint and myofascial pain.  She does note feeling tender or sore to touch and is also very sensitive to weather changes.  She has tried physical therapy in the past however this did not provide any significant relief for her.  She is currently taking cyclobenzaprine 5 mg as needed along with meloxicam.  She also added a vitamin D supplement as well as a magnesium malate supplement.  She has tried gabapentin in the past however she did not find that this was helpful for her symptoms and she noticed increased brain fog.  She is currently taking Lyrica 50 mg twice a day.  She does note that with the increased dose of  "Lyrica her symptoms overall have been better however she had a flareup recently which was triggered by damp, cool weather.  She has continued to have persistent symptoms and an increase in generalized pain.    She has a history of hyperthyroidism and follows with endocrinology.  She is currently taking methimazole.  She has a history of iron deficiency anemia.  She has been following with hematology.  She did receive iron infusions and her ferritin has improved however unfortunately she had an infusion reaction to her most recent iron infusion.  She is continuing to follow closely with hematology.              Review of Systems   Constitutional:  Positive for fatigue (Stable). Negative for chills and fever.   HENT:  Negative for hearing loss, sore throat and tinnitus.    Eyes:  Negative for pain and visual disturbance.   Respiratory:  Negative for cough and shortness of breath.    Cardiovascular:  Negative for chest pain and palpitations.   Gastrointestinal:  Negative for abdominal pain, nausea and vomiting.   Genitourinary:  Negative for difficulty urinating.   Musculoskeletal:  Positive for arthralgias, back pain, myalgias and neck pain. Negative for gait problem, joint swelling and neck stiffness.   Skin:  Negative for rash.   Neurological:  Negative for dizziness, seizures, weakness, numbness and headaches.   Psychiatric/Behavioral:  Negative for confusion, decreased concentration and sleep disturbance.      Medications Ordered Prior to Encounter[1]      Objective   /70   Pulse 71   Ht 5' 1\" (1.549 m)   Wt 51.7 kg (114 lb)   LMP 05/14/2025   SpO2 98%   BMI 21.54 kg/m²      Physical Exam  Constitutional:       Appearance: Normal appearance.     Cardiovascular:      Rate and Rhythm: Normal rate and regular rhythm.   Pulmonary:      Breath sounds: Normal breath sounds.     Musculoskeletal:         General: No swelling or tenderness.     Skin:     General: Skin is warm and dry.     Neurological:      " General: No focal deficit present.      Mental Status: She is alert.           Dragon Dictation software was used to dictate this note. It may contain errors with dictating incorrect words/spelling. Please contact provider directly for any questions.         [1]   Current Outpatient Medications on File Prior to Visit   Medication Sig Dispense Refill    [facility] B-12 500 MCG tablet TAKE 1 TABLET BY MOUTH DAILY 90 tablet 0    cyclobenzaprine (FLEXERIL) 5 mg tablet TAKE 1 TABLET (5 MG TOTAL) BY MOUTH 2 (TWO) TIMES A DAY AS NEEDED FOR MUSCLE SPASMS. 60 tablet 8    linaCLOtide 290 MCG CAPS Take 1 capsule by mouth daily before breakfast 30-60 minutes before breakfast 30 capsule 5    methimazole (TAPAZOLE) 5 mg tablet TAKE 2 TABLETS BY MOUTH EVERY  tablet 1    norethindrone-ethinyl estradiol-iron (Loestrin Fe 1.5/30) 1.5-30 MG-MCG tablet Take 1 tablet by mouth daily 90 tablet 3    omeprazole (PriLOSEC) 40 MG capsule TAKE 1 CAPSULE (40 MG TOTAL) BY MOUTH DAILY 30 MINUTES BEFORE BREAKFAST 30 capsule 5    ondansetron (ZOFRAN) 4 mg tablet Take 1 tablet (4 mg total) by mouth every 8 (eight) hours as needed for nausea or vomiting 30 tablet 2    propranolol (INDERAL) 40 mg tablet TAKE 1 TABLET BY MOUTH TWICE A DAY 60 tablet 5    Sodium Fluoride 5000 PPM 1.1 % PSTE in the morning and in the evening.      triamcinolone (KENALOG) 0.1 % ointment Apply topically 2 (two) times a day For up to 2 weeks 30 g 0    valACYclovir (VALTREX) 1,000 mg tablet Take 1 tablet (1,000 mg total) by mouth daily for 5 days 5 tablet 5    pregabalin (LYRICA) 50 mg capsule Take 1 capsule (50 mg total) by mouth 2 (two) times a day 60 capsule 5     No current facility-administered medications on file prior to visit.

## 2025-06-19 ENCOUNTER — OFFICE VISIT (OUTPATIENT)
Dept: OBGYN CLINIC | Facility: CLINIC | Age: 25
End: 2025-06-19
Payer: COMMERCIAL

## 2025-06-19 VITALS
OXYGEN SATURATION: 96 % | SYSTOLIC BLOOD PRESSURE: 100 MMHG | DIASTOLIC BLOOD PRESSURE: 62 MMHG | HEART RATE: 79 BPM | WEIGHT: 118 LBS | BODY MASS INDEX: 22.28 KG/M2 | HEIGHT: 61 IN | TEMPERATURE: 97.9 F

## 2025-06-19 DIAGNOSIS — B37.31 VULVOVAGINITIS DUE TO YEAST: Primary | ICD-10-CM

## 2025-06-19 DIAGNOSIS — A60.04 HERPES SIMPLEX VULVOVAGINITIS: ICD-10-CM

## 2025-06-19 PROCEDURE — 99213 OFFICE O/P EST LOW 20 MIN: CPT | Performed by: PHYSICIAN ASSISTANT

## 2025-06-19 RX ORDER — VALACYCLOVIR HYDROCHLORIDE 500 MG/1
500 TABLET, FILM COATED ORAL 2 TIMES DAILY
Qty: 10 TABLET | Refills: 0 | Status: SHIPPED | OUTPATIENT
Start: 2025-06-19 | End: 2025-06-24

## 2025-06-19 RX ORDER — FLUCONAZOLE 150 MG/1
TABLET ORAL
Qty: 2 TABLET | Refills: 0 | Status: SHIPPED | OUTPATIENT
Start: 2025-06-19 | End: 2025-06-23

## 2025-06-19 RX ORDER — NYSTATIN AND TRIAMCINOLONE ACETONIDE 100000; 1 [USP'U]/G; MG/G
OINTMENT TOPICAL 2 TIMES DAILY
Qty: 30 G | Refills: 0 | Status: SHIPPED | OUTPATIENT
Start: 2025-06-19

## 2025-06-19 NOTE — PROGRESS NOTES
Name: Russ Fajardo      : 2000      MRN: [id number]  Encounter Provider: Candice Burkett PA-C  Encounter Date: 2025   Encounter department: [facility] [place]  :  Assessment & Plan  Vulvovaginitis due to yeast  Reviewed findings of yeast on wet mount  Diflucan and mycolog sent to pharmacy on file. Use reviewed.  Reviewed vulvar hygiene   Start valtrex for possible herpetic component. Rx sent to pharmacy.   RTO in 2 weeks if sx persist, sooner as needed. Discussed further workup if not fully resolved.     Orders:    fluconazole (DIFLUCAN) 150 mg tablet; Take one tablet now, then one tablet in 3 days.    nystatin-triamcinolone (MYCOLOG-II) ointment; Apply topically 2 (two) times a day For 2 weeks.    Herpes simplex vulvovaginitis    Orders:    valACYclovir (VALTREX) 500 mg tablet; Take 1 tablet (500 mg total) by mouth 2 (two) times a day for 5 days        History of Present Illness   25 y.o. female here for follow up.     Was seen and treated for irritant contact dermatitis of her vulva 2025.  At the time she reported a 10-day rash that began shortly after using a new cream.  She had treated at that time with a 3-day course of Valtrex due to concern for possible herpetic nature.  She applied triamcinolone ointment twice daily for 2 weeks with significant improvement of symptoms.  Reported full resolution of bumpy rash with faint redness in the affected areas.  She had no itching or irritation to follow.  She was then treated with a steroid due to perioral dermatitis and 2 days ago began with an itchy, red rash of her vulva that stings with urine contact.  No discharge or vaginal odor. Started period today.      Review of Systems   Genitourinary:  Positive for dysuria, vaginal bleeding and vaginal pain. Negative for frequency, hematuria, pelvic pain, urgency and vaginal discharge.   Skin:  Positive for rash.      Objective   /62 (Patient  "Position: Sitting, Cuff Size: Adult)   Pulse 79   Temp 97.9 °F (36.6 °C) (Temporal)   Ht 5' 1\" (1.549 m)   Wt 53.5 kg (118 lb)   LMP 06/19/2025 (Exact Date)   SpO2 96%   BMI 22.30 kg/m²      Physical Exam  Vitals and nursing note reviewed.   Constitutional:       General: She is not in acute distress.     Appearance: Normal appearance.   HENT:      Head: Normocephalic and atraumatic.     Eyes:      Conjunctiva/sclera: Conjunctivae normal.     Pulmonary:      Effort: Pulmonary effort is normal.   Abdominal:      General: There is no distension.      Palpations: Abdomen is soft.      Tenderness: There is no abdominal tenderness.   Genitourinary:     General: Normal vulva.      Vagina: Bleeding (scant menstrual blood) present. No vaginal discharge, erythema or tenderness.      Cervix: Normal.      Uterus: Normal.       Adnexa: Right adnexa normal and left adnexa normal.      Comments: Vulvar erythema that is most prominent over the inferior labia and perineum.  Excoriation markings noted.  No papules, pustules, or vesicles.  Overall appears improved from prior.   Lymphadenopathy:      Lower Body: No right inguinal adenopathy. No left inguinal adenopathy.     Skin:     General: Skin is warm and dry.     Neurological:      General: No focal deficit present.      Mental Status: She is alert.      Cranial Nerves: No cranial nerve deficit.     Psychiatric:         Mood and Affect: Mood normal.         Behavior: Behavior normal.         "

## 2025-06-24 DIAGNOSIS — M79.18 MYOFASCIAL PAIN SYNDROME: ICD-10-CM

## 2025-06-24 RX ORDER — PREGABALIN 50 MG/1
50 CAPSULE ORAL 2 TIMES DAILY
Qty: 60 CAPSULE | Refills: 0 | Status: SHIPPED | OUTPATIENT
Start: 2025-06-24 | End: 2025-12-21

## 2025-06-24 NOTE — TELEPHONE ENCOUNTER
1 [id number] ** 05/06/2025 12/05/2024 Pregabalin (Capsule) 60.0 30 50 MG NA [facility] Commercial Insurance 4 / 5 PA   1 [id number] ** 03/30/2025 12/05/2024 Pregabalin (Capsule) 60.0 30 50 MG NA [facility] Commercial Insurance 3 / 5 PA   1 [id number] ** 02/25/2025 12/05/2024 Pregabalin (Capsule) 60.0 30 50 MG NA [facility] Commercial Insurance 2 / 5 PA   1 [id number] ** 01/11/2025 12/05/2024 Pregabalin (Capsule) 60.0 30 50 MG NA [facility] Commercial Insurance 1 / 5 PA

## 2025-06-30 ENCOUNTER — TELEPHONE (OUTPATIENT)
Dept: GASTROENTEROLOGY | Facility: CLINIC | Age: 25
End: 2025-06-30

## 2025-07-02 ENCOUNTER — OFFICE VISIT (OUTPATIENT)
Dept: GASTROENTEROLOGY | Facility: CLINIC | Age: 25
End: 2025-07-02
Payer: COMMERCIAL

## 2025-07-02 VITALS
SYSTOLIC BLOOD PRESSURE: 102 MMHG | DIASTOLIC BLOOD PRESSURE: 64 MMHG | TEMPERATURE: 98.7 F | WEIGHT: 116.4 LBS | BODY MASS INDEX: 21.99 KG/M2

## 2025-07-02 DIAGNOSIS — K59.04 CHRONIC IDIOPATHIC CONSTIPATION: Primary | ICD-10-CM

## 2025-07-02 PROCEDURE — 99214 OFFICE O/P EST MOD 30 MIN: CPT | Performed by: PHYSICIAN ASSISTANT

## 2025-07-02 RX ORDER — LUBIPROSTONE 24 UG/1
24 CAPSULE ORAL 2 TIMES DAILY WITH MEALS
Qty: 60 CAPSULE | Refills: 2 | Status: SHIPPED | OUTPATIENT
Start: 2025-07-02

## 2025-07-02 NOTE — PROGRESS NOTES
Name: Russ Fajardo      : 2000      MRN: [id number]  Encounter Provider: Medina Stevens PA-C  Encounter Date: 2025   Encounter department: [facility] [place]  :  Assessment & Plan  Chronic idiopathic constipation  CT scan in march was essentially normal except for when I reviewed the actual pictures, it did note a significant amount of stool throughout the colon. The pt was indeed restarted on linzess 290 mcg at her last OV.  Today, she says the high dose Linzess still did not work.  She said she tried taking it on a daily basis for 2 weeks and only moves her bowels once a week which is already her norm.  She says that when she does get the urge to move her bowels she will go to the bathroom but nothing will be evacuated.  She says that she will feel a pressure in her rectum for days until it is finally evacuated.  -I did explain to the patient that I do suspect there may be a component of pelvic floor dysmotility involved however due to her age and lack of prior pregnancy/delivery this would be rare.  I did explain to the patient that she may eventually benefit from a manometry if her symptoms persist but we can start off with pelvic floor physical therapy for now  -Referral for pelvic floor physical therapy placed  -Stop Linzess  -Start Amitiza 24 mcg twice daily: If she continues with symptoms despite being on Amitiza she may benefit from trying Ibsrela at that time           History of Present Illness   HPI  Russ Fajardo is a 25 y.o. female who presents for follow-up. she says the high dose Linzess still did not work.  She said she tried taking it on a daily basis for 2 weeks and only moves her bowels once a week which is already her norm.  She says that when she does get the urge to move her bowels she will go to the bathroom but nothing will be evacuated.  She says that she will feel a pressure in her rectum for days until it is finally evacuated.   "She says that she is used to moving her bowels only once a week so she was planning on \" just giving up\" on trying other medications however she says that she does get bloated and nauseous when she is gone several days around with moving her bowels so she would like to try another medication at this time.  She otherwise denies abdominal pain, vomiting, heartburn, bloody or black bowel movements.  History obtained from: patient    Review of Systems   Constitutional:  Negative for chills, fever and unexpected weight change.   HENT:  Negative for trouble swallowing.    Gastrointestinal:  Positive for abdominal distention, constipation and nausea. Negative for abdominal pain, anal bleeding, blood in stool, diarrhea, rectal pain and vomiting.   All other systems reviewed and are negative.    Medical History Reviewed by provider this encounter:     .  Past Medical History   Past Medical History[1]  Past Surgical History[2]  Family History[3]   reports that she has never smoked. She has been exposed to tobacco smoke. She has never used smokeless tobacco. She reports current alcohol use. She reports that she does not use drugs.  Current Outpatient Medications   Medication Instructions    [facility] B-12 500 mcg, Oral, Daily    cyclobenzaprine (FLEXERIL) 5 mg, Oral, 2 times daily PRN    lubiprostone (AMITIZA) 24 mcg, Oral, 2 times daily with meals    methimazole (TAPAZOLE) 5 mg tablet TAKE 2 TABLETS BY MOUTH EVERY DAY    methylPREDNISolone 4 MG tablet therapy pack Use as directed on package    norethindrone-ethinyl estradiol-iron (Loestrin Fe 1.5/30) 1.5-30 MG-MCG tablet 1 tablet, Oral, Daily    nystatin-triamcinolone (MYCOLOG-II) ointment Topical, 2 times daily, For 2 weeks.    omeprazole (PRILOSEC) 40 mg, Oral, Daily, 30 minutes before breakfast    ondansetron (ZOFRAN) 4 mg, Oral, Every 8 hours PRN    pregabalin (LYRICA) 50 mg, Oral, 2 times daily    propranolol (INDERAL) 40 mg, Oral, 2 times daily    Sodium Fluoride 5000 PPM 1.1 " % PSTE 2 times daily    triamcinolone (KENALOG) 0.1 % ointment Topical, 2 times daily, For up to 2 weeks    valACYclovir (VALTREX) 500 mg, Oral, 2 times daily   Allergies[4]   Medications Ordered Prior to Encounter[5]   Social History[6]     Objective   LMP 06/19/2025 (Exact Date)      Physical Exam  Constitutional:       Appearance: Normal appearance.   Abdominal:      General: Bowel sounds are normal. There is no distension.      Palpations: There is no mass.      Tenderness: There is no abdominal tenderness. There is no guarding or rebound.     Neurological:      Mental Status: She is alert.         Administrative Statements   I have spent a total time of 30 minutes in caring for this patient on the day of the visit/encounter including Diagnostic results, Prognosis, Risks and benefits of tx options, Instructions for management, Patient and family education, Importance of tx compliance, Risk factor reductions, Impressions, Counseling / Coordination of care, Documenting in the medical record, Reviewing/placing orders in the medical record (including tests, medications, and/or procedures), Obtaining or reviewing history  , and Communicating with other healthcare professionals .       [1]   Past Medical History:  Diagnosis Date    Anemia     Constipation     Hyperthyroidism     Pilonidal cyst     Wears glasses    [2]   Past Surgical History:  Procedure Laterality Date    COLONOSCOPY      INCISION AND DRAINAGE ABSCESS ANAL      Pilonidal Cyst  4 times-cut & drained in office-reoccurred    AR EXCISION PILONIDAL CYST/SINUS COMPLICATED N/A 05/23/2019    Procedure: EXCISION PILONIDAL CYST;  Surgeon: Paola Chavarria MD;  Location: [facility];  Service: General    AR EXCISION PILONIDAL CYST/SINUS SIMPLE N/A 04/05/2018    Procedure: EXCISION PILONIDAL CYST and debriding;  Surgeon: Paola Chavarria MD;  Location: [facility];  Service: General   [3]   Family History  Problem Relation Name Age of Onset    No Known Problems  Mother      No Known Problems Father      Asthma Sister Justinoilen     Crohn's disease Brother Merlin     Cancer Maternal Grandmother Orchard     Diabetes Maternal Grandmother Marion     Heart disease Maternal Grandmother Orchard     Hypertension Maternal Grandmother Orchard     Breast cancer Maternal Grandmother Marion     Arthritis Maternal Grandmother Marion     Cancer Paternal Grandmother      Cancer Cousin Jackaline     Cancer Other Sister of GM     Cancer Other Neice of GM     Cervical cancer Neg Hx      Colon cancer Neg Hx     [4]   Allergies  Allergen Reactions    Iron Anaphylaxis    Sulfa Antibiotics Hives   [5]   Current Outpatient Medications on File Prior to Visit   Medication Sig Dispense Refill    [facility] B-12 500 MCG tablet TAKE 1 TABLET BY MOUTH DAILY 90 tablet 0    cyclobenzaprine (FLEXERIL) 5 mg tablet TAKE 1 TABLET (5 MG TOTAL) BY MOUTH 2 (TWO) TIMES A DAY AS NEEDED FOR MUSCLE SPASMS. 60 tablet 8    methimazole (TAPAZOLE) 5 mg tablet TAKE 2 TABLETS BY MOUTH EVERY  tablet 1    norethindrone-ethinyl estradiol-iron (Loestrin Fe 1.5/30) 1.5-30 MG-MCG tablet Take 1 tablet by mouth daily 90 tablet 3    nystatin-triamcinolone (MYCOLOG-II) ointment Apply topically 2 (two) times a day For 2 weeks. 30 g 0    omeprazole (PriLOSEC) 40 MG capsule TAKE 1 CAPSULE (40 MG TOTAL) BY MOUTH DAILY 30 MINUTES BEFORE BREAKFAST 30 capsule 5    ondansetron (ZOFRAN) 4 mg tablet Take 1 tablet (4 mg total) by mouth every 8 (eight) hours as needed for nausea or vomiting 30 tablet 2    pregabalin (LYRICA) 50 mg capsule Take 1 capsule (50 mg total) by mouth 2 (two) times a day 60 capsule 0    propranolol (INDERAL) 40 mg tablet TAKE 1 TABLET BY MOUTH TWICE A DAY 60 tablet 5    Sodium Fluoride 5000 PPM 1.1 % PSTE in the morning and in the evening.      valACYclovir (VALTREX) 500 mg tablet Take 1 tablet (500 mg total) by mouth 2 (two) times a day for 5 days 10 tablet 0    methylPREDNISolone 4 MG tablet therapy pack Use as directed on package  (Patient not taking: Reported on 7/2/2025) 1 each 0    triamcinolone (KENALOG) 0.1 % ointment Apply topically 2 (two) times a day For up to 2 weeks (Patient not taking: Reported on 7/2/2025) 30 g 0    [DISCONTINUED] linaCLOtide 290 MCG CAPS Take 1 capsule by mouth daily before breakfast 30-60 minutes before breakfast (Patient not taking: Reported on 7/2/2025) 30 capsule 5     No current facility-administered medications on file prior to visit.   [6]   Social History  Tobacco Use    Smoking status: Never     Passive exposure: Past    Smokeless tobacco: Never   Vaping Use    Vaping status: Never Used   Substance and Sexual Activity    Alcohol use: Yes     Comment: socially    Drug use: No    Sexual activity: Yes     Partners: Male     Birth control/protection: Condom Male

## 2025-07-03 ENCOUNTER — OFFICE VISIT (OUTPATIENT)
Dept: OBGYN CLINIC | Facility: CLINIC | Age: 25
End: 2025-07-03
Payer: COMMERCIAL

## 2025-07-03 VITALS — WEIGHT: 116.6 LBS | SYSTOLIC BLOOD PRESSURE: 100 MMHG | BODY MASS INDEX: 22.03 KG/M2 | DIASTOLIC BLOOD PRESSURE: 76 MMHG

## 2025-07-03 DIAGNOSIS — N76.0 VULVOVAGINITIS: Primary | ICD-10-CM

## 2025-07-03 PROCEDURE — 99212 OFFICE O/P EST SF 10 MIN: CPT | Performed by: PHYSICIAN ASSISTANT

## 2025-07-03 NOTE — PROGRESS NOTES
Name: Russ Fajardo      : 2000      MRN: [id number]  Encounter Provider: Candice Burkett PA-C  Encounter Date: 7/3/2025   Encounter department: [facility] [place]  :  Assessment & Plan  Vulvovaginitis  Resolved.   Hygiene reviewed.  RTO for Annual exam, otherwise with recurrence or PRN.            History of Present Illness   25 y.o. female here for follow up. Initially seen 27 for irritant contact dermatitis. Was starting to see improvement with pelvic rest, however, developed itchy red rash following oral steroid use and was subsequently treated for yeast vulvovaginitis and HSV 25.  Today she reports today feeling significantly better.  No itching, irritation, or rash. Questions if bumps might be below the surface with touch but otherwise asymptomatic.          Review of Systems       Objective   /76 (BP Location: Left arm, Patient Position: Sitting, Cuff Size: Standard)   Wt 52.9 kg (116 lb 9.6 oz)   LMP 2025 (Exact Date)   BMI 22.03 kg/m²      Physical Exam  Vitals and nursing note reviewed.   Constitutional:       General: She is not in acute distress.     Appearance: Normal appearance. She is not ill-appearing.   HENT:      Head: Normocephalic and atraumatic.     Eyes:      Conjunctiva/sclera: Conjunctivae normal.     Pulmonary:      Effort: Pulmonary effort is normal.   Genitourinary:     General: Normal vulva.      Labia:         Right: No rash, tenderness or lesion.         Left: No rash, tenderness or lesion.      Skin:     General: Skin is warm and dry.     Neurological:      General: No focal deficit present.      Mental Status: She is alert.     Psychiatric:         Mood and Affect: Mood normal.         Behavior: Behavior normal.

## 2025-07-07 DIAGNOSIS — E05.90 HYPERTHYROIDISM: ICD-10-CM

## 2025-07-07 DIAGNOSIS — E05.00 GRAVES DISEASE: Primary | ICD-10-CM

## 2025-07-11 DIAGNOSIS — B37.31 VULVOVAGINITIS DUE TO YEAST: ICD-10-CM

## 2025-07-15 RX ORDER — NYSTATIN AND TRIAMCINOLONE ACETONIDE 100000; 1 [USP'U]/G; MG/G
OINTMENT TOPICAL 2 TIMES DAILY
Qty: 30 G | Refills: 0 | OUTPATIENT
Start: 2025-07-15

## 2025-07-21 DIAGNOSIS — A60.04 HERPES SIMPLEX VULVOVAGINITIS: ICD-10-CM

## 2025-07-21 DIAGNOSIS — R11.0 NAUSEA: ICD-10-CM

## 2025-07-22 RX ORDER — ONDANSETRON 4 MG/1
4 TABLET, FILM COATED ORAL EVERY 8 HOURS PRN
Qty: 30 TABLET | Refills: 1 | Status: SHIPPED | OUTPATIENT
Start: 2025-07-22

## 2025-07-23 RX ORDER — VALACYCLOVIR HYDROCHLORIDE 500 MG/1
500 TABLET, FILM COATED ORAL 2 TIMES DAILY
Qty: 10 TABLET | Refills: 0 | Status: SHIPPED | OUTPATIENT
Start: 2025-07-23 | End: 2025-07-28

## 2025-07-26 DIAGNOSIS — M79.18 MYOFASCIAL PAIN SYNDROME: ICD-10-CM

## 2025-07-29 DIAGNOSIS — K59.04 CHRONIC IDIOPATHIC CONSTIPATION: ICD-10-CM

## 2025-07-29 RX ORDER — LUBIPROSTONE 24 UG/1
CAPSULE ORAL
Qty: 180 CAPSULE | Refills: 0 | Status: SHIPPED | OUTPATIENT
Start: 2025-07-29 | End: 2025-10-27

## 2025-07-29 RX ORDER — PREGABALIN 50 MG/1
50 CAPSULE ORAL 2 TIMES DAILY
Qty: 60 CAPSULE | Refills: 0 | Status: SHIPPED | OUTPATIENT
Start: 2025-07-29

## 2025-07-30 ENCOUNTER — PATIENT MESSAGE (OUTPATIENT)
Dept: OBGYN CLINIC | Facility: CLINIC | Age: 25
End: 2025-07-30

## 2025-07-30 DIAGNOSIS — Z11.3 SCREENING FOR STD (SEXUALLY TRANSMITTED DISEASE): Primary | ICD-10-CM

## 2025-08-06 ENCOUNTER — OFFICE VISIT (OUTPATIENT)
Dept: OBGYN CLINIC | Facility: CLINIC | Age: 25
End: 2025-08-06
Payer: COMMERCIAL

## 2025-08-07 ENCOUNTER — APPOINTMENT (OUTPATIENT)
Age: 25
End: 2025-08-07
Payer: COMMERCIAL

## 2025-08-07 ENCOUNTER — OFFICE VISIT (OUTPATIENT)
Age: 25
End: 2025-08-07
Payer: COMMERCIAL

## 2025-08-07 ENCOUNTER — TRANSCRIBE ORDERS (OUTPATIENT)
Age: 25
End: 2025-08-07

## 2025-08-07 VITALS
BODY MASS INDEX: 21.9 KG/M2 | HEART RATE: 80 BPM | SYSTOLIC BLOOD PRESSURE: 110 MMHG | RESPIRATION RATE: 16 BRPM | OXYGEN SATURATION: 98 % | HEIGHT: 61 IN | TEMPERATURE: 97.6 F | DIASTOLIC BLOOD PRESSURE: 62 MMHG | WEIGHT: 116 LBS

## 2025-08-07 DIAGNOSIS — E05.90 HYPERTHYROIDISM: ICD-10-CM

## 2025-08-07 DIAGNOSIS — Z00.8 HEALTH EXAMINATION IN POPULATION SURVEY: Primary | ICD-10-CM

## 2025-08-07 DIAGNOSIS — F41.1 GENERALIZED ANXIETY DISORDER: Primary | ICD-10-CM

## 2025-08-07 DIAGNOSIS — E05.00 GRAVES DISEASE: ICD-10-CM

## 2025-08-07 LAB
ALBUMIN SERPL BCG-MCNC: 4.3 G/DL (ref 3.5–5)
ALP SERPL-CCNC: 41 U/L (ref 34–104)
ALT SERPL W P-5'-P-CCNC: 9 U/L (ref 7–52)
ANION GAP SERPL CALCULATED.3IONS-SCNC: 8 MMOL/L (ref 4–13)
AST SERPL W P-5'-P-CCNC: 12 U/L (ref 13–39)
BASOPHILS # BLD AUTO: 0.03 THOUSANDS/ÂΜL (ref 0–0.1)
BASOPHILS NFR BLD AUTO: 1 % (ref 0–1)
BILIRUB SERPL-MCNC: 0.42 MG/DL (ref 0.2–1)
BUN SERPL-MCNC: 10 MG/DL (ref 5–25)
CALCIUM SERPL-MCNC: 9 MG/DL (ref 8.4–10.2)
CHLORIDE SERPL-SCNC: 104 MMOL/L (ref 96–108)
CO2 SERPL-SCNC: 27 MMOL/L (ref 21–32)
CREAT SERPL-MCNC: 0.71 MG/DL (ref 0.6–1.3)
EOSINOPHIL # BLD AUTO: 0.04 THOUSAND/ÂΜL (ref 0–0.61)
EOSINOPHIL NFR BLD AUTO: 1 % (ref 0–6)
ERYTHROCYTE [DISTWIDTH] IN BLOOD BY AUTOMATED COUNT: 12.1 % (ref 11.6–15.1)
GFR SERPL CREATININE-BSD FRML MDRD: 120 ML/MIN/1.73SQ M
GLUCOSE P FAST SERPL-MCNC: 85 MG/DL (ref 65–99)
HCT VFR BLD AUTO: 38.1 % (ref 34.8–46.1)
HGB BLD-MCNC: 12.4 G/DL (ref 11.5–15.4)
IMM GRANULOCYTES # BLD AUTO: 0.02 THOUSAND/UL (ref 0–0.2)
IMM GRANULOCYTES NFR BLD AUTO: 0 % (ref 0–2)
LYMPHOCYTES # BLD AUTO: 1.13 THOUSANDS/ÂΜL (ref 0.6–4.47)
LYMPHOCYTES NFR BLD AUTO: 22 % (ref 14–44)
MCH RBC QN AUTO: 30.6 PG (ref 26.8–34.3)
MCHC RBC AUTO-ENTMCNC: 32.5 G/DL (ref 31.4–37.4)
MCV RBC AUTO: 94 FL (ref 82–98)
MONOCYTES # BLD AUTO: 0.4 THOUSAND/ÂΜL (ref 0.17–1.22)
MONOCYTES NFR BLD AUTO: 8 % (ref 4–12)
NEUTROPHILS # BLD AUTO: 3.45 THOUSANDS/ÂΜL (ref 1.85–7.62)
NEUTS SEG NFR BLD AUTO: 68 % (ref 43–75)
NRBC BLD AUTO-RTO: 0 /100 WBCS
PLATELET # BLD AUTO: 220 THOUSANDS/UL (ref 149–390)
PMV BLD AUTO: 12.9 FL (ref 8.9–12.7)
POTASSIUM SERPL-SCNC: 3.9 MMOL/L (ref 3.5–5.3)
PROT SERPL-MCNC: 7.3 G/DL (ref 6.4–8.4)
RBC # BLD AUTO: 4.05 MILLION/UL (ref 3.81–5.12)
SODIUM SERPL-SCNC: 139 MMOL/L (ref 135–147)
T3FREE SERPL-MCNC: 3.72 PG/ML (ref 2.5–3.9)
T4 FREE SERPL-MCNC: 1.1 NG/DL (ref 0.61–1.12)
TSH SERPL DL<=0.05 MIU/L-ACNC: 0.91 UIU/ML (ref 0.45–4.5)
WBC # BLD AUTO: 5.07 THOUSAND/UL (ref 4.31–10.16)

## 2025-08-07 PROCEDURE — 99214 OFFICE O/P EST MOD 30 MIN: CPT | Performed by: INTERNAL MEDICINE

## 2025-08-07 PROCEDURE — 84443 ASSAY THYROID STIM HORMONE: CPT | Performed by: INTERNAL MEDICINE

## 2025-08-07 RX ORDER — DULOXETIN HYDROCHLORIDE 30 MG/1
30 CAPSULE, DELAYED RELEASE ORAL DAILY
Qty: 30 CAPSULE | Refills: 5 | Status: SHIPPED | OUTPATIENT
Start: 2025-08-07

## 2025-08-14 ENCOUNTER — PROCEDURE VISIT (OUTPATIENT)
Dept: OBGYN CLINIC | Facility: CLINIC | Age: 25
End: 2025-08-14
Payer: COMMERCIAL

## (undated) DEVICE — GLOVE SRG BIOGEL 6.5

## (undated) DEVICE — RAZOR STERILE

## (undated) DEVICE — TUBING SUCTION 5MM X 12 FT

## (undated) DEVICE — GLOVE INDICATOR PI UNDERGLOVE SZ 7 BLUE

## (undated) DEVICE — PLUMEPEN PRO 10FT

## (undated) DEVICE — INTENDED FOR TISSUE SEPARATION, AND OTHER PROCEDURES THAT REQUIRE A SHARP SURGICAL BLADE TO PUNCTURE OR CUT.: Brand: BARD-PARKER SAFETY BLADES SIZE 15, STERILE

## (undated) DEVICE — SUT ETHILON 3-0 PS-1 18 IN 1663G

## (undated) DEVICE — SCD SEQUENTIAL COMPRESSION COMFORT SLEEVE MEDIUM KNEE LENGTH: Brand: KENDALL SCD

## (undated) DEVICE — SUT MONOCRYL 4-0 PS-2 27 IN Y426H

## (undated) DEVICE — MEDI-VAC YANKAUER SUCTION HANDLE W/BULBOUS AND CONTROL VENT: Brand: CARDINAL HEALTH

## (undated) DEVICE — TELFA NON-ADHERENT ABSORBENT DRESSING: Brand: TELFA

## (undated) DEVICE — 2963 MEDIPORE SOFT CLOTH TAPE 3 IN X 10 YD 12 RLS/CS: Brand: 3M™ MEDIPORE™

## (undated) DEVICE — GLOVE SRG BIOGEL 7

## (undated) DEVICE — POSITION CUSHION INSERT LARGE PRONEVIEW

## (undated) DEVICE — ADHESIVE SKN CLSR HISTOACRYL FLEX 0.5ML LF

## (undated) DEVICE — STRL UNIVERSAL MINOR GENERAL: Brand: CARDINAL HEALTH

## (undated) DEVICE — NEEDLE HYPO 22G X 1-1/2 IN

## (undated) DEVICE — SUT VICRYL 3-0 SH 27 IN J416H

## (undated) DEVICE — CRADLE EXTREMITY UNIVERSAL CONTOURED

## (undated) DEVICE — DRAPE EQUIPMENT RF WAND

## (undated) DEVICE — SUT VICRYL 2-0 SH 27 IN UNDYED J417H

## (undated) DEVICE — 2000CC GUARDIAN II: Brand: GUARDIAN

## (undated) DEVICE — NEEDLE 25G X 1 1/2

## (undated) DEVICE — GLOVE INDICATOR PI UNDERGLOVE SZ 6.5 BLUE

## (undated) DEVICE — CHLORAPREP HI-LITE 26ML ORANGE

## (undated) DEVICE — GAUZE SPONGES,16 PLY: Brand: CURITY

## (undated) DEVICE — SPONGE STICK WITH PVP-I: Brand: KENDALL

## (undated) DEVICE — BETHLEHEM UNIVERSAL MINOR GEN: Brand: CARDINAL HEALTH